# Patient Record
Sex: MALE | Race: WHITE | NOT HISPANIC OR LATINO | Employment: OTHER | URBAN - METROPOLITAN AREA
[De-identification: names, ages, dates, MRNs, and addresses within clinical notes are randomized per-mention and may not be internally consistent; named-entity substitution may affect disease eponyms.]

---

## 2017-04-05 ENCOUNTER — ALLSCRIPTS OFFICE VISIT (OUTPATIENT)
Dept: OTHER | Facility: OTHER | Age: 62
End: 2017-04-05

## 2017-04-05 DIAGNOSIS — R41.3 OTHER AMNESIA: ICD-10-CM

## 2017-04-18 ENCOUNTER — HOSPITAL ENCOUNTER (OUTPATIENT)
Dept: MRI IMAGING | Facility: HOSPITAL | Age: 62
Discharge: HOME | End: 2017-04-18
Payer: COMMERCIAL

## 2017-04-18 ENCOUNTER — TRANSCRIBE ORDERS (OUTPATIENT)
Dept: ADMINISTRATIVE | Facility: HOSPITAL | Age: 62
End: 2017-04-18

## 2017-04-18 DIAGNOSIS — R41.3 OTHER AMNESIA: ICD-10-CM

## 2017-04-26 ENCOUNTER — TRANSCRIBE ORDERS (OUTPATIENT)
Dept: ADMINISTRATIVE | Facility: HOSPITAL | Age: 62
End: 2017-04-26

## 2017-04-26 DIAGNOSIS — R41.3 MEMORY LOSS: Primary | ICD-10-CM

## 2017-05-09 ENCOUNTER — HOSPITAL ENCOUNTER (OUTPATIENT)
Dept: MRI IMAGING | Facility: HOSPITAL | Age: 62
Discharge: HOME | End: 2017-05-09
Payer: COMMERCIAL

## 2017-05-09 DIAGNOSIS — R41.3 MEMORY LOSS: ICD-10-CM

## 2017-05-09 PROCEDURE — 70551 MRI BRAIN STEM W/O DYE: CPT

## 2017-05-10 ENCOUNTER — LAB CONVERSION - ENCOUNTER (OUTPATIENT)
Dept: OTHER | Facility: OTHER | Age: 62
End: 2017-05-10

## 2017-05-10 LAB
ERYTHROCYTE SEDIMENTATION RATE (HISTORICAL): 2 MM/H
METHYLMALONIC ACID (HISTORICAL): 94 NMOL/L (ref 87–318)
T4 FREE SERPL-MCNC: 1 NG/DL (ref 0.8–1.8)
TSH SERPL DL<=0.05 MIU/L-ACNC: 2.01 MIU/L (ref 0.4–4.5)
VIT B12 SERPL-MCNC: 809 PG/ML (ref 200–1100)

## 2017-05-15 ENCOUNTER — ALLSCRIPTS OFFICE VISIT (OUTPATIENT)
Dept: OTHER | Facility: OTHER | Age: 62
End: 2017-05-15

## 2017-06-21 ENCOUNTER — ALLSCRIPTS OFFICE VISIT (OUTPATIENT)
Dept: OTHER | Facility: OTHER | Age: 62
End: 2017-06-21

## 2017-08-01 DIAGNOSIS — E78.5 HYPERLIPIDEMIA: ICD-10-CM

## 2017-08-01 DIAGNOSIS — R53.83 OTHER FATIGUE: ICD-10-CM

## 2017-09-08 LAB
A/G RATIO (HISTORICAL): 1.9 (CALC) (ref 1–2.5)
ALBUMIN SERPL BCP-MCNC: 4.1 G/DL (ref 3.6–5.1)
ALP SERPL-CCNC: 78 U/L (ref 40–115)
ALT SERPL W P-5'-P-CCNC: 21 U/L (ref 9–46)
AST SERPL W P-5'-P-CCNC: 25 U/L (ref 10–35)
BILIRUB SERPL-MCNC: 0.5 MG/DL (ref 0.2–1.2)
BUN SERPL-MCNC: 16 MG/DL (ref 7–25)
BUN/CREA RATIO (HISTORICAL): NORMAL (CALC) (ref 6–22)
CALCIUM SERPL-MCNC: 9.1 MG/DL (ref 8.6–10.3)
CHLORIDE SERPL-SCNC: 104 MMOL/L (ref 98–110)
CHOLEST SERPL-MCNC: 212 MG/DL
CO2 SERPL-SCNC: 29 MMOL/L (ref 20–31)
CREAT SERPL-MCNC: 0.97 MG/DL (ref 0.7–1.25)
EGFR AFRICAN AMERICAN (HISTORICAL): 95 ML/MIN/1.73M2
EGFR-AMERICAN CALC (HISTORICAL): 82 ML/MIN/1.73M2
GAMMA GLOBULIN (HISTORICAL): 2.2 G/DL (CALC) (ref 1.9–3.7)
GLUCOSE (HISTORICAL): 93 MG/DL (ref 65–99)
HDLC SERPL-MCNC: 67 MG/DL
LDL CHOLESTEROL DIRECT (HISTORICAL): 131 MG/DL
POTASSIUM SERPL-SCNC: 4.6 MMOL/L (ref 3.5–5.3)
SODIUM SERPL-SCNC: 140 MMOL/L (ref 135–146)
TOTAL PROTEIN (HISTORICAL): 6.3 G/DL (ref 6.1–8.1)
TRIGL SERPL-MCNC: 61 MG/DL

## 2017-09-12 ENCOUNTER — ALLSCRIPTS OFFICE VISIT (OUTPATIENT)
Dept: OTHER | Facility: OTHER | Age: 62
End: 2017-09-12

## 2017-09-12 DIAGNOSIS — R06.00 DYSPNEA: ICD-10-CM

## 2017-09-21 ENCOUNTER — HOSPITAL ENCOUNTER (OUTPATIENT)
Dept: NON INVASIVE DIAGNOSTICS | Facility: CLINIC | Age: 62
Discharge: HOME | End: 2017-09-21
Payer: COMMERCIAL

## 2017-09-21 DIAGNOSIS — R06.00 DYSPNEA: ICD-10-CM

## 2017-09-21 LAB
CHEST PAIN STATEMENT: NORMAL
MAX DIASTOLIC BP: 84 MMHG
MAX HEART RATE: 162 BPM
MAX PREDICTED HEART RATE: 156 BPM
MAX. SYSTOLIC BP: 148 MMHG
PROTOCOL NAME: NORMAL
REASON FOR TERMINATION: NORMAL
TARGET HR FORMULA: NORMAL
TEST INDICATION: NORMAL
TIME IN EXERCISE PHASE: 780 S

## 2017-09-21 PROCEDURE — 93017 CV STRESS TEST TRACING ONLY: CPT

## 2017-10-26 NOTE — PROGRESS NOTES
Assessment  1  Depression with anxiety (300 4) (F41 8)  2  Fatigue (780 79) (R53 83)  3  Hyperlipidemia (272 4) (E78 5)  4  Memory loss (780 93) (R41 3)  5  Sleep apnea (780 57) (G47 30)     #1 health maintenance-given influenza vaccine today  2- episode of feeling anxious for 30 minutes-no obvious etiology  Exam stable  EKG stable  Because of this and his risk factors for CAD I did set him up for screening stress test  #3 concern about memory  Improved on cutting back alcohol  MRI of the brain normal  All labs including thyroid function, B 12, CBC, SMA methylmalonic acid level normal  Treating his sleep apnea cases is contributing  Questionable component of pseudodementia with depression  #4 anxiety depression-on an SSRI and appears to be responding to some degree-Lexapro current dose was started in May 2017  #5 sleep apnea-mild-improved with less alcohol  Again encouraged him to use his continuous positive airway pressure nightly because of his comorbidities  #6 hyperlipidemia-previous he stopped her statin because of questional leg weakness  Now back on pravastatin but I increased it to daily    All other problems as per note of April 2017    MEDICAL REGIMEN: Baby aspirin daily, increasing pravastatin 20 mg daily, Xanax 0 25 mg twice a day when necessary, Lexapro 10 mg daily, continuous positive airway pressure    Appointment in several months or prior chemistry profile, cholesterol profile  Await results of stress test    Addendum-stress test done in September 2017 normal  ZARA CHART AT NEXT VISIT STRESS TEST DONE IN [facility] 2017        Plan  Influenza vaccine given  Stress test ordered  Increase pravastatin 20 mg daily  Wear continuous positive airway pressure nightly  Call office if noting any chest pain or pressure with activity  Call office if noting any weakness of one arm or leg compared to the other  Call office if noting any numbness of one arm or leg compared to the other    Call office if noting any blurred or double vision     History of Present Illness  HPI: Reviewed several issues  He said he had an issue where he was working outside  For about 30 minutes he felt anxious and sweaty  He did not of associated palpitations or headache  He said when his family saw him he was stable  He said he is a history of anxiety but this was more profound than before  Denied of any definite chest pain or pressure  EKG done today shows a normal tracing specifically sinus rhythm, normal axis, normal intervals, normal tracing  is a history of hyperlipidemia  He remains in a statin  He understands the difference between statin so she myalgias and arthralgias from degenerative arthritis  done recently show normal chemistry profile with a creatinine of 0 97, cholesterol 212, triglycerides 61, HDL 67,   He has not been using his continuous positive airway pressure  He understands importance of this  He has concerns about memory loss and we reviewed literature showing that treating patients with sleep apnea helps improve his memory function  That is not decreases the incidence of dementia  patient denies any systemic symptoms  Specifically there has been no evidence of fever, night sweats, significant weight loss or significant decrease in appetite  is a history of anxiety  As noted he remains in his SSRI  This has not been a major issue  He denies significant anxiety or depressive symptoms other than the above-described episode  is now retired? HE SAYS HE FEELS MUCH LESS ANXIOUS SINCE THIS HAS OCCURRED?        Review of Systems  Complete-Male:   Constitutional: feeling tired, but-- no fever,-- not feeling poorly-- and-- no chills  Eyes: No complaints of eye pain, no red eyes, no discharge from eyes, no itchy eyes  ENT: no earache,-- no nosebleeds,-- no sore throat,-- no hearing loss,-- no nasal discharge-- and-- no hoarseness     Cardiovascular: No complaints of slow heart rate, no fast heart rate, no chest pain, no palpitations, no leg claudication, no lower extremity  Respiratory: cough, but-- no shortness of breath,-- no orthopnea,-- no wheezing,-- no shortness of breath during exertion-- and-- no PND  Gastrointestinal: No complaints of abdominal pain, no constipation, no nausea or vomiting, no diarrhea or bloody stools  Genitourinary: No complaints of dysuria, no incontinence, no hesitancy, no nocturia, no genital lesion, no testicular pain  Musculoskeletal: limb pain, but-- no arthralgias,-- no joint swelling,-- no myalgias-- and-- no limb swelling  Integumentary: No complaints of skin rash or skin lesions, no itching, no skin wound, no dry skin  Neurological: No compliants of headache, no confusion, no convulsions, no numbness or tingling, no dizziness or fainting, no limb weakness, no difficulty walking  Psychiatric: anxiety-- and-- Questional memory issues, but-- not suicidal,-- no personality change,-- no sleep disturbances-- and-- no depression  Endocrine: No complaints of proptosis, no hot flashes, no muscle weakness, no erectile dysfunction, no deepening of the voice, no feelings of weakness  Hematologic/Lymphatic: No complaints of swollen glands, no swollen glands in the neck, does not bleed easily, no easy bruising  Active Problems  1  Allergic Reaction (995 3)  2  Ankle sprain (845 00) (S93 409A)  3  Anxiety (300 00) (F41 9)  4  Backache (724 5) (M54 9)  5  Benign prostatic hypertrophy (600 00) (N40 0)  6  Cough (786 2) (R05)  7  Depression with anxiety (300 4) (F41 8)  8  Dizziness (780 4) (R42)  9  Dyspnea (786 09) (R06 00)  10  Elevated ALT measurement (790 4) (R74 0)  11  Elevated AST (SGOT) (790 4) (R74 0)  12  Encounter for prostate cancer screening (V76 44) (Z12 5)  13  Fatigue (780 79) (R53 83)  14  Hyperlipidemia (272 4) (E78 5)  15  Loss of hearing (389 9) (H91 90)  16  Malignant melanoma of skin (172 9) (C43 9)  17  Memory loss (780 93) (R41 3)  18   Pain in ankle joint (786 30) (M25 579)  19  Pain of upper extremity, unspecified laterality (729 5) (M79 603)  20  Sleep apnea (780 57) (G47 30)  21  Symptoms involving urinary system (788 99) (R39 9)    Past Medical History  1  History of Acute otitis media, unspecified laterality  2  History of Acute upper respiratory infection (465 9) (J06 9)  3  History of BPH with obstruction/lower urinary tract symptoms (600 01,599 69) (N40 1,N13 8)  4  History of Cough (786 2) (R05)  5  History of acute bronchitis (V12 69) (Z87 09)  6  History of acute sinusitis (V12 69) (Z87 09)  7  History of fever (V13 89) (Z87 898)  8  History of pneumonia (V12 61) (Z87 01)  9  History of sinusitis (V12 69) (Z87 09)  10  History of viral infection (V12 09) (Z86 19)  11  History of Laceration (879 8)  12  History of Long term use of drug (V58 69) (Z79 899)  13  History of Need for prophylactic vaccination and inoculation against influenza (V04 81) (Z23)  14  History of Tachycardia (785 0) (R00 0)  15  History of Thrombophlebitis of superficial veins of upper extremities (451 82) (I80 8)  16  History of Uncomplicated alcohol abuse (305 00) (F10 10)  17  History of Weakness of both arms (729 89) (R29 898)  18  History of Weakness of both legs (729 89) (R29 898)  Active Problems And Past Medical History Reviewed: The active problems and past medical history were reviewed and updated today  Surgical History  1  History of Complete Colonoscopy  2  History of Hernia Repair  Surgical History Reviewed: The surgical history was reviewed and updated today  Family History  Mother   1  Family history of Arthritis (V17 7)  2  Family history of Hyperlipidemia  Family History   3  Family history of Arthritis (V17 7)  4  Family history of Hyperlipidemia    Social History   · Being A Social Drinker   · Former smoker (P42 67) (X87 577)   · Marital History - Currently   Social History Reviewed: The social history was reviewed and updated today   The social history was reviewed and is unchanged  Current Meds  1  Aspirin 81 MG TABS; TAKE 1 TABLET DAILY; Therapy: (Recorded:04Oct2016) to Recorded  2  [employer] Daily Multiple Oral Tablet; Therapy: (Recorded:04Mar2014) to Recorded  3  Escitalopram Oxalate 10 MG Oral Tablet; TAKE 1/2 TABLET BY MOUTH DAILY FOR 1 WEEK, THEN   INCREASE TO 1 TAB DAILY; Therapy: 52FHP0444 to (Evaluate:21Jun2018)  Requested for: 04PBY5573; Last Rx:57Cpm3675   Ordered  4  Flax Seed Oil CAPS; Therapy: (Recorded:04Mar2014) to Recorded  5  Pravastatin Sodium 20 MG Oral Tablet; TAKE 1 TABLET AT BEDTIME; Therapy: 93FRG4513 to [phone])  Requested for: 50JQV9934; Last Rx:77Azs2642   Ordered  Medication List Reviewed: The medication list was reviewed and updated today  Allergies  1  Tamiflu CAPS  2  Fish Oil CAPS    Vitals  Vital Signs    Recorded: 93Fhj4769 04:09PM Recorded: 12Sep2017 03:34PM   Heart Rate 68    Respiration 14    Systolic 680, RUE, Sitting    Diastolic 70, RUE, Sitting    Height  5 ft 8 in   Weight  160 lb    BMI Calculated  24 33   BSA Calculated  1 86     Physical Exam    Constitutional   General appearance: No acute distress, well appearing and well nourished  Head and Face   Head and face: Normal     Palpation of the face and sinuses: No sinus tenderness  Eyes   Conjunctiva and lids: No erythema, swelling or discharge  Pupils and irises: Equal, round, reactive to light  Ears, Nose, Mouth, and Throat   External inspection of ears and nose: Normal     Otoscopic examination: Tympanic membranes translucent with normal light reflex  Canals patent without erythema  Hearing: Normal     Nasal mucosa, septum, and turbinates: Normal without edema or erythema  Lips, teeth, and gums: Normal, good dentition  Oropharynx: Normal with no erythema, edema, exudate or lesions  Neck   Neck: Supple, symmetric, trachea midline, no masses  Thyroid: Normal, no thyromegaly      Pulmonary   Respiratory effort: No increased work of breathing or signs of respiratory distress  Percussion of chest: Normal     Palpation of chest: Normal     Auscultation of lungs: Clear to auscultation  Cardiovascular   Palpation of heart: Normal PMI, no thrills  Auscultation of heart: Normal rate and rhythm, normal S1 and S2, no murmurs  Carotid pulses: 2+ bilaterally  Abdominal aorta: Normal     Femoral pulses: 2+ bilaterally  Pedal pulses: 2+ bilaterally  Peripheral vascular exam: Normal     Examination of extremities for edema and/or varicosities: Normal     Chest   Breasts: Normal, no dimpling or skin changes appreciated  Palpation of breasts and axillae: Normal, no masses palpated  Chest: Normal     Abdomen   Abdomen: Non-tender, no masses  Liver and spleen: No hepatomegaly or splenomegaly  Examination for hernias: No hernias appreciated  Anus, perineum, and rectum: Normal sphincter tone, no masses, no prolapse  Genitourinary   Scrotal contents: Normal testes, no masses  Penis: Normal, no lesions  Lymphatic   Palpation of lymph nodes in neck: No lymphadenopathy  Palpation of lymph nodes in axillae: No lymphadenopathy  Palpation of lymph nodes in groin: No lymphadenopathy  Palpation of lymph nodes in other areas: No lymphadenopathy  Musculoskeletal   Gait and station: Normal     Inspection/palpation of digits and nails: Normal without clubbing or cyanosis  Inspection/palpation of joints, bones, and muscles: Normal     Range of motion: Normal     Stability: Normal     Muscle strength/tone: Normal     Skin   Skin and subcutaneous tissue: Normal without rashes or lesions  Palpation of skin and subcutaneous tissue: Normal turgor  Neurologic   Cranial nerves: Cranial nerves 2-12 intact  Reflexes: 2+ and symmetric  Sensation: No sensory loss  Psychiatric   Judgment and insight: Normal     Orientation to person, place and time: Normal     Recent and remote memory: Intact  Mood and affect: Normal        Signatures   Electronically signed by : OCTAVIA Johnson ; Sep 12 2017  4:15PM EST                       (Author)    Electronically signed by : OCTAVIA Johnson ; Sep 23 2017  9:30AM EST                       (Author)

## 2017-11-09 ENCOUNTER — APPOINTMENT (OUTPATIENT)
Dept: PHYSICAL THERAPY | Age: 62
End: 2017-11-09
Payer: COMMERCIAL

## 2017-11-09 PROCEDURE — G8990 OTHER PT/OT CURRENT STATUS: HCPCS

## 2017-11-09 PROCEDURE — G8991 OTHER PT/OT GOAL STATUS: HCPCS

## 2017-11-09 PROCEDURE — 97162 PT EVAL MOD COMPLEX 30 MIN: CPT

## 2017-11-14 ENCOUNTER — APPOINTMENT (OUTPATIENT)
Dept: PHYSICAL THERAPY | Age: 62
End: 2017-11-14
Payer: COMMERCIAL

## 2017-11-14 PROCEDURE — 97110 THERAPEUTIC EXERCISES: CPT

## 2017-11-14 PROCEDURE — 97140 MANUAL THERAPY 1/> REGIONS: CPT

## 2017-11-16 ENCOUNTER — APPOINTMENT (OUTPATIENT)
Dept: PHYSICAL THERAPY | Age: 62
End: 2017-11-16
Payer: COMMERCIAL

## 2017-11-16 PROCEDURE — 97140 MANUAL THERAPY 1/> REGIONS: CPT

## 2017-11-16 PROCEDURE — 97112 NEUROMUSCULAR REEDUCATION: CPT

## 2017-11-20 ENCOUNTER — APPOINTMENT (OUTPATIENT)
Dept: PHYSICAL THERAPY | Age: 62
End: 2017-11-20
Payer: COMMERCIAL

## 2017-11-20 PROCEDURE — 97112 NEUROMUSCULAR REEDUCATION: CPT

## 2017-11-20 PROCEDURE — 97140 MANUAL THERAPY 1/> REGIONS: CPT

## 2017-11-21 ENCOUNTER — ALLSCRIPTS OFFICE VISIT (OUTPATIENT)
Dept: OTHER | Facility: OTHER | Age: 62
End: 2017-11-21

## 2017-11-22 ENCOUNTER — APPOINTMENT (OUTPATIENT)
Dept: PHYSICAL THERAPY | Age: 62
End: 2017-11-22
Payer: COMMERCIAL

## 2017-11-22 PROCEDURE — 97140 MANUAL THERAPY 1/> REGIONS: CPT

## 2017-11-22 PROCEDURE — 97112 NEUROMUSCULAR REEDUCATION: CPT

## 2017-11-22 NOTE — PROGRESS NOTES
Assessment  Assessed    1  Hyperlipidemia (272 4) (E78 5)    Plan  Hyperlipidemia    · Pravastatin Sodium 20 MG Oral Tablet; TAKE 1 TABLET AT BEDTIME   Rx By: Star Fillers; Dispense: 90 Days ; #:90 Tablet; Refill: 3;Hyperlipidemia; [place] = N; Verified Transmission to [id number] [place] [address]  S [address] ; Last Updated By: System, trend.ly; 11/21/2017 2:43:11 PM   · Follow-up visit in 6 months Evaluation and Treatment  Follow-up  Status: Hold For -Scheduling  Requested for: 63RDX0856   Ordered; Hyperlipidemia; Ordered By: Star Fillers Performed:  Due: 30ITX7930    Discussion/Summary  Cardiology Discussion Summary Free Text Note Form [facility]:   57year old man with history of dyslipidemia who presents for follow up  Major complaint is tiredness  No change with stopping pravastatin  No chest pain, shortness of breath, or palpitations  Has sleep apnea - is trying to use mask  Somnolence - likely due to Sleep Apnea  Is trying CPAP  Dyslipidemia - on Pravastatin  Borderline control  Continue current medications  Follow up in 6 months  Chief Complaint  Chief Complaint Free Text Note Form: Patient is here today for 6 mo f/u  Patient c/o occ lightheadedness  Chief Complaint Chronic Condition [facility]: Patient is here today for follow up of chronic conditions described in HPI  History of Present Illness  Cardiology HPI Free Text Note Form [facility]: Mr Rachna Geiger is a 57year old man with history of dyslipidemia who presents for follow up  Major complaint is tiredness  No change with stopping pravastatin  No chest pain, shortness of breath, or palpitations  Has sleep apnea - is trying to use mask  Review of Systems  Cardiology Male ROS:    Cardiac: No complaints of chest pain, no palpitations, no fainiting  Skin: No complaints of nonhealing sores or skin rash    Genitourinary: No complaints of recurrent urinary tract infections, frequent urination at night, difficult urination, blood in urine, kidney stones, loss of bladder control, no kidney or prostate problems, no erectile dysfunction  Psychological: No complaints of feeling depressed, anxiety, panic attacks, or difficulty concentrating  General: lack of energy/fatigue--   Weakness  Respiratory: No complaints of shortness of breath, cough with sputum, or wheezing  HEENT: No complaints of serious problems, hearing problems, nose problems, throat problems, or snoring  Gastrointestinal: No complaints of liver problems, nausea, vomiting, heartburn, constipation, bloody stools, diarrhea, problems swallowing, adbominal pain, or rectal bleeding  Hematologic: No complaints of bleeding disorders, anemia, blood clots, or excessive brusing  Neurological: No complaints of numbness, tingling, dizziness, weakness, seizures, headaches, syncope or fainting, AM fatigue, daytime sleepiness, no witnessed apnea episodes  Musculoskeletal: No complaints of arthritis, back pain, or painfull swelling  Active Problems  Problems    1  Allergic Reaction (995 3)   2  Ankle sprain (845 00) (S93 409A)   3  Anxiety (300 00) (F41 9)   4  Backache (724 5) (M54 9)   5  Benign prostatic hypertrophy (600 00) (N40 0)   6  Cough (786 2) (R05)   7  Depression with anxiety (300 4) (F41 8)   8  Dizziness (780 4) (R42)   9  Dyspnea (786 09) (R06 00)   10  Elevated ALT measurement (790 4) (R74 0)   11  Elevated AST (SGOT) (790 4) (R74 0)   12  Encounter for prostate cancer screening (V76 44) (Z12 5)   13  Fatigue (780 79) (R53 83)   14  Hyperlipidemia (272 4) (E78 5)   15  Loss of hearing (389 9) (H91 90)   16  Malignant melanoma of skin (172 9) (C43 9)   17  Memory loss (780 93) (R41 3)   18  Need for influenza vaccination (V04 81) (Z23)   19  Pain in ankle joint (719 47) (M25 579)   20  Pain of upper extremity, unspecified laterality (729 5) (M79 603)   21  Palpitation (785 1) (R00 2)   22  Sleep apnea (780 57) (G47 30)   23  Symptoms involving urinary system (788 99) (R39 9)   24   Vitamin D deficiency (268 9) (E55 9)    Past Medical History  Problems    1  History of Acute otitis media, unspecified laterality   2  History of Acute upper respiratory infection (465 9) (J06 9)   3  History of BPH with obstruction/lower urinary tract symptoms (600 01,599 69) (N40 1,N13 8)   4  History of Cough (786 2) (R05)   5  History of acute bronchitis (V12 69) (Z87 09)   6  History of acute sinusitis (V12 69) (Z87 09)   7  History of fever (V13 89) (Z87 898)   8  History of pneumonia (V12 61) (Z87 01)   9  History of sinusitis (V12 69) (Z87 09)   10  History of viral infection (V12 09) (Z86 19)   11  History of Laceration (879 8)   12  History of Long term use of drug (V58 69) (Z79 899)   13  History of Need for prophylactic vaccination and inoculation against influenza (V04 81)  (Z23)   14  History of Tachycardia (785 0) (R00 0)   15  History of Thrombophlebitis of superficial veins of upper extremities (451 82) (I80 8)   16  History of Uncomplicated alcohol abuse (305 00) (F10 10)   17  History of Weakness of both arms (729 89) (R29 898)   18  History of Weakness of both legs (729 89) (R29 898)  Active Problems And Past Medical History Reviewed: The active problems and past medical history were reviewed and updated today  Surgical History  Problems    1  History of Complete Colonoscopy   2  History of Hernia Repair  Surgical History Reviewed: The surgical history was reviewed and updated today  Family History  Mother    1  Family history of Arthritis (V17 7)   2  Family history of Hyperlipidemia  Family History    3  Family history of Arthritis (V17 7)   4  Family history of Hyperlipidemia  Family History Reviewed: The family history was reviewed and updated today  Social History  Problems    · Being A Social Drinker   · Former smoker (K54 30) (W00 362)   · Marital History - Currently   Social History Reviewed: The social history was reviewed and updated today   The social history was reviewed and is unchanged  Current Meds   1  Aspirin 81 MG TABS; TAKE 1 TABLET DAILY; Therapy: (Recorded:04Oct2016) to Recorded   2  [employer] Daily Multiple Oral Tablet; Therapy: (Recorded:04Mar2014) to Recorded   3  Escitalopram Oxalate 10 MG Oral Tablet; TAKE 1/2 TABLET BY MOUTH DAILY FOR 1 WEEK, THEN INCREASE TO 1 TAB DAILY; Therapy: 35JWO4102 to (Evaluate:21Jun2018)  Requested for: 06NDU0679; Last Rx:34Ayf1531 Ordered   4  Flax Seed Oil CAPS; Therapy: (Recorded:04Mar2014) to Recorded   5  Fluticasone Propionate 50 MCG/ACT Nasal Suspension; use 2 sprays in each nostril once daily; Therapy: 57UDL5101 to (Evaluate:53Rtp1375)  Requested for: 09NNS2079; Last Rx:53Guy4267 Ordered   6  Pravastatin Sodium 20 MG Oral Tablet; TAKE 1 TABLET AT BEDTIME; Therapy: 66YYI6399 to 21 )  Requested for: 06KBO0766; Last Rx:19Oct2016 Ordered  Medication List Reviewed: The medication list was reviewed and updated today  Allergies  Medication    1  Tamiflu CAPS   2  Fish Oil CAPS    Vitals  Vital Signs    Recorded: 21Nov2017 02:40PM   Heart Rate 60   Systolic 98, LUE, Sitting   Diastolic 60, LUE, Sitting   Height 5 ft 8 in   Weight 161 lb 6 oz   BMI Calculated 24 54   BSA Calculated 1 87   O2 Saturation 97       Physical Exam   Constitutional  General appearance: No acute distress, well appearing and well nourished  Eyes  Conjunctiva and Sclera examination: Conjunctiva pink, sclera anicteric  Ears, Nose, Mouth, and Throat - External inspection of ears and nose: Normal without deformities or discharge  Neck  Neck and thyroid: Normal, supple, trachea midline, no thyromegaly  Pulmonary  Respiratory effort: No increased work of breathing or signs of respiratory distress  Cardiovascular  Auscultation of heart: Normal rate and rhythm, normal S1 and S2, no murmurs  Carotid pulses: Normal, 2+ bilaterally     Examination of extremities for edema and/or varicosities: Normal    Chest - Chest: Normal   Abdomen  Abdomen: Non-tender and no distention  Musculoskeletal Gait and station: Normal gait  Skin - Skin and subcutaneous tissue: Normal without rashes or lesions  Skin is warm and well perfused, normal turgor  Neurologic - Speech: Normal    Psychiatric - Orientation to person, place, and time: Normal -- Mood and affect: Normal       Future Appointments    Date/Time Provider Specialty Site   02/13/2018 02:30 PM OCTAVIA Donnelly   Internal Medicine Amairani Flores MD       Signatures   Electronically signed by : OCTAVIA Sanabria ; Nov 21 2017  2:48PM EST                       (Author)

## 2017-11-30 ENCOUNTER — APPOINTMENT (OUTPATIENT)
Dept: PHYSICAL THERAPY | Age: 62
End: 2017-11-30
Payer: COMMERCIAL

## 2017-11-30 PROCEDURE — G8990 OTHER PT/OT CURRENT STATUS: HCPCS

## 2017-11-30 PROCEDURE — G8991 OTHER PT/OT GOAL STATUS: HCPCS

## 2017-11-30 PROCEDURE — 97110 THERAPEUTIC EXERCISES: CPT

## 2017-11-30 PROCEDURE — 97112 NEUROMUSCULAR REEDUCATION: CPT

## 2018-01-13 VITALS
SYSTOLIC BLOOD PRESSURE: 120 MMHG | DIASTOLIC BLOOD PRESSURE: 60 MMHG | BODY MASS INDEX: 24.55 KG/M2 | HEART RATE: 62 BPM | WEIGHT: 162 LBS | HEIGHT: 68 IN

## 2018-01-13 VITALS — SYSTOLIC BLOOD PRESSURE: 122 MMHG | HEART RATE: 68 BPM | DIASTOLIC BLOOD PRESSURE: 70 MMHG | RESPIRATION RATE: 14 BRPM

## 2018-01-14 VITALS
HEIGHT: 68 IN | WEIGHT: 166.5 LBS | HEART RATE: 68 BPM | SYSTOLIC BLOOD PRESSURE: 108 MMHG | BODY MASS INDEX: 25.23 KG/M2 | DIASTOLIC BLOOD PRESSURE: 72 MMHG | RESPIRATION RATE: 14 BRPM

## 2018-01-14 VITALS
HEART RATE: 68 BPM | SYSTOLIC BLOOD PRESSURE: 128 MMHG | RESPIRATION RATE: 14 BRPM | DIASTOLIC BLOOD PRESSURE: 70 MMHG | WEIGHT: 160 LBS | HEIGHT: 68 IN | BODY MASS INDEX: 24.25 KG/M2

## 2018-01-14 VITALS
DIASTOLIC BLOOD PRESSURE: 60 MMHG | HEIGHT: 68 IN | SYSTOLIC BLOOD PRESSURE: 98 MMHG | HEART RATE: 60 BPM | BODY MASS INDEX: 24.46 KG/M2 | OXYGEN SATURATION: 97 % | WEIGHT: 161.38 LBS

## 2018-01-16 NOTE — CONSULTS
I had the pleasure of evaluating your patient, Kristine Rivas  My full evaluation follows:      Chief Complaint  Patient is here today for 6 mo f/u  Patient c/o occ lightheadedness  Patient is here today for follow up of chronic conditions described in HPI  History of Present Illness  Mr Elba Hooker is a 57year old man with history of dyslipidemia who presents for follow up  Major complaint is tiredness  No change with stopping pravastatin  No chest pain, shortness of breath, or palpitations  Has sleep apnea - is trying to use mask  Review of Systems      Cardiac: No complaints of chest pain, no palpitations, no fainiting  Skin: No complaints of nonhealing sores or skin rash  Genitourinary: No complaints of recurrent urinary tract infections, frequent urination at night, difficult urination, blood in urine, kidney stones, loss of bladder control, no kidney or prostate problems, no erectile dysfunction  Psychological: No complaints of feeling depressed, anxiety, panic attacks, or difficulty concentrating  General: lack of energy/fatigue   Weakness  Respiratory: No complaints of shortness of breath, cough with sputum, or wheezing  HEENT: No complaints of serious problems, hearing problems, nose problems, throat problems, or snoring  Gastrointestinal: No complaints of liver problems, nausea, vomiting, heartburn, constipation, bloody stools, diarrhea, problems swallowing, adbominal pain, or rectal bleeding  Hematologic: No complaints of bleeding disorders, anemia, blood clots, or excessive brusing  Neurological: No complaints of numbness, tingling, dizziness, weakness, seizures, headaches, syncope or fainting, AM fatigue, daytime sleepiness, no witnessed apnea episodes  Musculoskeletal: No complaints of arthritis, back pain, or painfull swelling  Active Problems    1  Allergic Reaction (995 3)   2  Ankle sprain (845 00) (S93 409A)   3  Anxiety (300 00) (F41 9)   4   Backache (724 5) (M54 9)   5  Benign prostatic hypertrophy (600 00) (N40 0)   6  Cough (786 2) (R05)   7  Depression with anxiety (300 4) (F41 8)   8  Dizziness (780 4) (R42)   9  Dyspnea (786 09) (R06 00)   10  Elevated ALT measurement (790 4) (R74 0)   11  Elevated AST (SGOT) (790 4) (R74 0)   12  Encounter for prostate cancer screening (V76 44) (Z12 5)   13  Fatigue (780 79) (R53 83)   14  Hyperlipidemia (272 4) (E78 5)   15  Loss of hearing (389 9) (H91 90)   16  Malignant melanoma of skin (172 9) (C43 9)   17  Memory loss (780 93) (R41 3)   18  Need for influenza vaccination (V04 81) (Z23)   19  Pain in ankle joint (719 47) (M25 579)   20  Pain of upper extremity, unspecified laterality (729 5) (M79 603)   21  Palpitation (785 1) (R00 2)   22  Sleep apnea (780 57) (G47 30)   23  Symptoms involving urinary system (788 99) (R39 9)   24   Vitamin D deficiency (268 9) (E55 9)    Past Medical History    · History of Acute otitis media, unspecified laterality   · History of Acute upper respiratory infection (465 9) (J06 9)   · History of BPH with obstruction/lower urinary tract symptoms (600 01,599 69)  (N40 1,N13 8)   · History of Cough (786 2) (R05)   · History of acute bronchitis (V12 69) (Z87 09)   · History of acute sinusitis (V12 69) (Z87 09)   · History of fever (V13 89) (Z87 898)   · History of pneumonia (V12 61) (Z87 01)   · History of sinusitis (V12 69) (Z87 09)   · History of viral infection (V12 09) (Z86 19)   · History of Laceration (879 8)   · History of Long term use of drug (V58 69) (Z79 899)   · History of Need for prophylactic vaccination and inoculation against influenza (V04 81)  (Z23)   · History of Tachycardia (785 0) (R00 0)   · History of Thrombophlebitis of superficial veins of upper extremities (451 82) (I80 8)   · History of Uncomplicated alcohol abuse (305 00) (F10 10)   · History of Weakness of both arms (729 89) (R29 898)   · History of Weakness of both legs (729 89) (R29 898)    The active problems and past medical history were reviewed and updated today  Surgical History    · History of Complete Colonoscopy   · History of Hernia Repair    The surgical history was reviewed and updated today  Family History    · Family history of Arthritis (V17 7)   · Family history of Hyperlipidemia    · Family history of Arthritis (V17 7)   · Family history of Hyperlipidemia    The family history was reviewed and updated today  Social History    · Being A Social Drinker   · Former smoker (R06 64) (A64 021)   · Marital History - Currently   The social history was reviewed and updated today  The social history was reviewed and is unchanged  Current Meds   1  Aspirin 81 MG TABS; TAKE 1 TABLET DAILY; Therapy: (Recorded:04Oct2016) to Recorded   2  [employer] Daily Multiple Oral Tablet; Therapy: (Recorded:04Mar2014) to Recorded   3  Escitalopram Oxalate 10 MG Oral Tablet; TAKE 1/2 TABLET BY MOUTH DAILY FOR 1   WEEK, THEN INCREASE TO 1 TAB DAILY; Therapy: 39ROF1362 to (Evaluate:21Jun2018)  Requested for: 01VEC3271; Last   Rx:[id number] Ordered   4  Flax Seed Oil CAPS; Therapy: (Recorded:04Mar2014) to Recorded   5  Fluticasone Propionate 50 MCG/ACT Nasal Suspension; use 2 sprays in each nostril   once daily; Therapy: 98QFF3495 to (Evaluate:15Nhl2072)  Requested for: 03DQH5755; Last   Rx:13Oct2017 Ordered   6  Pravastatin Sodium 20 MG Oral Tablet; TAKE 1 TABLET AT BEDTIME; Therapy: 55PVB7287 to ([phone] [phone])  Requested for: 26Oct2016; Last   Rx:[id number] Ordered    The medication list was reviewed and updated today  Allergies    1  Tamiflu CAPS   2   Fish Oil CAPS    Vitals   Recorded: [id number] 02:40PM   Heart Rate 60   Systolic 98, LUE, Sitting   Diastolic 60, LUE, Sitting   Height 5 ft 8 in   Weight 161 lb 6 oz   BMI Calculated 24 54   BSA Calculated 1 87   O2 Saturation 97     Physical Exam    Constitutional   General appearance: No acute distress, well appearing and well nourished  Eyes   Conjunctiva and Sclera examination: Conjunctiva pink, sclera anicteric  Ears, Nose, Mouth, and Throat - External inspection of ears and nose: Normal without deformities or discharge  Neck   Neck and thyroid: Normal, supple, trachea midline, no thyromegaly  Pulmonary   Respiratory effort: No increased work of breathing or signs of respiratory distress  Cardiovascular   Auscultation of heart: Normal rate and rhythm, normal S1 and S2, no murmurs  Carotid pulses: Normal, 2+ bilaterally  Examination of extremities for edema and/or varicosities: Normal     Chest - Chest: Normal    Abdomen   Abdomen: Non-tender and no distention  Musculoskeletal Gait and station: Normal gait  Skin - Skin and subcutaneous tissue: Normal without rashes or lesions  Skin is warm and well perfused, normal turgor  Neurologic - Speech: Normal     Psychiatric - Orientation to person, place, and time: Normal  Mood and affect: Normal       Assessment    1  Hyperlipidemia (272 4) (E78 5)    Plan  Hyperlipidemia    · Pravastatin Sodium 20 MG Oral Tablet; TAKE 1 TABLET AT BEDTIME   Rx By: Jerod Cevallos; Dispense: 90 Days ; #:90 Tablet; Refill: 3; For: Hyperlipidemia; [place] = N; Verified Transmission to [id number] [facility]  [address] ; Last Updated By: [employer]; 11/21/2017 2:43:11 PM   · Follow-up visit in 6 months Evaluation and Treatment  Follow-up  Status: Hold For -  Scheduling  Requested for: 21Nov2017   Ordered; For: Hyperlipidemia; Ordered By: Jerod Cevallos Performed:  Due: 84QTF1001    Discussion/Summary    57year old man with history of dyslipidemia who presents for follow up  Major complaint is tiredness  No change with stopping pravastatin  No chest pain, shortness of breath, or palpitations  Has sleep apnea - is trying to use mask  Impression:  1  Somnolence - likely due to Sleep Apnea  Is trying CPAP  2  Dyslipidemia - on Pravastatin  Borderline control  Recommendations:  1   Continue current medications  2  Follow up in 6 months  Thank you very much for allowing me to participate in the care of this patient  If you have any questions, please do not hesitate to contact me        Future Appointments    Signatures   Electronically signed by : OCTAVIA Medina ; Nov 21 2017  2:48PM EST                       (Author)

## 2018-02-10 ENCOUNTER — TRANSCRIBE ORDERS (OUTPATIENT)
Dept: ADMINISTRATIVE | Facility: HOSPITAL | Age: 63
End: 2018-02-10

## 2018-02-10 ENCOUNTER — APPOINTMENT (OUTPATIENT)
Dept: LAB | Facility: MEDICAL CENTER | Age: 63
End: 2018-02-10
Payer: MEDICARE

## 2018-02-10 DIAGNOSIS — R53.83 OTHER FATIGUE: ICD-10-CM

## 2018-02-10 DIAGNOSIS — E55.9 AVITAMINOSIS D: ICD-10-CM

## 2018-02-10 DIAGNOSIS — E78.5 HYPERLIPIDEMIA: ICD-10-CM

## 2018-02-10 DIAGNOSIS — E55.9 AVITAMINOSIS D: Primary | ICD-10-CM

## 2018-02-10 DIAGNOSIS — R41.3 OTHER AMNESIA: ICD-10-CM

## 2018-02-10 LAB
25(OH)D3 SERPL-MCNC: 35 NG/ML (ref 30–100)
ALBUMIN SERPL BCP-MCNC: 4.2 G/DL (ref 3.5–5)
ALP SERPL-CCNC: 85 U/L (ref 46–116)
ALT SERPL W P-5'-P-CCNC: 33 U/L (ref 12–78)
ANION GAP SERPL CALCULATED.3IONS-SCNC: 6 MMOL/L (ref 4–13)
AST SERPL W P-5'-P-CCNC: 24 U/L (ref 5–45)
BILIRUB SERPL-MCNC: 0.41 MG/DL (ref 0.2–1)
BUN SERPL-MCNC: 23 MG/DL (ref 5–25)
CALCIUM SERPL-MCNC: 9 MG/DL (ref 8.3–10.1)
CHLORIDE SERPL-SCNC: 105 MMOL/L (ref 100–108)
CHOLEST SERPL-MCNC: 231 MG/DL (ref 50–200)
CO2 SERPL-SCNC: 30 MMOL/L (ref 21–32)
CREAT SERPL-MCNC: 0.93 MG/DL (ref 0.6–1.3)
ERYTHROCYTE [SEDIMENTATION RATE] IN BLOOD: 3 MM/HOUR (ref 0–10)
GFR SERPL CREATININE-BSD FRML MDRD: 86 ML/MIN/1.73SQ M
GLUCOSE P FAST SERPL-MCNC: 88 MG/DL (ref 65–99)
HDLC SERPL-MCNC: 72 MG/DL (ref 40–60)
LDLC SERPL DIRECT ASSAY-MCNC: 150 MG/DL (ref 0–100)
POTASSIUM SERPL-SCNC: 4.5 MMOL/L (ref 3.5–5.3)
PROT SERPL-MCNC: 7.5 G/DL (ref 6.4–8.2)
SODIUM SERPL-SCNC: 141 MMOL/L (ref 136–145)
T4 FREE SERPL-MCNC: 0.76 NG/DL (ref 0.76–1.46)
TRIGL SERPL-MCNC: 75 MG/DL
TSH SERPL DL<=0.05 MIU/L-ACNC: 2.33 UIU/ML (ref 0.36–3.74)
VIT B12 SERPL-MCNC: 889 PG/ML (ref 100–900)

## 2018-02-10 PROCEDURE — 83721 ASSAY OF BLOOD LIPOPROTEIN: CPT

## 2018-02-10 PROCEDURE — 36415 COLL VENOUS BLD VENIPUNCTURE: CPT

## 2018-02-10 PROCEDURE — 82306 VITAMIN D 25 HYDROXY: CPT

## 2018-02-10 PROCEDURE — 83918 ORGANIC ACIDS TOTAL QUANT: CPT

## 2018-02-10 PROCEDURE — 80061 LIPID PANEL: CPT

## 2018-02-10 PROCEDURE — 82607 VITAMIN B-12: CPT

## 2018-02-10 PROCEDURE — 85652 RBC SED RATE AUTOMATED: CPT

## 2018-02-10 PROCEDURE — 80053 COMPREHEN METABOLIC PANEL: CPT

## 2018-02-10 PROCEDURE — 84443 ASSAY THYROID STIM HORMONE: CPT

## 2018-02-10 PROCEDURE — 84439 ASSAY OF FREE THYROXINE: CPT

## 2018-02-11 PROBLEM — R00.2 PALPITATION: Status: ACTIVE | Noted: 2017-09-12

## 2018-02-11 PROBLEM — R00.2 PALPITATION: Chronic | Status: ACTIVE | Noted: 2017-09-12

## 2018-02-11 PROBLEM — E55.9 VITAMIN D DEFICIENCY: Status: ACTIVE | Noted: 2017-09-12

## 2018-02-11 PROBLEM — E55.9 VITAMIN D DEFICIENCY: Chronic | Status: ACTIVE | Noted: 2017-09-12

## 2018-02-11 PROBLEM — R41.3 MEMORY LOSS: Status: ACTIVE | Noted: 2017-04-05

## 2018-02-13 ENCOUNTER — OFFICE VISIT (OUTPATIENT)
Dept: INTERNAL MEDICINE CLINIC | Facility: CLINIC | Age: 63
End: 2018-02-13
Payer: MEDICARE

## 2018-02-13 VITALS
HEIGHT: 68 IN | SYSTOLIC BLOOD PRESSURE: 130 MMHG | HEART RATE: 72 BPM | WEIGHT: 169 LBS | DIASTOLIC BLOOD PRESSURE: 80 MMHG | BODY MASS INDEX: 25.61 KG/M2 | RESPIRATION RATE: 14 BRPM

## 2018-02-13 DIAGNOSIS — E78.5 HYPERLIPIDEMIA, UNSPECIFIED HYPERLIPIDEMIA TYPE: Primary | ICD-10-CM

## 2018-02-13 DIAGNOSIS — E55.9 VITAMIN D DEFICIENCY: Chronic | ICD-10-CM

## 2018-02-13 DIAGNOSIS — F41.8 DEPRESSION WITH ANXIETY: Chronic | ICD-10-CM

## 2018-02-13 DIAGNOSIS — G47.30 SLEEP APNEA, UNSPECIFIED TYPE: ICD-10-CM

## 2018-02-13 DIAGNOSIS — E78.01 FAMILIAL HYPERCHOLESTEROLEMIA: Chronic | ICD-10-CM

## 2018-02-13 PROCEDURE — 99214 OFFICE O/P EST MOD 30 MIN: CPT | Performed by: INTERNAL MEDICINE

## 2018-02-13 RX ORDER — PRAVASTATIN SODIUM 20 MG
20 TABLET ORAL DAILY
COMMUNITY
Start: 2016-10-04 | End: 2018-12-10

## 2018-02-13 RX ORDER — ESCITALOPRAM OXALATE 20 MG/1
20 TABLET ORAL
COMMUNITY
Start: 2017-04-26 | End: 2018-10-16 | Stop reason: SDUPTHER

## 2018-02-13 NOTE — PROGRESS NOTES
Assessment/Plan:  1  Episodes of being anxious for 30 minutes  No obvious etiology  Exam stable  Stress test negative  All labs stable  2  Concerned about memory-improved with cutting back on alcohol  MRI of the brain normal   All labs including thyroid function B12 CBC SMA normal   Treating his sleep apnea in case it is contributing  Questionable component of soda dementia with depression  3  Anxiety depression-improved to some degree-continue current dose of generic Lexapro which was started in May 2017  4  Sleep apnea-mild-improved with less alcohol  Again encouraged him to use CPAP nightly because of his comorbidities  5   Hyperlipidemia-previously he stop the statin because of leg weakness  He is now back on pravastatin and I again recommended 20 milligrams daily  We may need to add Zetia and he is aware this  6  Anxiety-infrequent use of Xanax  As noted also now on his SSRI  7  Lower urinary tract symptoms-mild-watching carefully    All other problems as per note of April 2017      MEDICAL REGIMEN:      Baby aspirin daily  Pravastatin 20 milligrams daily-she will use up 10 milligram then switch to 20 milligram dosing, rare use of Xanax 0 25 milligrams b i d  p r n , generic Lexapro 10 milligrams daily, continuous positive airway pressure    Appointment in several months with prior chemistry profile cholesterol profile  No problem-specific Assessment & Plan notes found for this encounter  Diagnoses and all orders for this visit:    Hyperlipidemia, unspecified hyperlipidemia type  -     aspirin 81 MG tablet; Take 1 tablet by mouth daily  -     escitalopram (LEXAPRO) 10 mg tablet; Take by mouth  -     pravastatin (PRAVACHOL) 20 mg tablet; Take 20 mg by mouth daily    -     CBC and differential; Future  -     Comprehensive metabolic panel; Future  -     Cholesterol, total; Future  -     Triglycerides; Future  -     HDL cholesterol; Future  -     LDL cholesterol, direct;  Future    Sleep apnea, unspecified type  -     aspirin 81 MG tablet; Take 1 tablet by mouth daily  -     escitalopram (LEXAPRO) 10 mg tablet; Take by mouth  -     pravastatin (PRAVACHOL) 20 mg tablet; Take 20 mg by mouth daily    -     CBC and differential; Future  -     Comprehensive metabolic panel; Future  -     Cholesterol, total; Future  -     Triglycerides; Future  -     HDL cholesterol; Future  -     LDL cholesterol, direct; Future    Depression with anxiety    Familial hypercholesterolemia    Vitamin D deficiency          Subjective:      Patient ID: Ramon Bendeict is a 57 y o  male  He is now retired since July  He has been very sedentary -a he spent a large amount of time on the [employer] watching TV with the dogs  We talked about the importance of exercise  He has concerns about cognitive status and I explained how daily exercise can improve memory  He understands importance of this  He is getting adequate sleep    He still significant hyperlipidemia  He is uses with statins  Labs done prior to this visit show B12 level normal TSH normal sed rate normal vitamin-D level normal at 35, LDL of 150 HDL of 72 triglycerides 65 cholesterol 231 chemistry profile normal with a BUN of 23 and creatinine of 0 93  Was supposed to be on pravastatin 20 milligrams daily but is only taking 10 milligrams  He will increase to 20 milligrams-I total if unimproved by next visit we may need to add Zetia  He understands this whole concept  He has not had any the previously described episodes Re felt funny for seconds  He denies weakness of 1 arm and leg compared to the other  He denies numbness of 1 arm and leg compared to the other  He denies blurred or double vision or difficulty with speech  Stress test done in September was normal     He has known sleep apnea  It is mild but we want him to uses device  He has not been doing that any understands the importance of doing this  He has anxiety depression    His wife feels he needs to be a more social   He apparently he has become progressively less social since he retired  A we talked about this in detail  He understands the importance of this  This patient denies any systemic symptoms  Specifically there has been no evidence of fever, night sweats, significant weight loss or significant decrease in appetite  The    This was a 30 minutes visit with more than 50 percent of the time spent counseling the patient formulating a treatment plan  Multiple questions answered        The following portions of the patient's history were reviewed and updated as appropriate: current medications, past family history, past medical history, past social history, past surgical history and problem list     Review of Systems   Constitutional: Positive for fatigue  Respiratory: Negative  Cardiovascular: Negative  Gastrointestinal: Negative  Endocrine: Negative  Genitourinary: Negative  Nocturia x1   Musculoskeletal: Negative  Neurological: Negative  Hematological: Negative  Psychiatric/Behavioral: Negative  Objective:    Vitals:    02/13/18 1429   BP: 130/80   Pulse: 72   Resp: 14        Physical Exam   Constitutional: He is oriented to person, place, and time  He appears well-developed and well-nourished  No distress  HENT:   Head: Normocephalic and atraumatic  Right Ear: External ear normal    Left Ear: External ear normal    Nose: Nose normal    Mouth/Throat: Oropharynx is clear and moist  No oropharyngeal exudate  Eyes: Conjunctivae and EOM are normal  Pupils are equal, round, and reactive to light  Right eye exhibits no discharge  Left eye exhibits no discharge  No scleral icterus  Neck: No JVD present  No tracheal deviation present  No thyromegaly present  Cardiovascular: Normal rate, regular rhythm, normal heart sounds and intact distal pulses  Exam reveals no gallop and no friction rub  No murmur heard    Pulmonary/Chest: Effort normal and breath sounds normal  No stridor  No respiratory distress  He has no wheezes  He has no rales  He exhibits no tenderness  Abdominal: Bowel sounds are normal  He exhibits no distension and no mass  There is no tenderness  There is no rebound and no guarding  Musculoskeletal: Normal range of motion  He exhibits no edema, tenderness or deformity  Lymphadenopathy:     He has no cervical adenopathy  Neurological: He is alert and oriented to person, place, and time  He has normal reflexes  No cranial nerve deficit  He exhibits normal muscle tone  Coordination normal    Skin: Skin is warm and dry  No rash noted  He is not diaphoretic  No erythema  No pallor  Benign keratoses   Psychiatric: He has a normal mood and affect  His behavior is normal  Judgment and thought content normal    Vitals reviewed

## 2018-02-15 LAB — METHYLMALONATE SERPL-SCNC: 116 NMOL/L (ref 0–378)

## 2018-03-16 ENCOUNTER — TELEPHONE (OUTPATIENT)
Dept: INTERNAL MEDICINE CLINIC | Facility: CLINIC | Age: 63
End: 2018-03-16

## 2018-03-16 DIAGNOSIS — F03.90 MILD DEMENTIA (HCC): Primary | ICD-10-CM

## 2018-03-16 NOTE — TELEPHONE ENCOUNTER
Patient wife showed me message from her daughter stating she thinks he needs PT & OT for memory loss & cognitive issues  Antwan Sheehan can be called to arrange     [phone]

## 2018-03-22 ENCOUNTER — TELEPHONE (OUTPATIENT)
Dept: INTERNAL MEDICINE CLINIC | Facility: CLINIC | Age: 63
End: 2018-03-22

## 2018-03-22 ENCOUNTER — APPOINTMENT (OUTPATIENT)
Dept: PHYSICAL THERAPY | Facility: CLINIC | Age: 63
End: 2018-03-22
Payer: MEDICARE

## 2018-03-22 ENCOUNTER — EVALUATION (OUTPATIENT)
Dept: OCCUPATIONAL THERAPY | Facility: CLINIC | Age: 63
End: 2018-03-22
Payer: MEDICARE

## 2018-03-22 DIAGNOSIS — F03.90 MILD DEMENTIA (HCC): Primary | ICD-10-CM

## 2018-03-22 PROCEDURE — G8991 OTHER PT/OT GOAL STATUS: HCPCS | Performed by: OCCUPATIONAL THERAPIST

## 2018-03-22 PROCEDURE — G8990 OTHER PT/OT CURRENT STATUS: HCPCS | Performed by: OCCUPATIONAL THERAPIST

## 2018-03-22 PROCEDURE — 97166 OT EVAL MOD COMPLEX 45 MIN: CPT | Performed by: OCCUPATIONAL THERAPIST

## 2018-03-22 NOTE — PROGRESS NOTES
OCCUPATIONAL THERAPY CONCUSSION INITIAL EVALUATION    2018  Mark Ariza  1955  [id number]  Aden Arrington MD  1  Mild dementia            Subjective Evaluation    Quality of life: fair          HISTORY OF PRESENT ILLNESS:     Pt is a 70 y o  male who was referred to Occupational Therapy s/p  Mild dementia [F03 90]  Pt reports noticing decline in memory and attention within this past year  Pt reports "getting sidetracked often" and forgets the tasks that he wants to complete within a day to day basis  MRI of the brain normal   All labs including thyroid function B12 CBC SMA normal   Pt with PMH of hyperlipidemia, Depression, and Anxiety  Pt reports decline in memory and attention affecting engagement in life roles  Pt with significant depression with occasional suicidal ideations  Pt not currently seeing psychologist or psychiatrist-- Cyndi Day will be recommending at this time and will provide Pt with resources to further address depression  Pt lives in a two story home with wife, oldest daughter, and two dogs  Pt is a retired [employer] employee  Pt with decreased engagement in salient tasks and spend frequent amount of time in the house with the dogs  PMH: No past medical history on file  Past Surgical Hx: No past surgical history on file  Pain Levels:      Restin    With Activity:  0      Objective     Functional Assessment     Comments  See impairment section for further details  IMPAIRMENTS SECTION:  1  Concussion Checklist:   MEMORY:  -learning new things  ATTENTION:  -keeping attention during conversation, sustained attention  EXECUTIVE FUNCTIONING:  -setting goals  COMMUNICATION:  -word finding  EMOTIONAL:  -personality changes, increased anxiety, frustration tolerance, sleep changes    2  Contextual Memory Test:    Immediate recall: , falling into the range  Delayed recall: 20, falling into the range  Recognition:        Further assess next session 2* time constraints    3  Leon Cognitive Assessment Version 7 1  Visuospatial/executive functionin/5  Naming: 3/3  Memory: 1st trial: , 2nd trial:   Attention/concentration:   List of letters:   Serial Seven Subtraction: 2/3 w/ 2 errors  Language/sentence repetition:   Language Fluency: 0/1  Abstract/Correlational Thinkin/2   Delayed Recall: 05  Orientation: 36  MoCA V1 7 1 Raw Score: 17/30,  indicative of neurocognitive impairments  4  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    5  Decreased sustained/divided attention    6  Poor memory retention/recall affecting engagement in     7  Delayed processing, poor auditory processing, decreased problem-solving, word finding-- will be further address for Speech therapy    Assessment    Assessment details: See Skilled analysis for further details  SKILLED ANALYSIS:  Pt is a 70 y o  male referred to Occupational Therapy s/p Mild dementia [F03 90]  Pt presents with poor memory retention/recall, decreased direction-following abilities with frequent cues required, delayed processing, decreased sustained/divided attention, depression/anxiety/suicidal ideations, and decreased temporal awareness affecting engagement in life roles and meaningful occupations  OTR recommending Pt for a Speech Eval to further address word finding, decreased problem-solve abilities, and poor auditory processing  OTR also recommending Pt to attend psychologist 2* significant depression and occasional suicidal ideations-- OTR to provide Pt with resources  Pt will benefit from Occupational Therapy 2x/week for 8 weeks with focus on education for internal/external memory aides and memory notebook, cog retraining, and self-care management to improve on the above deficits and enhance QOL          GOALS:    Short Term:    - Pt will increase attention to 2+ tasks for improved functional performance and engagement in salient tasks/life roles 4 weeks    - Pt will increase temporal awareness for keeping to schedule within 5 min increments, recall appointments, functional addition of time with 80% accuracy 4 weeks    - Pt will increase verbal and written direction following with processing time of <2 min and 80% accuracy for improved functional performance with life roles and meaningful occupations 4 weeks    - Pt will demo good carryover of internal and external memory aides for improved recall of daily events, orientation, improved executive functioning with 80% accuracy in 4 weeks        -    Pt will demo with G carryover of memory notebook to improve memory retention/recall and temporal awareness 4 weeks          Long  Term:   - Pt will increase attention to 3+ tasks for improved divided attention with life roles and meaningful occupations  - Pt will increase verbal and written direction following with processing time of <1 min and 85% accuracy for improved functional performance with life roles and meaningful occupations    PLANNED THERAPY INTERVENTIONS:  - memory notebook  - internal/external memory aides  - organize the hour-temporal awareness  - sustained/divided attention  - work stations   - direction-following of 1-2 step directions and multi-step directions    INTERVENTION COMMENTS:  Diagnosis: Mild dementia [F03 90]  Precautions: Hyperlipidemia, Depression, Anxiety  FOTO: 93, with 43% limitation in Memory  Insurance: Payor: MEDICARE / Plan: MEDICARE A AND B / Product Type: Medicare A & B Fee for Service /   1 of 10 visits, PN due 04/22/2018

## 2018-03-23 ENCOUNTER — OFFICE VISIT (OUTPATIENT)
Dept: INTERNAL MEDICINE CLINIC | Facility: CLINIC | Age: 63
End: 2018-03-23
Payer: COMMERCIAL

## 2018-03-23 VITALS
BODY MASS INDEX: 25.67 KG/M2 | HEART RATE: 68 BPM | WEIGHT: 168.8 LBS | RESPIRATION RATE: 14 BRPM | SYSTOLIC BLOOD PRESSURE: 130 MMHG | DIASTOLIC BLOOD PRESSURE: 80 MMHG

## 2018-03-23 DIAGNOSIS — R41.3 MEMORY LOSS: ICD-10-CM

## 2018-03-23 DIAGNOSIS — F41.9 ANXIETY: Primary | Chronic | ICD-10-CM

## 2018-03-23 DIAGNOSIS — F41.8 DEPRESSION WITH ANXIETY: Chronic | ICD-10-CM

## 2018-03-23 DIAGNOSIS — G47.30 SLEEP APNEA, UNSPECIFIED TYPE: Chronic | ICD-10-CM

## 2018-03-23 PROCEDURE — 99214 OFFICE O/P EST MOD 30 MIN: CPT | Performed by: INTERNAL MEDICINE

## 2018-03-23 NOTE — PROGRESS NOTES
Assessment/Plan:  1  Anxiety depression-becoming more of an issue  Generic Lexapro had been started in May 2017 and previously  To be improving but now deteriorating  His dose of Lexapro was increased from 10 up to 20 milligrams  We added clonazepam as 1 milligram 1/2 to 1 p o  b i d  p r n  for anxiety  I encouraged daily walking of 30 minutes  I encouraged him to be more active  He will be re-evaluated over the next 3-4 weeks  He may require addition of other meds and/or formal psychiatric referral   2   Concerned about memory-had improved with cutting back on alcohol  MRI of the brain normal   All labs including thyroid function B12 CBC SMA normal   Treating his sleep apnea in case it is contributing  Difficult to tell how much is psychiatric at this point  Will continue to monitor closely  3  Sleep apnea-mild-improved with less alcohol  Again encouraged him to use CPAP because of his comorbidities  4  Hyperlipidemia-previously  The statin because of leg weakness  He is now back on pravastatin 20 milligrams daily  He may need to add Zetia and is aware this  5  Lower urinary tract symptoms-mild-watching carefully  6  Prior concerned about kitchen 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-CT scan of the chest benign and serologies negative  7  Previously noted pneumonia superimposed on influenza-eventually all resolved of though he had a more prolonged clinical course      MEDICAL REGIMEN:      Generic Lexapro 20 milligrams daily using 10 milligram dosing, baby aspirin daily, pravastatin 20 milligrams daily, clonazepam 1 milligram-1/2 to 1 p o  b i d  p r n , CPAP    Calling in 2 weeks  Appointment over the next 4-5 weeks    Addendum-family called on April 23rd with worsening symptoms and requested a psychiatric referral-referred to [facility] psychiatry  No problem-specific Assessment & Plan notes found for this encounter         Diagnoses and all orders for this visit:    Anxiety    Depression with anxiety    Memory loss    Sleep apnea, unspecified type          Subjective:      Patient ID: Ramon Benedict is a 70 y o  male  This patient is seen today as an emergency appointment  His wife had called earlier that he had an episode this morning  She stated that the patient had an extreme anxiety attack-melt down  He says that he was very upset about an issue with some damage to their garage  He said it was hard for him to function for about 30-45 minutes  He says he is becoming increasingly more anxious  He also has noted increasing depressive symptoms  He said on a scale of 1-10 he rates himself as a 5  He is currently on Lexapro 10 milligrams daily  His wife feels much of this is due to the fact that he stays home does not get out all day  She said he just sits there and watches TV with the dogs on   There was an issue previously with the alcohol use but he has cut back significantly on his alcohol use  There was also some concerns about potential low-grade memory issues although this had improved with cutting back on his alcohol  He does not have any other substance use  This patient denies any systemic symptoms  Specifically there has been no evidence of fever, night sweats, significant weight loss or significant decrease in appetite  He says his appetite has improved and he is gaining weight  He says he anxiety depressive symptoms are all relatively recent were not an issue at earlier times in his life  He is not exercising  Apparently he is very sedentary  At prior visit family had requested physical therapy and occupational therapy regarding his memory loss-cognitive issues  Difficult to determine at this point how much is psychiatric in are gin but I feeling needs aggressive treatment of that  We had a long discussion today regarding the above  I will increase his dose of Lexapro  I added clonazepam to use an adding is basis  He may need a 2nd agent    He may require formal psychiatric referral         The following portions of the patient's history were reviewed and updated as appropriate: current medications, past family history, past medical history, past social history, past surgical history and problem list     Review of Systems   Constitutional: Positive for fatigue  Respiratory: Negative  Cardiovascular: Negative  Gastrointestinal: Negative  Endocrine: Negative  Genitourinary: Negative  Nocturia x1   Musculoskeletal: Negative  Neurological: Negative  Hematological: Negative  Psychiatric/Behavioral: Negative  Objective:      /80   Pulse 68   Resp 14   Wt 76 6 kg (168 lb 12 8 oz)   BMI 25 67 kg/m²          Physical Exam   Constitutional: He is oriented to person, place, and time  He appears well-developed and well-nourished  No distress  HENT:   Head: Normocephalic and atraumatic  Right Ear: External ear normal    Left Ear: External ear normal    Nose: Nose normal    Mouth/Throat: Oropharynx is clear and moist  No oropharyngeal exudate  Eyes: Conjunctivae and EOM are normal  Pupils are equal, round, and reactive to light  Right eye exhibits no discharge  Left eye exhibits no discharge  No scleral icterus  Neck: No JVD present  No tracheal deviation present  No thyromegaly present  Cardiovascular: Normal rate, regular rhythm, normal heart sounds and intact distal pulses  Exam reveals no gallop and no friction rub  No murmur heard  Pulmonary/Chest: Effort normal and breath sounds normal  No stridor  No respiratory distress  He has no wheezes  He has no rales  He exhibits no tenderness  Abdominal: Bowel sounds are normal  He exhibits no distension and no mass  There is no tenderness  There is no rebound and no guarding  Musculoskeletal: Normal range of motion  He exhibits no edema, tenderness or deformity  Lymphadenopathy:     He has no cervical adenopathy     Neurological: He is alert and oriented to person, place, and time  He has normal reflexes  No cranial nerve deficit  He exhibits normal muscle tone  Coordination normal    Skin: Skin is warm and dry  No rash noted  He is not diaphoretic  No erythema  No pallor  Benign keratoses   Psychiatric: He has a normal mood and affect  His behavior is normal  Judgment and thought content normal    Vitals reviewed

## 2018-03-25 NOTE — PROGRESS NOTES
Speech-Language Pathology Initial Evaluation    Today's date: 3/27/2018  Patients name: Galilea Welsh  : 1955  MRN: [id number]  Safety measures: Mild dementia, depression, anxiety  Referring provider: Obed Miller MD    Medical history significant for:   Past Medical History:   Diagnosis Date    Anxiety     Depression     Hyperlipidemia     Memory loss     Sleep apnea        Medication list:   Current Outpatient Prescriptions   Medication Sig Dispense Refill    aspirin 81 MG tablet Take 1 tablet by mouth daily      clonazePAM (KlonoPIN) 1 mg tablet Take 0 5 mg by mouth 2 (two) times a day as needed for seizures      escitalopram (LEXAPRO) 20 mg tablet Take 20 mg by mouth        pravastatin (PRAVACHOL) 20 mg tablet Take 20 mg by mouth daily         No current facility-administered medications for this visit  Allergies: Allergies   Allergen Reactions    Fish Oil + D3  [Fish Oil-Cholecalciferol]     Oseltamivir Rash       Subjective comments: "I'm good "    Reason for referral: Change in cognitive status  Prior functional status: Communication effective and appropriate in all situations  Clinically complex situations: Mental/behavioral diagnosis affecting rate of recovery    History: Patient is a 70 y o  male who was referred to outpatient skilled Speech Therapy services following his Occupational Therapy evaluation  Per review of record, patient noted a decline with his memory and attention within the past year  Patient reported "getting sidetracked often" and forgetting the tasks he wishes to complete daily  MRI of brain was WNL and all labs, including thyroid function, B12, CBC, and SMA, were unremarkable  Patient with a significant PMH of hyperlipidemia, depression, and anxiety       Hearing: Reduced (bilateral hearing aids)  Vision: WFL with glasses    Primary language: [place]   Preferred language: English     Home environment/lifestyle: Lives with wife, oldest daughter, and two dogs  Highest level of education: High school  Vocational status: Retired ([employer] )  Current/prior services being received: Occupational Therapy     Mental status: Alert  Behavior status: Cooperative  Communication modalities: Verbal  Recent speech/language therapy: None  Rehabilitation prognosis: Good rehab potential to reach the established goals    Assessments    The [facility] Financial Assessment-Geriatric: Second Edition (RIPA-) is a comprehensive, norm-referenced assessment battery designed to identify, describe, and quantify cognitive-linguistic deficits in the geriatric population (individuals 54 years and older)   The subtests of the RIPA  include: (1) Immediate Memory--repeat numbers, words, and sentences of increasing length and complexity; (2) Temporal Orientation--answer questions related to the concept of time (elicitation of accurate responses requires recall from recent and remote memory/temporal concepts require functional organizational skills); (3) Spatial Orientation--answer questions related to the concept of locations or places (elicitation of accurate responses requires recall from both recent and remote memory/spatial concepts require organizational skills, including categorization and sequencing); (4) General Information--recall general information encoded in remote memory (most stimuli represented in this subtest includes information learned by the sixth grade); (5) Situational Knowledge--respond to stimuli requiring problem solving and reasoning strategies (ability to generate response options is based on relevant information/once a solution is deduced, it must be checked against knowledge of reality and against reality itself); (6) Categorical Vocabulary--list items in a category as well as name categories names of items (elicitation of accurate responses require a semantic organization base, word finding abilities, and general organization skills; and (7) Listening Comprehension--listen to a short narrative paragraph and answer specific questions about the content (paragraphs included are of increasing length and complexity/elicitation of accurate response require skills of immediate memory, auditory sequential memory, receptive vocabulary, and processing speed)  The following results were obtained during the administration of the assessment:     Subtest: Raw score: Percentile:  Scaled Score: Degree of Severity:   1  Immediate Memory 23/39 12%tile 7 Mild   2  Temporal Orientation 28/39 5%tile 5 Moderate   3  Spatial Orientation 37/45 9%tile 7 Mild   4  General Information 41/54 16%tile 8 Mild   5  Situational Knowledge 32/45 3%tile 4 Moderate   6  Categorical Vocabulary 28/45 4%tile 4 Moderate   7  Listening Comprehension 42/72 18%tile 7 Mild     Information Processing Index:     -Sum of Scaled Scores: 42   -Composite Index: 15   -Percentile Rank: 1%tile   -Degree of Severity:  Severe       Goals  Short-term goals:  1  Patient will demonstrate functional problem solving skills and provide appropriate solutions to problems with 80% accuracy to facilitate increased safety and awareness in functional living environment (to be achieved in 4-6 weeks)  2  Patient will answer [facility]- questions regarding story read aloud with 80% accuracy to facilitate improved auditory comprehension and recall (to be achieved in 4-6 weeks)  3  Patient will be educated on word finding strategies (i e , circumlocution) for improved generative naming and verbal expression skills (to be achieved in 4-6 weeks)  4  Patient will complete concrete and abstract categorization tasks to 80% accuracy to facilitate improved generative naming skills and working memory (to be achieved in 4-6 weeks)       5  Patient will participate in continued therapeutic trials in 90% of opportunities for continued assessment and recommendations for carryover and functional tasks at home (to be achieved in 4-6 weeks)  Long-term goals:  1  Patient will improve functional cognitive-linguistic skills with the implementation of cues and external aids (to be achieved by discharge)  2  Patient will demonstrate adequate memory/reasoning/judgment to perform desired activities in a supervised environment (to be achieved by discharge)  Functional Limitations Reporting (G-codes):   Flowsheet Rows    Flowsheet Row Most Recent Value   SLP G-Codes   FOTO information reviewed  N/A   Assessment Type  Evaluation   Functional Limitations  Other Speech Language Pathology [Problem Solving]   Other Speech-Language Pathology Functional Limitation Current Status ()  CK   Other Speech-Language Pathology Functional Limitation Goal Status ()  CJ          Impressions/Recommendations    Impressions: Patient presents with moderate cognitive-linguistic deficits c/b reduced immediate & short-term memory, direction following, processing speed, sustained & divided attention, orientation, thought organization, problem solving, and verbal expression skills  Speech Therapy to address auditory processing, word finding, and problem solving  Occupational Therapy to address memory, attention, temporal awareness, and direction following  Patient demonstrated a positive score on depression screen, but denied help today  OTR presented patient with resources secondary to depression and suicidal ideation  Patient reported that Dr Brayan Espinal prescribed medication for depression/anxiety ("I think it's getting better [with the medication]  ") Patient was educated to contact his PCP if symptoms persist or worsen  Clinician will continue to monitor      Recommendations:  -Patient would benefit from outpatient skilled Speech Therapy services : Cognitive-Linguistic therapy    -Frequency: 1-2x weekly  -Duration: 4-6 weeks    -Intervention certification from: 0/32/6787  -Intervention certification to: 61/30/1204    -Intervention comments:   Initial evaluation/administration of standardized test with patient (10:00am-11:15am)  Scoring and interpretation of standardized test for the development of POC (11:15am-12:00pm)    Visit Tracking:  -Referring provider: Epic  -Billing guidelines: CMS  -Visit #1/10   -Medicare  [employer]   -RE due 04/27/2018

## 2018-03-27 ENCOUNTER — OFFICE VISIT (OUTPATIENT)
Dept: OCCUPATIONAL THERAPY | Facility: CLINIC | Age: 63
End: 2018-03-27
Payer: MEDICARE

## 2018-03-27 ENCOUNTER — EVALUATION (OUTPATIENT)
Dept: SPEECH THERAPY | Facility: CLINIC | Age: 63
End: 2018-03-27
Payer: MEDICARE

## 2018-03-27 DIAGNOSIS — F03.90 MILD DEMENTIA (HCC): ICD-10-CM

## 2018-03-27 DIAGNOSIS — R48.8 OTHER SYMBOLIC DYSFUNCTIONS (CODE): Primary | ICD-10-CM

## 2018-03-27 DIAGNOSIS — R41.3 MEMORY LOSS: ICD-10-CM

## 2018-03-27 DIAGNOSIS — F03.90 MILD DEMENTIA (HCC): Primary | ICD-10-CM

## 2018-03-27 PROCEDURE — 97535 SELF CARE MNGMENT TRAINING: CPT | Performed by: OCCUPATIONAL THERAPIST

## 2018-03-27 PROCEDURE — G9174 SPEECH LANG CURRENT STATUS: HCPCS | Performed by: SPEECH-LANGUAGE PATHOLOGIST

## 2018-03-27 PROCEDURE — G9175 SPEECH LANG GOAL STATUS: HCPCS | Performed by: SPEECH-LANGUAGE PATHOLOGIST

## 2018-03-27 PROCEDURE — 96125 COGNITIVE TEST BY HC PRO: CPT | Performed by: SPEECH-LANGUAGE PATHOLOGIST

## 2018-03-27 RX ORDER — CLONAZEPAM 1 MG/1
0.5 TABLET ORAL 2 TIMES DAILY PRN
COMMUNITY
End: 2019-02-19

## 2018-03-27 NOTE — PROGRESS NOTES
Daily Note     Today's date: 3/27/2018  Patient name: Chema Hutchins  : 1955  MRN: [id number]  Referring provider: Sapphire Bethea MD  Dx: No diagnosis found  Subjective: "It is really scary  It is scary not knowing what is going on up in my brain"  Objective: See treatment description below      Assessment: Tolerated treatment fair  Patient would benefit from continued OT     Pt completed Neuro QOL forms for both anxiety and depression as OTR is recommending Pt for neuro psych services to further address negative thinking  See below for results:    Pt engaged in the Neuro QOL Anxiety Short Form and Neuro QOL Depression Short Form in order to screen for anxiety and depressive s/s  Pt reported the following:    Anxiety- Short Form:   --used to measure anxious s/s  For scoring purposes, scores of 25+ indicate the threshold for treatment per neurology/SLIM    Score:16/40  Pt  Most often chose the response sometimes when asked about feeling anxious s/s      Depression- Short Form:   --used to measure depressive s/s  For scoring purposes, scores of 25+ indicate the threshold for treatment per neurology/SLIM    Score:22/40  Pt  Most often chose the response sometimes when asked about feeling depressive s/s  Contextual Memory Test:    Pt reports forgetting things that happened the day before and important details about 1/4 of the time  Pt reports forgetting things that people have told him and things that happened a few minutes ago 1/2 of the time  Immediate recall: 3, falling into the severe deficit range  Delayed recall: 0, falling into the severe deficit range  Recognition:  with five confabulations resulting in 8      OTR began to educate Pt on the process of memory retention and the key component parts  OTR will further educate Pt on internal/external memory aides next session to improve memory retention in home and community environments    OTR will also discuss beginning memory notebook  OTR discussed neuro psych referral to both Pt and Pt's dtr  Pt's dtr to call and schedule an appt  Plan: Continue per plan of care         INTERVENTION COMMENTS:  Diagnosis: Mild dementia [F03 90]  Precautions: Hyperlipidemia, Depression, Anxiety  FOTO: 93, with 43% limitation in Memory  Insurance: Payor: Arnulfo Britt / Plan: MEDICARE A AND B / Product Type: Medicare A & B Fee for Service /   2 of 10 visits, PN due 04/22/2018

## 2018-04-05 ENCOUNTER — OFFICE VISIT (OUTPATIENT)
Dept: OCCUPATIONAL THERAPY | Facility: CLINIC | Age: 63
End: 2018-04-05
Payer: COMMERCIAL

## 2018-04-05 ENCOUNTER — OFFICE VISIT (OUTPATIENT)
Dept: SPEECH THERAPY | Facility: CLINIC | Age: 63
End: 2018-04-05
Payer: COMMERCIAL

## 2018-04-05 DIAGNOSIS — R41.3 MEMORY LOSS: ICD-10-CM

## 2018-04-05 DIAGNOSIS — F03.90 MILD DEMENTIA (HCC): ICD-10-CM

## 2018-04-05 DIAGNOSIS — R48.8 OTHER SYMBOLIC DYSFUNCTIONS (CODE): Primary | ICD-10-CM

## 2018-04-05 DIAGNOSIS — F03.90 MILD DEMENTIA (HCC): Primary | ICD-10-CM

## 2018-04-05 PROCEDURE — 92507 TX SP LANG VOICE COMM INDIV: CPT | Performed by: SPEECH-LANGUAGE PATHOLOGIST

## 2018-04-05 PROCEDURE — 97535 SELF CARE MNGMENT TRAINING: CPT | Performed by: OCCUPATIONAL THERAPIST

## 2018-04-05 NOTE — PROGRESS NOTES
Daily Note     Today's date: 2018  Patient name: Leobardo Segal  : 1955  MRN: [id number]  Referring provider: Bradley Contreras MD  Dx: No diagnosis found  Subjective: "This thing scares me"  Objective: See treatment description below      Assessment: Tolerated treatment fair  Patient would benefit from continued OT    Pt reports having to re-park car because he parked at different facility  Pt reports noticing he forgets where he places important items  Pt with poor carryover of education on internal/external memory aides educated on last session  OTR further educated Pt on internal/external memory aides to improve memory retention and carryover in home and community environments  Pt practiced utilizing internal memory aides with immediate recall of visual information-- Pt continues with poor understanding and carryover of memory strategies with significant decreased memory retention of visual information  Pt with minimal improvement with massed practice  OTR educated Pt on chunking/grouping strategies to improve memory retention of various items-- Pt with poor carryover of strategies  Pt with various bouts of confusion and forgetfulness with redirections required  Pt with self-report of becoming "sidetracked" affecting memory and sustained attention  OTR plans to begin memory notebook with Pt at this time-- trial carryover with memory notebook  Plan: Continue per plan of care          INTERVENTION COMMENTS:  Diagnosis: Mild dementia [F03 90]  Precautions: Hyperlipidemia, Depression, Anxiety  FOTO: 93, with 43% limitation in Memory  Insurance: Payor: [address] [place] [address] / Plan: MEDICARE A AND B / Product Type: Medicare A & B Fee for Service /   3 of 10 visits, PN due 2018

## 2018-04-05 NOTE — PROGRESS NOTES
Daily Speech Treatment Note    Today's date: 2018   Patients name: Vinayak Nielsen  : 1955  MRN: [id number]   Safety measures: Mild dementia, depression, anxiety  Referring provider: Britany Francois MD    Primary Diagnosis/Billing code: S22 9  Secondary Diagnosis/ Billing code: F03 90, R41 3    Visit Tracking:  -Referring provider: Epic  -Billing guidelines: CMS  -Visit #2/10    -Medicare  [employer]   -RE due 2018  Subjective/Behavioral:  -"This is embarrassing   my memory " Patient showed clinician an external memory strategies handout given to him by OT  Patient explained that he noticed a change in his memory ever since he stopped working  Clinician reassured patient that we treat all types of disorders at this clinic, including cognitive disorders, and educated him on the purpose of therapy  Objective/Assessment:    Short-term goals:  1  Patient will demonstrate functional problem solving skills and provide appropriate solutions to problems with 80% accuracy to facilitate increased safety and awareness in functional living environment (to be achieved in 4-6 weeks)  2  Patient will answer [facility]- questions regarding story read aloud with 80% accuracy to facilitate improved auditory comprehension and recall (to be achieved in 4-6 weeks)  3  Patient will be educated on word finding strategies (i e , circumlocution) for improved generative naming and verbal expression skills (to be achieved in 4-6 weeks)  4  Patient will complete concrete and abstract categorization tasks to 80% accuracy to facilitate improved generative naming skills and working memory (to be achieved in 4-6 weeks)  -[place] categorization: Patient was provided with 3 words belonging to the same category  Patient named category label in  opp (87% acc)  Patient named an additional category member in  opp (80% acc)  Patient benefited from min-mod verbal cues to achieve 100% acc      5  Patient will participate in continued therapeutic trials in 90% of opportunities for continued assessment and recommendations for carryover and functional tasks at home (to be achieved in 4-6 weeks)  Plan:  -Patient was provided with home exercises/activities to target goals in plan of care at the end of today's session   -Continue with current plan of care

## 2018-04-06 ENCOUNTER — TELEPHONE (OUTPATIENT)
Dept: INTERNAL MEDICINE CLINIC | Facility: CLINIC | Age: 63
End: 2018-04-06

## 2018-04-06 NOTE — TELEPHONE ENCOUNTER
He is taking his meds incorrectly  Generic Lexapro-(escitalopram) should continue was 2 tabs daily    However clonazepam is supposed to be a half tablet 3 times a day only if needed for extreme anxiety-not every day

## 2018-04-08 NOTE — PROGRESS NOTES
Daily Speech Treatment Note    Today's date: 2018  Patients name: Travis Pisano  : 1955  MRN: [id number]   Safety measures: Mild dementia, depression, anxiety  Referring provider: Vivienne Sommer MD    Primary Diagnosis/Billing code: O68 5  Secondary Diagnosis/ Billing code: F03 90, R41 3    Visit Tracking:  -Referring provider: Epic  -Billing guidelines: CMS  -Visit #3/10   -Medicare  [employer]   -RE due 2018  Subjective/Behavioral:  -"It took everything to keep me going today  Mely Haskins Mely Haskins I'm tired "    Objective/Assessment:  -Reviewed HEP worksheets with patient--concrete categorization & word deduction provided first letter cues completed with 100% acc  Short-term goals:  1  Patient will demonstrate functional problem solving skills and provide appropriate solutions to problems with 80% accuracy to facilitate increased safety and awareness in functional living environment (to be achieved in 4-6 weeks)  -Problem solving: Patient was presented with a problem and asked to solve with an alternative solution (e g , "You need to change a ceiling light, but you do not have a ladder  ")  Task completed in  opp (85% acc)  2  Patient will answer [facility]- questions regarding story read aloud with 80% accuracy to facilitate improved auditory comprehension and recall (to be achieved in 4-6 weeks)  3  Patient will be educated on word finding strategies (i e , circumlocution) for improved generative naming and verbal expression skills (to be achieved in 4-6 weeks)  4  Patient will complete concrete and abstract categorization tasks to 80% accuracy to facilitate improved generative naming skills and working memory (to be achieved in 4-6 weeks)  5  Patient will participate in continued therapeutic trials in 90% of opportunities for continued assessment and recommendations for carryover and functional tasks at home (to be achieved in 4-6 weeks)     -Word finding/sequencing: Patient was presented with a category label and asked to fill-in-the-blanks with letters to form 10 words belonging to the category  Task completed in 25/40 opp (63% acc) independently, increasing to 95% acc with min semantic cues  Patient became distracted during task and benefited from mod verbal cues to redirect attention to task  Plan:  -Patient was provided with home exercises/activities to target goals in plan of care at the end of today's session   -Continue with current plan of care

## 2018-04-09 ENCOUNTER — OFFICE VISIT (OUTPATIENT)
Dept: SPEECH THERAPY | Facility: CLINIC | Age: 63
End: 2018-04-09
Payer: COMMERCIAL

## 2018-04-09 ENCOUNTER — OFFICE VISIT (OUTPATIENT)
Dept: OCCUPATIONAL THERAPY | Facility: CLINIC | Age: 63
End: 2018-04-09
Payer: COMMERCIAL

## 2018-04-09 DIAGNOSIS — R48.8 OTHER SYMBOLIC DYSFUNCTIONS: Primary | ICD-10-CM

## 2018-04-09 DIAGNOSIS — F03.90 MILD DEMENTIA (HCC): ICD-10-CM

## 2018-04-09 DIAGNOSIS — R41.3 MEMORY LOSS: ICD-10-CM

## 2018-04-09 DIAGNOSIS — F03.90 MILD DEMENTIA (HCC): Primary | ICD-10-CM

## 2018-04-09 PROCEDURE — 97535 SELF CARE MNGMENT TRAINING: CPT | Performed by: OCCUPATIONAL THERAPIST

## 2018-04-09 PROCEDURE — 92507 TX SP LANG VOICE COMM INDIV: CPT | Performed by: SPEECH-LANGUAGE PATHOLOGIST

## 2018-04-09 NOTE — PROGRESS NOTES
Daily Note     Today's date: 2018  Patient name: Juanjose Francisco  : 1955  MRN: [id number]  Referring provider: Neptali Gilbert MD  Dx:   Encounter Diagnosis     ICD-10-CM    1  Mild dementia F03 90                   Subjective: "I get so confused"  Objective: See treatment description below      Assessment: Tolerated treatment well  Patient would benefit from continued OT     Multi-matrix beginning functional task with sustained attention to one card and then advancing functional task to alternating attention between two cards (visual closure and figure ground)-- Pt required frequent verbal cues to remind Pt of the appropriate sequence of the functional task  Pt required visual anchor to improve memory retention of last step completed and for improved organization  Pt with increased time required 2* delayed processing, poor sequencing, slowed visual perceptual skills with figure ground and visual closure  Pt with improvement noted with massed practice with less frequent cues required  Pt responds positively to massed practice and repetitive tasks  OTR with plan to perform 1-2 tasks per session with Pt 2* easily overwhelmed and overstimulated to functional tasks  Plan: Continue per plan of care          INTERVENTION COMMENTS:  Diagnosis: Mild dementia [F03 90]  Precautions: Hyperlipidemia, Depression, Anxiety  FOTO: 93, with 43% limitation in Memory  Insurance: Payor: Danita Bruno / Plan: MEDICARE A AND B / Product Type: Medicare A & B Fee for Service /   4 of 10 visits, PN due 2018

## 2018-04-11 NOTE — PROGRESS NOTES
Daily Speech Treatment Note    Today's date: 2018  Patients name: Namrata Tse  : 1955  MRN: [id number]   Safety measures: Mild dementia, depression, anxiety  Referring provider: Lynne Pollard MD    Primary Diagnosis/Billing code: Y01 8  Secondary Diagnosis/ Billing code: F03 90, R41 3    Visit Tracking:  -Referring provider: Epic  -Billing guidelines: CMS  -Visit #4/10   -Medicare  [employer]   -RE due 2018  Subjective/Behavioral:  -"Here's my homework "    Objective/Assessment:  -Reviewed HEP worksheets with patient--fill-in-the-blanks with letters to form 10 words belonging to the category completed 96% acc  Short-term goals:  1  Patient will demonstrate functional problem solving skills and provide appropriate solutions to problems with 80% accuracy to facilitate increased safety and awareness in functional living environment (to be achieved in 4-6 weeks)  -Problem solving: Patient was presented with a problem and asked to solve with an alternative solution (e g , "You need to carry water, but you do not have a bucket   ")  Task completed in  opp (80% acc) with mod verbal cues for redirection to task--patient was noted to be more distractible today  2  Patient will answer [facility]- questions regarding story read aloud with 80% accuracy to facilitate improved auditory comprehension and recall (to be achieved in 4-6 weeks)  3  Patient will be educated on word finding strategies (i e , circumlocution) for improved generative naming and verbal expression skills (to be achieved in 4-6 weeks)  4  Patient will complete concrete and abstract categorization tasks to 80% accuracy to facilitate improved generative naming skills and working memory (to be achieved in 4-6 weeks)       5  Patient will participate in continued therapeutic trials in 90% of opportunities for continued assessment and recommendations for carryover and functional tasks at home (to be achieved in 4-6 weeks)  -Sequencing: Patient presented with worksheet and asked to sequence a series of 4 events in chronological order of completion  Task completed in 11/12 opp  Plan:  -Patient was provided with home exercises/activities to target goals in plan of care at the end of today's session   -Continue with current plan of care

## 2018-04-12 ENCOUNTER — OFFICE VISIT (OUTPATIENT)
Dept: SPEECH THERAPY | Facility: CLINIC | Age: 63
End: 2018-04-12
Payer: COMMERCIAL

## 2018-04-12 ENCOUNTER — OFFICE VISIT (OUTPATIENT)
Dept: OCCUPATIONAL THERAPY | Facility: CLINIC | Age: 63
End: 2018-04-12
Payer: COMMERCIAL

## 2018-04-12 DIAGNOSIS — R41.3 MEMORY LOSS: ICD-10-CM

## 2018-04-12 DIAGNOSIS — F03.90 MILD DEMENTIA (HCC): Primary | ICD-10-CM

## 2018-04-12 DIAGNOSIS — F03.90 MILD DEMENTIA (HCC): ICD-10-CM

## 2018-04-12 DIAGNOSIS — R48.8 OTHER SYMBOLIC DYSFUNCTIONS: Primary | ICD-10-CM

## 2018-04-12 PROCEDURE — 97535 SELF CARE MNGMENT TRAINING: CPT | Performed by: OCCUPATIONAL THERAPIST

## 2018-04-12 PROCEDURE — 92507 TX SP LANG VOICE COMM INDIV: CPT | Performed by: SPEECH-LANGUAGE PATHOLOGIST

## 2018-04-12 NOTE — PROGRESS NOTES
Daily Note     Today's date: 2018  Patient name: Pennie Espino  : 1955  MRN: [id number]  Referring provider: Suha Pascual MD  Dx: No diagnosis found  Subjective: "I hope I remember to use this notebook"  Objective: See treatment description below      Assessment: Tolerated treatment fair  Patient exhibited good technique with therapeutic exercises and would benefit from continued OT     OTR educated Pt and began a memory notebook with Pt to improve memory retention of important information, important dates, appointments, medication, allergies, and upcoming daily events  OTR educated Pt on the importance to utilize memory notebook each day to create routine for improved carryover  Pt with Max difficulties with identifying wife and dtr's birthdays and phone numbers  OTR to further address carryover of memory notebook next session  Memory retention of short story with 6 trials to repeat same short story to improve memory retention  Pt began with identifying x 1 detail of the short story to consistently improving with each repeat ending with identifying 8 important details  Pt becoming easily distracted to environmental stimuli disrupting sustained attention  OTR to further address memory retention next session  Plan: Continue per plan of care            INTERVENTION COMMENTS:  Diagnosis: Mild dementia [F03 90]  Precautions: Hyperlipidemia, Depression, Anxiety  FOTO: 93, with 43% limitation in Memory  Insurance: Payor: Franklin Galvan / Plan: MEDICARE A AND B / Product Type: Medicare A & B Fee for Service /   4 of 10 visits, PN due 2018

## 2018-04-16 ENCOUNTER — APPOINTMENT (OUTPATIENT)
Dept: SPEECH THERAPY | Facility: CLINIC | Age: 63
End: 2018-04-16
Payer: COMMERCIAL

## 2018-04-16 ENCOUNTER — APPOINTMENT (OUTPATIENT)
Dept: OCCUPATIONAL THERAPY | Facility: CLINIC | Age: 63
End: 2018-04-16
Payer: COMMERCIAL

## 2018-04-16 NOTE — PROGRESS NOTES
Daily Speech Treatment Note    Today's date: 2018  Patients name: Timothy Bosworth  : 1955  MRN: [id number]   Safety measures: Mild dementia, depression, anxiety  Referring provider: Giuseppe Worthy MD    Primary Diagnosis/Billing code: K85 0  Secondary Diagnosis/ Billing code: F03 90, R41 3    Visit Tracking:  -Referring provider: Epic  -Billing guidelines: CMS  -Visit #4/10  ***  -Medicare  [employer]   -RE due 2018  Subjective/Behavioral:***  -"Here's my homework "    Objective/Assessment:***  -Reviewed HEP worksheets with patient--fill-in-the-blanks with letters to form 10 words belonging to the category completed 96% acc  Short-term goals:  1  Patient will demonstrate functional problem solving skills and provide appropriate solutions to problems with 80% accuracy to facilitate increased safety and awareness in functional living environment (to be achieved in 4-6 weeks)  -Problem solving: Patient was presented with a problem and asked to solve with an alternative solution (e g , "You need to carry water, but you do not have a bucket   ")  Task completed in  opp (80% acc) with mod verbal cues for redirection to task--patient was noted to be more distractible today  2  Patient will answer [facility]- questions regarding story read aloud with 80% accuracy to facilitate improved auditory comprehension and recall (to be achieved in 4-6 weeks)  3  Patient will be educated on word finding strategies (i e , circumlocution) for improved generative naming and verbal expression skills (to be achieved in 4-6 weeks)  4  Patient will complete concrete and abstract categorization tasks to 80% accuracy to facilitate improved generative naming skills and working memory (to be achieved in 4-6 weeks)       5  Patient will participate in continued therapeutic trials in 90% of opportunities for continued assessment and recommendations for carryover and functional tasks at home (to be achieved in 4-6 weeks)  -Sequencing: Patient presented with worksheet and asked to sequence a series of 4 events in chronological order of completion  Task completed in 11/12 opp  Plan:***  -Patient was provided with home exercises/activities to target goals in plan of care at the end of today's session   -Continue with current plan of care

## 2018-04-19 ENCOUNTER — OFFICE VISIT (OUTPATIENT)
Dept: SPEECH THERAPY | Facility: CLINIC | Age: 63
End: 2018-04-19
Payer: COMMERCIAL

## 2018-04-19 ENCOUNTER — OFFICE VISIT (OUTPATIENT)
Dept: OCCUPATIONAL THERAPY | Facility: CLINIC | Age: 63
End: 2018-04-19
Payer: COMMERCIAL

## 2018-04-19 DIAGNOSIS — R48.8 OTHER SYMBOLIC DYSFUNCTIONS: Primary | ICD-10-CM

## 2018-04-19 DIAGNOSIS — R41.3 MEMORY LOSS: ICD-10-CM

## 2018-04-19 DIAGNOSIS — F03.90 MILD DEMENTIA (HCC): ICD-10-CM

## 2018-04-19 DIAGNOSIS — F03.90 MILD DEMENTIA (HCC): Primary | ICD-10-CM

## 2018-04-19 PROCEDURE — 97535 SELF CARE MNGMENT TRAINING: CPT | Performed by: OCCUPATIONAL THERAPIST

## 2018-04-19 PROCEDURE — G9175 SPEECH LANG GOAL STATUS: HCPCS | Performed by: SPEECH-LANGUAGE PATHOLOGIST

## 2018-04-19 PROCEDURE — G9176 SPEECH LANG D/C STATUS: HCPCS | Performed by: SPEECH-LANGUAGE PATHOLOGIST

## 2018-04-19 PROCEDURE — 92507 TX SP LANG VOICE COMM INDIV: CPT

## 2018-04-19 NOTE — PROGRESS NOTES
Daily Speech Treatment Note    Today's date: 2018  Patients name: Julián Snider  : 1955  MRN: [id number]   Safety measures: Mild dementia, depression, anxiety  Referring provider: Mark Kate MD    Primary Diagnosis/Billing code: R06 7  Secondary Diagnosis/ Billing code: F03 90, R41 3    Visit Tracking:  -Referring provider: Epic  -Billing guidelines: CMS  -Visit #5/10   -Medicare  [employer]   -RE due 2018  Subjective/Behavioral:  -Thankfully my basement did not flood  But I didn't want to take any chances "    Objective/Assessment:  -Reviewed HEP worksheets with patient--fill-in-the-blanks with letters to form 10 words belonging to the category completed 96% acc  Short-term goals:  1  Patient will demonstrate functional problem solving skills and provide appropriate solutions to problems with 80% accuracy to facilitate increased safety and awareness in functional living environment (to be achieved in 4-6 weeks)  2  Patient will answer [facility]- questions regarding story read aloud with 80% accuracy to facilitate improved auditory comprehension and recall (to be achieved in 4-6 weeks)  -Auditory comprehension/memory/processing: Clinician read patient sentences containing specific details  Clinician then asked patient wh-questions (who, what, where, etc )  It should be noted that clinician read each sentence 2xs in order to provide patient with extra auditory comprehension/processing time  Task completed in 15/21 opp independently, increasing to  opp with binary choice  3  Patient will be educated on word finding strategies (i e , circumlocution) for improved generative naming and verbal expression skills (to be achieved in 4-6 weeks)  -Clinician educated patient on various circumlocution strategies (i e , describing the item, similar items within a category, letter the word starts with, item function)   Clinician wrote these strategies in the patient's memory book to facilitate increased recall of strategies outside of therapy setting  4  Patient will complete concrete and abstract categorization tasks to 80% accuracy to facilitate improved generative naming skills and working memory (to be achieved in 4-6 weeks)  5  Patient will participate in continued therapeutic trials in 90% of opportunities for continued assessment and recommendations for carryover and functional tasks at home (to be achieved in 4-6 weeks)  -Word deduction: Junious Silence  Patient was presented with semantic descriptions and asked to deduct the word  Patient guessed clinician's clue words in 5/15 opp with min cues, increasing to 6/15 opp with mod cues, increasing to 14/15 opp with max cues  Plan:  -Patient was provided with home exercises/activities to target goals in plan of care at the end of today's session   -Continue with current plan of care  Patient was treated by Zuhair Mcmullen, graduate SLP student, and was under the direct supervision of OCTAVIA Lopez , CCC-SLP

## 2018-04-19 NOTE — PROGRESS NOTES
Daily Note     Today's date: 2018  Patient name: Timothy Bosworth  : 1955  MRN: [id number]  Referring provider: Giuseppe Worthy MD  Dx: No diagnosis found  Subjective:  "I forgot to look in my memory book"  Objective: See treatment description below      Assessment: Tolerated treatment fair -  Patient demonstrated with heightened fatigue levels on this date  Pt reports noticing shaking while in bed-- Pt reports "mind is making him do it"  Pt demo with poor carryover of memory notebook-- OTR reeducated Pt on the importance of utilizing memory notebook to improve memory retention in home and community environments  Reading retention/comprehension of short paragraphs-  Pt continues to present with significant decreased memory retention abilities with the requirement of re-reading short paragraphs +2 prior to retaining small details  Pt required thought provoking questions and verbal cues to improve summary of each short paragraph  BRAIN BOX with focus on memory retention of visual information with one minute time constraints-- Pt able to recall 10/ visual details  Pt with increased frustration and anxiety on this date 2* decreased memory retention  Pt reports increased fatigue levels on this date 2* decreased sleeping routines  Plan: Continue per plan of care          INTERVENTION COMMENTS:  Diagnosis: Mild dementia [F03 90]  Precautions: Hyperlipidemia, Depression, Anxiety  FOTO: 93, with 43% limitation in Memory  Insurance: Payor: MEDICARE / Plan: MEDICARE A AND B / Product Type: Medicare A & B Fee for Service /   5 of 10 visits, PN due 2018

## 2018-04-23 ENCOUNTER — APPOINTMENT (OUTPATIENT)
Dept: OCCUPATIONAL THERAPY | Facility: CLINIC | Age: 63
End: 2018-04-23
Payer: COMMERCIAL

## 2018-04-23 ENCOUNTER — APPOINTMENT (OUTPATIENT)
Dept: SPEECH THERAPY | Facility: CLINIC | Age: 63
End: 2018-04-23
Payer: COMMERCIAL

## 2018-04-23 ENCOUNTER — TELEPHONE (OUTPATIENT)
Dept: INTERNAL MEDICINE CLINIC | Facility: CLINIC | Age: 63
End: 2018-04-23

## 2018-04-23 DIAGNOSIS — F43.9 STRESS: Primary | ICD-10-CM

## 2018-04-23 DIAGNOSIS — F41.9 ANXIETY: ICD-10-CM

## 2018-04-23 PROCEDURE — G9175 SPEECH LANG GOAL STATUS: HCPCS | Performed by: SPEECH-LANGUAGE PATHOLOGIST

## 2018-04-23 PROCEDURE — G9174 SPEECH LANG CURRENT STATUS: HCPCS | Performed by: SPEECH-LANGUAGE PATHOLOGIST

## 2018-04-23 NOTE — TELEPHONE ENCOUNTER
Spoke with a lady named jessica , she stated dr Nayeli Francis if fully booked and isn't accepting new pt  She gave me Dr Michelle Jett ( office # [phone])?        Is this okay to use

## 2018-04-23 NOTE — TELEPHONE ENCOUNTER
LM ON [address] [facility] [phone] FOR   [facility] ON CALL BACK      BRENT WANTS TO KNOW WHAT TO DO WITH HIS LEGS

## 2018-04-23 NOTE — TELEPHONE ENCOUNTER
Pt wife called stating she thinks Dave Buenrostro is having more anxiety and stress than the usual    She wants him to she a psych , because she thinks that will help   Mindy Crawford Also she noticed he is having a lot of muscle cramping and tension         Please advise

## 2018-04-23 NOTE — TELEPHONE ENCOUNTER
Set him up with [facility] psychiatry-try and get Dr Eva Aldana through [place] [facility] if needed

## 2018-04-24 ENCOUNTER — TELEPHONE (OUTPATIENT)
Dept: INTERNAL MEDICINE CLINIC | Facility: CLINIC | Age: 63
End: 2018-04-24

## 2018-04-24 PROCEDURE — G8990 OTHER PT/OT CURRENT STATUS: HCPCS | Performed by: OCCUPATIONAL THERAPIST

## 2018-04-24 PROCEDURE — G8991 OTHER PT/OT GOAL STATUS: HCPCS | Performed by: OCCUPATIONAL THERAPIST

## 2018-04-24 NOTE — TELEPHONE ENCOUNTER
----- Message from Terrie Polo sent at 4/24/2018  1:28 PM EDT -----  Dr Maria Teresa Patino is private psych doc: [phone]   Chloe Anaya

## 2018-04-24 NOTE — TELEPHONE ENCOUNTER
Gera Vanessa , we have a pt , that Dr Celsa Luis wants to be seen by a psychiatry dx anxiety and depression   I have called around to find a decent appointment but I cold not  I called Dr Huy Elkins , and Dr Landen Faith no luck     Do you have anyone else in mind I can go to    Please advise  Office # [phone]  Thank you

## 2018-04-26 ENCOUNTER — APPOINTMENT (OUTPATIENT)
Dept: OCCUPATIONAL THERAPY | Facility: CLINIC | Age: 63
End: 2018-04-26
Payer: COMMERCIAL

## 2018-04-26 ENCOUNTER — APPOINTMENT (OUTPATIENT)
Dept: SPEECH THERAPY | Facility: CLINIC | Age: 63
End: 2018-04-26
Payer: COMMERCIAL

## 2018-05-04 ENCOUNTER — OFFICE VISIT (OUTPATIENT)
Dept: INTERNAL MEDICINE CLINIC | Facility: CLINIC | Age: 63
End: 2018-05-04
Payer: COMMERCIAL

## 2018-05-04 VITALS
HEIGHT: 68 IN | WEIGHT: 164.2 LBS | RESPIRATION RATE: 14 BRPM | HEART RATE: 72 BPM | DIASTOLIC BLOOD PRESSURE: 78 MMHG | BODY MASS INDEX: 24.89 KG/M2 | SYSTOLIC BLOOD PRESSURE: 120 MMHG

## 2018-05-04 DIAGNOSIS — R41.3 MEMORY LOSS: ICD-10-CM

## 2018-05-04 DIAGNOSIS — G47.30 SLEEP APNEA, UNSPECIFIED TYPE: Chronic | ICD-10-CM

## 2018-05-04 DIAGNOSIS — F41.9 ANXIETY: Primary | Chronic | ICD-10-CM

## 2018-05-04 DIAGNOSIS — F41.8 DEPRESSION WITH ANXIETY: Chronic | ICD-10-CM

## 2018-05-04 DIAGNOSIS — R53.83 FATIGUE, UNSPECIFIED TYPE: ICD-10-CM

## 2018-05-04 DIAGNOSIS — E78.01 FAMILIAL HYPERCHOLESTEROLEMIA: Chronic | ICD-10-CM

## 2018-05-04 PROCEDURE — 99214 OFFICE O/P EST MOD 30 MIN: CPT | Performed by: INTERNAL MEDICINE

## 2018-05-04 RX ORDER — ESCITALOPRAM OXALATE 10 MG/1
20 TABLET ORAL EVERY MORNING
Refills: 10 | COMMUNITY
Start: 2018-04-23 | End: 2019-02-19

## 2018-05-04 RX ORDER — PERPHENAZINE/AMITRIPTYLINE HCL 2 MG-25 MG
TABLET ORAL
COMMUNITY
End: 2022-01-01 | Stop reason: ALTCHOICE

## 2018-05-04 RX ORDER — FLUTICASONE PROPIONATE 50 MCG
2 SPRAY, SUSPENSION (ML) NASAL AS NEEDED
COMMUNITY
Start: 2017-10-13 | End: 2020-05-14 | Stop reason: SDUPTHER

## 2018-05-04 NOTE — PROGRESS NOTES
Assessment/Plan:  1  Exerting depression-with recent worsening  Lexapro increased to 20 milligrams on March 23rd  Also using clonazepam as needed  Was seeing psychology but trying to get a different psychologist   There also talked about formal psychiatrist   For now will continue current regimen  Encouraged to be more active  2   Concerned about memory-improved with cutting back on alcohol  MRI of the brain normal   All labs normal   Treating sleep apnea cases contributing  Difficult to tell much of this psychiatric at this point-observing with treatment of his anxiety  3  Sleep apnea-improved with less alcohol  Again encouraged [facility] CPAP because of his comorbidities  4  Hyperlipidemia-previously status stomach was leg weakness  Now back on pravastatin  For repeat prior to next visit  May need to add Zetia  5  Lower urinary tract symptoms-mild-watching carefully  6  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-CT scan of the chest benign and serology is negative  7  Previously noted pneumonia superimposed influenza-eventually all resolved  Follow-up chest x-ray benign  Restless legs-likely aggravated by SSRI  He does the treating his sleep apnea will also help with this  Told him he could use Klonopin before bed for treatment of this  He will call if this will become more of an issue  Holding off on other therapy at present      MEDICAL REGIMEN:      Lexapro 20 milligrams daily using 10 milligram dosing, baby aspirin daily, pravastatin 20 milligrams daily, clonazepam 1 milligram-1/2 to 1 p o  b i d  p r n  and HS p r n , CPAP    Appointment in 3 months with prior chemistry profile, cholesterol profile  No problem-specific Assessment & Plan notes found for this encounter  Diagnoses and all orders for this visit:    Anxiety    Depression with anxiety    Familial hypercholesterolemia  -     Comprehensive metabolic panel;  Future  -     Cholesterol, total; Future  - Triglycerides; Future  -     HDL cholesterol; Future  -     LDL cholesterol, direct; Future    Memory loss    Sleep apnea, unspecified type    Fatigue, unspecified type  -     Comprehensive metabolic panel; Future  -     Cholesterol, total; Future  -     Triglycerides; Future  -     HDL cholesterol; Future  -     LDL cholesterol, direct; Future    Other orders  -     Multiple Vitamin ([facility] DAILY MULTIPLE PO); Take by mouth  -     escitalopram (LEXAPRO) 10 mg tablet; Take 20 mg by mouth every morning  -     Flaxseed, Linseed, (FLAX SEED OIL) 1300 MG CAPS; Take by mouth  -     fluticasone (FLONASE) 50 mcg/act nasal spray; 2 sprays into each nostril daily          Subjective:      Patient ID: Yany Taylor is a 70 y o  male  He is improving  His anxiety is less  Family is encouraged to be more active  They are trying to set the patient up with Psychiatry but they were unavailable  He said that the counseling he was going to was pushing him too much  We tried to 5 an outside counseling group per family request   They wanted to know about stopping his statin with his memory  I told him that the literature shows that this is not felt to be an issue with memory and that actually treating his vascular disease will decrease his likelihood of father memory issues  He is currently on Lexapro 20 milligrams daily  He is using the clonazepam LEs  He has some tendency towards jerking of his legs at night  He has sleep apnea which is mild which had improved with less alcohol  I encouraged the uses CPAP with his comorbidities  He may also have some restless legs at her aggravated by the SSRI use  He was told he can use Klonopin before bed  This was a 30 minutes visit  More than 50% time was spent counseling the patient formulating a treatment plan  Multiple questions were answered  He rates at a statin    He understands the difference between status associated myalgias and arthralgias from degenerative arthritis  The following portions of the patient's history were reviewed and updated as appropriate: current medications, past family history, past medical history, past social history, past surgical history and problem list     Review of Systems   Constitutional: Positive for fatigue  Respiratory: Negative  Cardiovascular: Negative  Gastrointestinal: Negative  Endocrine: Negative  Genitourinary: Negative  Musculoskeletal: Negative  Neurological: Negative  Hematological: Negative  Psychiatric/Behavioral: Positive for sleep disturbance  The patient is nervous/anxious  Objective:      /78   Pulse 72   Resp 14   Ht 5' 8" (1 727 m)   Wt 74 5 kg (164 lb 3 2 oz)   BMI 24 97 kg/m²          Physical Exam   Constitutional: He is oriented to person, place, and time  He appears well-developed and well-nourished  No distress  HENT:   Head: Normocephalic and atraumatic  Right Ear: External ear normal    Left Ear: External ear normal    Nose: Nose normal    Mouth/Throat: Oropharynx is clear and moist  No oropharyngeal exudate  Eyes: Conjunctivae and EOM are normal  Pupils are equal, round, and reactive to light  Right eye exhibits no discharge  Left eye exhibits no discharge  No scleral icterus  Neck: No JVD present  No tracheal deviation present  No thyromegaly present  Cardiovascular: Normal rate, regular rhythm, normal heart sounds and intact distal pulses  Exam reveals no gallop and no friction rub  No murmur heard  Pulmonary/Chest: Effort normal and breath sounds normal  No stridor  No respiratory distress  He has no wheezes  He exhibits no tenderness  Abdominal: Soft  Bowel sounds are normal  He exhibits no distension and no mass  There is no tenderness  There is no rebound and no guarding  Genitourinary: Rectal exam shows guaiac negative stool  Musculoskeletal: Normal range of motion  He exhibits no edema, tenderness or deformity     Lymphadenopathy: He has no cervical adenopathy  Neurological: He is alert and oriented to person, place, and time  He has normal reflexes  No cranial nerve deficit  He exhibits normal muscle tone  Coordination normal    Skin: Skin is warm and dry  No rash noted  He is not diaphoretic  No erythema  No pallor  Psychiatric: He has a normal mood and affect  His behavior is normal  Judgment and thought content normal    Vitals reviewed

## 2018-05-18 ENCOUNTER — TELEPHONE (OUTPATIENT)
Dept: INTERNAL MEDICINE CLINIC | Facility: CLINIC | Age: 63
End: 2018-05-18

## 2018-05-18 NOTE — TELEPHONE ENCOUNTER
Patient states he has cold and he states that he wanted to take robitussin DM but with the medication hes taking for his memory it suggested that he doesn't, please advise      - cough w/phlem (small amount)    [facility] [address]      Call patient [phone]

## 2018-05-29 NOTE — PROGRESS NOTES
Addendum:     Patient's 30-day HOLD from outpatient skilled Speech Therapy services has   Patient has not contacted clinic to schedule any future appointments  Patient to be discharged from Speech Therapy at this time  If patient would like to resume therapy in the future, he will require a new prescription  Thank you

## 2018-08-01 ENCOUNTER — APPOINTMENT (OUTPATIENT)
Dept: LAB | Facility: MEDICAL CENTER | Age: 63
End: 2018-08-01
Payer: MEDICARE

## 2018-08-01 DIAGNOSIS — E78.01 FAMILIAL HYPERCHOLESTEROLEMIA: Chronic | ICD-10-CM

## 2018-08-01 DIAGNOSIS — R53.83 FATIGUE, UNSPECIFIED TYPE: ICD-10-CM

## 2018-08-01 LAB
ALBUMIN SERPL BCP-MCNC: 3.9 G/DL (ref 3.5–5)
ALP SERPL-CCNC: 90 U/L (ref 46–116)
ALT SERPL W P-5'-P-CCNC: 29 U/L (ref 12–78)
ANION GAP SERPL CALCULATED.3IONS-SCNC: 4 MMOL/L (ref 4–13)
AST SERPL W P-5'-P-CCNC: 23 U/L (ref 5–45)
BILIRUB SERPL-MCNC: 0.6 MG/DL (ref 0.2–1)
BUN SERPL-MCNC: 19 MG/DL (ref 5–25)
CALCIUM SERPL-MCNC: 9.1 MG/DL (ref 8.3–10.1)
CHLORIDE SERPL-SCNC: 105 MMOL/L (ref 100–108)
CHOLEST SERPL-MCNC: 205 MG/DL (ref 50–200)
CO2 SERPL-SCNC: 30 MMOL/L (ref 21–32)
CREAT SERPL-MCNC: 0.97 MG/DL (ref 0.6–1.3)
GFR SERPL CREATININE-BSD FRML MDRD: 82 ML/MIN/1.73SQ M
GLUCOSE P FAST SERPL-MCNC: 78 MG/DL (ref 65–99)
HDLC SERPL-MCNC: 62 MG/DL (ref 40–60)
LDLC SERPL DIRECT ASSAY-MCNC: 126 MG/DL (ref 0–100)
POTASSIUM SERPL-SCNC: 4.1 MMOL/L (ref 3.5–5.3)
PROT SERPL-MCNC: 7.1 G/DL (ref 6.4–8.2)
SODIUM SERPL-SCNC: 139 MMOL/L (ref 136–145)
TRIGL SERPL-MCNC: 58 MG/DL

## 2018-08-01 PROCEDURE — 36415 COLL VENOUS BLD VENIPUNCTURE: CPT

## 2018-08-01 PROCEDURE — 83721 ASSAY OF BLOOD LIPOPROTEIN: CPT

## 2018-08-01 PROCEDURE — 80061 LIPID PANEL: CPT

## 2018-08-01 PROCEDURE — 80053 COMPREHEN METABOLIC PANEL: CPT

## 2018-08-02 ENCOUNTER — OFFICE VISIT (OUTPATIENT)
Dept: INTERNAL MEDICINE CLINIC | Facility: CLINIC | Age: 63
End: 2018-08-02
Payer: MEDICARE

## 2018-08-02 VITALS
RESPIRATION RATE: 14 BRPM | HEIGHT: 68 IN | HEART RATE: 78 BPM | WEIGHT: 163.4 LBS | DIASTOLIC BLOOD PRESSURE: 78 MMHG | BODY MASS INDEX: 24.77 KG/M2 | SYSTOLIC BLOOD PRESSURE: 120 MMHG

## 2018-08-02 DIAGNOSIS — E78.01 FAMILIAL HYPERCHOLESTEROLEMIA: Chronic | ICD-10-CM

## 2018-08-02 DIAGNOSIS — G47.30 SLEEP APNEA, UNSPECIFIED TYPE: Chronic | ICD-10-CM

## 2018-08-02 DIAGNOSIS — Z09 NEUROLOGY FOLLOW-UP ENCOUNTER: ICD-10-CM

## 2018-08-02 DIAGNOSIS — F41.9 ANXIETY: Chronic | ICD-10-CM

## 2018-08-02 DIAGNOSIS — R41.3 MEMORY LOSS: Primary | ICD-10-CM

## 2018-08-02 DIAGNOSIS — H91.93 BILATERAL HEARING LOSS, UNSPECIFIED HEARING LOSS TYPE: ICD-10-CM

## 2018-08-02 DIAGNOSIS — F41.8 DEPRESSION WITH ANXIETY: Chronic | ICD-10-CM

## 2018-08-02 PROCEDURE — 99215 OFFICE O/P EST HI 40 MIN: CPT | Performed by: INTERNAL MEDICINE

## 2018-08-02 NOTE — PATIENT INSTRUCTIONS
Shingrix 2 doses   2nd dose 2-3 months after first dose , take 2 extra strength tylenol 2 hours beofre vaccine      Do thing to stimulate your brain   Goal exercise 150 minutes per week  Begin ezetimbe once daily

## 2018-08-02 NOTE — PROGRESS NOTES
Assessment/Plan:  1  Health maintenance-given prescription for and counseled on Shingrix vaccine  2  Anxiety with depression-now on Lexapro 20 milligrams daily and improved  Also using clonazepam as needed  He was seeing a psychologist-again encouraged to be more active-REVIEW NEXT VISIT  3  Concerned about memory  Had improved with cutting back on alcohol  MRI of the brain normal   All labs normal   Treating sleep apnea  Treating his depression  Scored 26/30 on mini-mental status exam today  Formal neurology evaluation recommended a referral letter dictated  4  Hyperlipidemia-some tendency towards intolerance of statins  Now back on low-dose pravastatin  I added Zetia 10 milligrams daily  5  Lower urinary tract symptoms-mild-watching carefully  6  Prior concerned about significant kitchen 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-serology negative for psittacosis  CT scan of chest benign  7  Previously noted pneumonia superimposed on influenza  Eventually all resolved  Follow-up chest x-ray benign  8  Restless legs-potentially aggravated by SSRI  Improved also with treating his sleep apnea-not a major issue  9  Sleep apnea-improved with less alcohol  He remains on CPAP      MEDICAL REGIMEN:      Lexapro 20 milligrams daily using 10 milligram dosing, baby aspirin daily, pravastatin 20 milligrams daily, clonazepam 1 milligram-1/2 to 1 p o  b i d  p r n  HS p r n ,, Zetia 10 milligrams daily CPAP    Appointment in several months with prior chemistry profile cholesterol profile  Await opinion of Neurology    Addendum-patient seen by Neurology  They felt major issue was his depression  They felt it was less likely had dementia  The ordered additional labs including magnesium B1 level B12  They did not order additional neuroimaging  They referred him to Psychiatry per request of his wife  He was referred to neuropsychology for mild to moderate cognitive changes    They did not add any new medication  No problem-specific Assessment & Plan notes found for this encounter  Diagnoses and all orders for this visit:    Memory loss  -     Ambulatory referral to Neurology; Future  -     CBC and differential; Future  -     Comprehensive metabolic panel; Future  -     Cholesterol, total; Future  -     Triglycerides; Future  -     LDL cholesterol, direct; Future  -     HDL cholesterol; Future    Familial hypercholesterolemia  -     CBC and differential; Future  -     Comprehensive metabolic panel; Future  -     Cholesterol, total; Future  -     Triglycerides; Future  -     LDL cholesterol, direct; Future  -     HDL cholesterol; Future    Depression with anxiety    Bilateral hearing loss, unspecified hearing loss type    Sleep apnea, unspecified type    Neurology follow-up encounter          Subjective:      Patient ID: Frankey Mole is a 70 y o  male  We talked about his memory loss in detail today  Full mini-mental state exam was performed with a score of 26  He always has a tendency to trying Re line is wife  She says she is to the point where she does not answer in a situation like this 1 when he is asking her for backup  Specifically when I asked him the urine issue he said 2012 and when I asked him it again and he did not trying get the assistance of his wife he answer correctly is 2018  Immediate recall of 3 now ounce was okay but recall 1 minutes later was absent  He has some component of depression and we have treated for that-anxiety  He is clearly doing better in that regard  He is retired now  He has become more sedentary  We again reviewed the importance of activity  We also reviewed the importance of cognitive stimulation  We elected today to proceed with neurology referral   There is not an early family history of dementia  All reversible workup in reference to memory has been negative    Prior MRI of the brain was normal   His sleep apnea has been treated    We reviewed the importance of exercise  I gave him a goal of 150 minutes per week  This patient denies any systemic symptoms  Specifically there has been no evidence of fever, night sweats, significant weight loss or significant decrease in appetite  He had laboratory testing done prior to this visit LDL not optimal at 126 HDL 62 triglycerides 58 cholesterol 205 chemistry profile normal with a creatinine of 0 97  He is ready on pravastatin 20 milligrams daily  He has issues with higher doses of statin  I added generic Zetia 10 milligrams daily    Talked about the new herpes zoster vaccine  He was given a prescription for this  He understands this is a 2 part vaccine  He understands this is more efficacious and prior vaccine  He understands there are higher incidence of side effects    This was a 40 minutes visit with more than 50% of the time spent counseling the patient formulating a treatment plan  Multiple questions were answered  He has occasional arthralgias and had some recently of his knee  He understands the difference between status associated myalgias and arthralgias from degenerative arthritis        The following portions of the patient's history were reviewed and updated as appropriate: current medications, past family history, past medical history, past social history, past surgical history and problem list     Review of Systems   Constitutional: Negative  Respiratory: Negative  Cardiovascular: Negative  Gastrointestinal: Negative  Endocrine: Negative  Genitourinary: Negative  Musculoskeletal: Negative  Right knee pain   Neurological: Negative  Memory difficulties   Hematological: Negative  Psychiatric/Behavioral: Negative  Objective:      /78   Pulse 78   Resp 14   Ht 5' 8" (1 727 m)   Wt 74 1 kg (163 lb 6 4 oz)   BMI 24 84 kg/m²          Physical Exam   Constitutional: He is oriented to person, place, and time   He appears well-developed and well-nourished  No distress  HENT:   Head: Normocephalic and atraumatic  Right Ear: External ear normal    Left Ear: External ear normal    Nose: Nose normal    Mouth/Throat: Oropharynx is clear and moist  No oropharyngeal exudate  Bilateral hearing aids   Eyes: Conjunctivae and EOM are normal  Pupils are equal, round, and reactive to light  Right eye exhibits no discharge  Left eye exhibits no discharge  No scleral icterus  Neck: Normal range of motion  Neck supple  No JVD present  No tracheal deviation present  No thyromegaly present  Cardiovascular: Normal rate, regular rhythm, normal heart sounds and intact distal pulses  Exam reveals no gallop and no friction rub  No murmur heard  Pulmonary/Chest: Effort normal and breath sounds normal  No stridor  No respiratory distress  He has no wheezes  He exhibits no tenderness  Abdominal: Bowel sounds are normal  He exhibits no distension and no mass  There is no tenderness  There is no rebound and no guarding  Genitourinary: Rectal exam shows guaiac negative stool  Musculoskeletal: Normal range of motion  He exhibits no edema, tenderness or deformity  Lymphadenopathy:     He has no cervical adenopathy  Neurological: He is alert and oriented to person, place, and time  He has normal reflexes  No cranial nerve deficit  He exhibits normal muscle tone  Coordination normal    Skin: Skin is warm and dry  No rash noted  He is not diaphoretic  No erythema  No pallor  Psychiatric: He has a normal mood and affect  His behavior is normal  Judgment and thought content normal    Vitals reviewed

## 2018-08-02 NOTE — PROGRESS NOTES
Lexapro 20 milligrams daily using 10 milligram dosing, baby aspirin daily, pravastatin 20 milligrams daily, clonazepam 1 milligram-1/2 to 1 p o  b i d  p r n  and HS p r n , CPAP

## 2018-08-08 ENCOUNTER — TELEPHONE (OUTPATIENT)
Dept: INTERNAL MEDICINE CLINIC | Facility: CLINIC | Age: 63
End: 2018-08-08

## 2018-08-08 NOTE — TELEPHONE ENCOUNTER
Pt's wife said she thought a medication was supposed to be added to his regimen and called into the pharmacy but when she went to go pick it up, nothing had been received and she couldn't remember what it was called  Please advise

## 2018-08-09 NOTE — TELEPHONE ENCOUNTER
That medicine was ezetimbe-10 milligrams 1 p o  daily dispense 90 with 3 refills-let them know this was added for additional treatment of his elevated cholesterol

## 2018-08-13 ENCOUNTER — TELEPHONE (OUTPATIENT)
Dept: INTERNAL MEDICINE CLINIC | Facility: CLINIC | Age: 63
End: 2018-08-13

## 2018-08-13 NOTE — TELEPHONE ENCOUNTER
Wife called states generic Zetia 10mg is costing $342   wants to know if can recommend a similar medication that is more low costing  She will check with insurance or pharmacy once she has obtained another medication    Thanks

## 2018-08-13 NOTE — TELEPHONE ENCOUNTER
There is no similar medication-it is the only 1 in its groupif -cost is a major issue he could take it every other day-if he opts to do this please let me know

## 2018-10-10 ENCOUNTER — CLINICAL SUPPORT (OUTPATIENT)
Dept: INTERNAL MEDICINE CLINIC | Facility: CLINIC | Age: 63
End: 2018-10-10
Payer: MEDICARE

## 2018-10-10 DIAGNOSIS — Z23 NEED FOR INFLUENZA VACCINATION: Primary | ICD-10-CM

## 2018-10-10 PROCEDURE — G0008 ADMIN INFLUENZA VIRUS VAC: HCPCS

## 2018-10-10 PROCEDURE — 90662 IIV NO PRSV INCREASED AG IM: CPT

## 2018-10-15 NOTE — PROGRESS NOTES
DEPARTMENT OF NEUROLOGICAL SCIENCES  [address] DISORDERS [facility]         NEW PATIENT EVALUATION NOTE    Patient: Marisela Caal  Medical Record Number: # [id number]  YOB: 1955  Date of visit: 10/16/2018    Referring provider: Jyoti Betts MD    Diagnoses for this encounter:  1  Mild cognitive impairment with memory loss  Ambulatory referral to Neurology    Magnesium    Vitamin B12    TSH, 3rd generation with Free T4 reflex    Vitamin D 25 hydroxy    Vitamin B1, whole blood    Ambulatory referral to Neuropsychology    Ambulatory referral to Psychiatry   2  Current severe episode of major depressive disorder without psychotic features without prior episode ([address] )     3  Conductive hearing loss, unspecified laterality     4  Tinnitus of both ears         ASSESSMENT     Impression of this 69 yo gentleman with recent history of cognitive changes spanning more noticeably in the past year and consisting of mainly memory issues  Much of his changes manifest as more impatience and frustration, but not true personality change and this may be related to his ongoing admitted depression  He has become overall more withdrawn into his home due to embarrassment and remains aware of his deficits  No clinical parkinsonism, and less likely a dementia such as Alzheimer's; More consistent with a cognitive impairment that could be medication/emotionally amplified as his symptoms are overall stable and he remains being able to handle most of his own care  We will continue to monitor  PLAN     · Checked CMP from 8/2018 and MRI brain from 2017  Will add on Magnesium, TSH, vit B1, B12, D for cognition issues  · No new neuroimaging at this time as symptoms have been stable  · Will make referral to Neuropsychology for his [facility] of 16/30 and mild-mod cognitive changes vs MMSE 26/30 in Aug 2018   Such a disparity would be more likely from poor concentration rather than a rapidly dementing process in which he would have more expected findings on examination  · Will make referral to Psychiatry for management of his depression given it can also manifest as a pseudodementia  Requested by wife  · Recommended continuing wearing his hearing aids to avoid missing conversations and issues with carrying on his daily life, adherence to an exercise regimen, sleep and hydration  · Will not add on new medications at this time  He does not require his old clonazepam and recommended avoidance of sedative-hypnotic medications  · He should continue completing crossword puzzles and keeping his mind active  · Thank you very much for sending me this interesting patient  · The patient has been instructed to call us about any new neurological problems or medication side effects  · Return to Clinic on 4 months    Franny Barnes MD  Movement Disorders  Department of Neurological [facility]    A total of 60 minutes were spent face-to-face with this patient, of which at least 50% was spent on counseling and coordination of care  We discussed the natural history of the patient's condition, differential diagnosis, level of diagnostic certainty, treatment alternatives and their side effects and possible complications  HISTORY OF PRESENT ILLNESS:  Mr David Yates is a 70 y o  right handed male with notable hx of anxiety/depression who has been referred to the [address] [facility] for evaluation of memory issues  The patient was accompanied today  History was obtained from patient and spouse    She says she first noticed a significant change in Feb 2018 he was brought to PCP by wife for a behavioral outburst and anxiety attack  He says that he was very upset about an issue with some damage to their garage  He said it was hard for him to function for about 30-45 minutes  He says he is becoming increasingly more anxious    He also has noted increasing depressive symptoms  Over time, no significant worsening or improvement  He is still able to participate in bowling with friends, and says if he tries "very hard" he would be able to remember things but overall harder to retain things  Wife thinks his short-term memory is affected but not the long-term memory as much  He is able to recall items with slight reminders      - Denies anxiety, but endorses depression and anger issues about "a lot of stuff"  He is mostly sedentary at home  He feels he sleeps "pretty well at night"  - He recounts his mother also has tinnitus and he has it constantly in both years since 20+ years  - He is retired, and used to work odd jobs for [employer]  - Hx of fractured skull as a child, but otherwise denies more recent injuries/surgeries to head or neck  - His PCP Dr Khalida Vizcarra is treating his depression with Lexapro, and his sleep apnea has been improved  MRI Brain 5/9/17  He has been seeing both Speech and Occupational Therapy  Currently he is lacking confidence in himself and less motivated to going out  No speech issues  His MMSE was 26/30 on 8/2/18 with PCP  Living Condition and Site:   ADLs: bathing dressing feeding etc have no issues  IADLs: at baseline his wife does the finances and cooks  He can drive himself           REVIEW OF PAST MEDICAL, SOCIAL AND FAMILY HISTORY:  This is the list of problems as per our Medical Records:    Patient Active Problem List    Diagnosis Date Noted    Palpitation 09/12/2017    Vitamin D deficiency 09/12/2017    Memory loss 04/05/2017    Malignant melanoma of skin ([facility] [address] ) 09/28/2016    Cough 01/04/2016    Benign prostatic hyperplasia 02/28/2015    Fatigue 02/28/2015    Dyspnea 12/30/2014    Anxiety 09/16/2014    Pain in ankle joint 09/15/2014    Backache 03/04/2014    Depression with anxiety 03/04/2014    Dizziness 03/04/2014    Loss of hearing 03/04/2014    Pain of upper extremity 02/21/2014    Elevated ALT measurement 02/18/2014    Elevated AST (SGOT) 02/18/2014    Hyperlipidemia 10/08/2013    Sleep apnea 10/08/2013    Symptoms involving urinary system 07/25/2013       Past Medical History:   Diagnosis Date    Anxiety     BPH with obstruction/lower urinary tract symptoms     LAST ASSESSED: 9/15/14    Depression     Hx of laceration of skin     LAST ASSESSED: 11/3/14    Hyperlipidemia     Long term use of drug     LAST ASSESSED: 3/8/14    Memory loss     Pneumonia     LAST ASSESSED: 5/12/14    Sleep apnea     Tachycardia     LAST ASSESSED: 3/8/14    Thrombophlebitis of superficial veins of upper extremities     LAST ASSESSED: 4/7/72    Uncomplicated alcohol abuse     LAST ASSESSED: 2/28/15    Weakness of both arms     LAST ASSESSED: 5/12/14    Weakness of both legs     LAST ASSESSED: 5/12/14        Past Surgical History:   Procedure Laterality Date    COLONOSCOPY      COMPLETE    HERNIA REPAIR       Allergies   Allergen Reactions    Fish Oil + D3 [Fish Oil-Cholecalciferol] Rash    Oseltamivir Rash     Outpatient Encounter Prescriptions as of 10/16/2018   Medication Sig Dispense Refill    aspirin 81 MG tablet Take 1 tablet by mouth daily      escitalopram (LEXAPRO) 10 mg tablet Take 20 mg by mouth every morning  10    Flaxseed, Linseed, (FLAX SEED OIL) 1300 MG CAPS Take by mouth      fluticasone (FLONASE) 50 mcg/act nasal spray 2 sprays into each nostril as needed        Multiple Vitamin ([facility] DAILY MULTIPLE PO) Take by mouth      pravastatin (PRAVACHOL) 20 mg tablet Take 20 mg by mouth daily        clonazePAM (KlonoPIN) 1 mg tablet Take 0 5 mg by mouth 2 (two) times a day as needed for seizures      [DISCONTINUED] escitalopram (LEXAPRO) 20 mg tablet Take 20 mg by mouth         No facility-administered encounter medications on file as of 10/16/2018  *Not taking the clonazepam he says       Social History   Substance Use Topics    Smoking status: Former Smoker    Smokeless tobacco: Never Used    Alcohol use Yes      Comment: BEING A SOCIAL DRINKER        Family History   Problem Relation Age of Onset    Arthritis Mother     Hyperlipidemia Mother     Dementia Mother         started late 76s    Tinnitus Mother     Arthritis Family     Hyperlipidemia Family         REVIEW OF SYSTEMS:  The patient has entered data on an intake form regarding present illness, past medical and surgical history, medications, allergies, family and social history, and a full review of 14 systems  I have reviewed this form with the patient, and all the relevant information has been included on this note  The full review of systems was negative except as stated in HPI and below  Constitutional: Negative  Negative for appetite change and fever  HENT: Positive for hearing loss and tinnitus  Negative for trouble swallowing and voice change  Eyes: Negative  Negative for photophobia and pain  Respiratory: Negative  Negative for shortness of breath  Cardiovascular: Negative  Negative for palpitations  Gastrointestinal: Negative  Negative for nausea and vomiting  Endocrine: Negative  Negative for cold intolerance and heat intolerance  Genitourinary: Negative  Negative for dysuria, frequency and urgency  Musculoskeletal: Negative  Negative for myalgias and neck pain  Skin: Negative  Negative for rash  Neurological: Negative  Negative for dizziness, tremors, seizures, syncope, facial asymmetry, speech difficulty, weakness, light-headedness, numbness and headaches  Memory problems   Snoring    Hematological: Negative  Does not bruise/bleed easily  Psychiatric/Behavioral: Negative  Negative for confusion, hallucinations and sleep disturbance          Depression      PHYSICAL EXAMINATION:     Vital signs:  /60 (BP Location: Left arm, Patient Position: Sitting, Cuff Size: Standard)   Ht 5' 8" (1 727 m)   Wt 74 4 kg (164 lb)   BMI 24 94 kg/m²     General:  Well-appearing but slightly anxious, well nourished, pleasant patient in no acute distress  Head:  Normocephalic, atraumatic  Oropharynx and conjunctiva are clear  Speech  No hypophonia or bradylalia  No scanning speech  Language: Comprehension intact  Neck:  Supple, strong 5/5 forward flexion and retroflexion  Extremities: Range of motion is normal  +Red Hand sign     Cognitive and Mental Exam:      Points MAX   Visuospatial  ----------   Trails A 1 1   Cube Drawing 0 1   Clock Drawing 2 3   Naming Objects 3 3   Attention  ----------   Digit Span 1 2   Letter Reading 1 1   Serial 7s 2 3   Language  ----------   Repetition 0 2   Fluency 0 1   Abstraction 1 2   Delayed Recall 0 5   Orientation               5             6              16 30    +0 for 12th grade edu = 16  (addendum: corrected score)    Frontal Release Signs:  - Glabellar Tap: none  - Snout Sign:  none  - Grasp Reflex: none  - Palmomental Sign: none   - Applause Sign: none    Apraxia: none  Luria three-step-test: < 4 in ten sec  Copying hand gestures: difficulty    Cranial Nerves:  Funduscopic examination reveals no papilledema  Direct and consensual light reflexes were normal  No afferent pupillary defect  Visual fields are full to confrontation  Extraocular movements were full, with normal pursuit and saccades  Pupils were equal, reactive to light symmetrically  Facial sensation to light touch was intact  Face is symmetric with normal strength  Hearing was assessed using the Calibrated Finger Rub Auditory Screening Test (CALFRAST) and was not abnormal (Better than CALFRAST-Strong-70)  Palate is up going bilaterally and symmetrically  Neck muscles are strong  Tongue protrusion is at midline with normal movements  No dysarthria        Motor:    MDS-UPDRS III:    Speech: 1  Facial Expression: 2  Tremor (Head):  0  Rest Tremor Severity (RUE/LUE/RLE/LLE/Lip): 0  Action Tremor of Hands (R): 0  Action Tremor of Hands (L): 0  Finger tapping (R): 1  Finger tapping (L): 1  Hand clenching (R): 1  Hand clenching (L): 1  SONAL Hand (R): 0  SONAL Hand (L): 0  Toe Tapping (R):  0  Toe Tapping (L):  0  Leg Agility (R):  0  Leg Agility (L):  0  Rigidity (Neck): 0  Rigidity (RUE): 0  Rigidity (LUE): 0  Rigidity (RLE): 0  Rigidity (LLE): 0  Arising From Chair: 0  Posture: 0  Gait: 0  Freezing of Gait: 0  Postural Stability: 0  Body Bradykinesia: 0  -------------------------------------------------------------------------------------    Muscle Strength Right Left  Muscle Strength Right Left   Deltoid 5/5 5/5  Hip Adductors 5/5 5/5   Biceps 5/5 5/5  Hip Abductors 5/5 5/5   Triceps 5/5 5/5  Knee Extensors 5/5 5/5   Wrist Extensors 5/5 5/5  Knee Flexors 5/5 5/5   Wrist Flexors 5/5 5/5  Ankle Extensors 5/5 5/5    5/5 5/5  Ankle Flexors 5/5 5/5   Finger Abductors 5/5 5/5       Hip Flexors 5/5 5/5   Hip Extensors 5/5 5/5     Sensory  Intact to LT, temp and vibration  Coordination:  Finger-to-nose-finger: normal  Sequential Finger Movements: normal  Hand rhythm tapping: normal  Finger-following-finger: normal     Gait:  Normal comprehensive gait evaluation, has normal raising, stance, gait, turns, AS, tip-toes, heels and Pull test    +reduced left arm swing compared to R     Reflexes:    Right Left   Biceps 2/4 2/4   Brachioradialis 2/4 2/4   Triceps 2/4 2/4   Knee 2/4 2/4   Ankle 2/4 2/4      Plantar cutaneous reflex:  Right: flexor  Left: flexor      REVIEW OF ANCILLARY TESTS:     Results for orders placed or performed during the hospital encounter of 05/09/17   MRI brain wo contrast    Narrative    MRI BRAIN WITHOUT CONTRAST    INDICATION:   [id number]  History: pt co cognitive decline "cant think good no more"     COMPARISON:   None  TECHNIQUE:  Sagittal T1, axial T2, axial FLAIR, axial T1, axial Lathrop and axial diffusion imaging  Coronal T2    IMAGE QUALITY:  Diagnostic  FINDINGS:    BRAIN PARENCHYMA:  There is no discrete mass, mass effect or midline shift   No abnormal white matter signal identified  Brainstem and cerebellum demonstrate normal signal  There is no intracranial hemorrhage  There is no evidence of acute infarction and   diffusion imaging is unremarkable  VENTRICLES:  The ventricles are normal in size and contour  SELLA AND PITUITARY GLAND:  Normal     ORBITS:  Normal     PARANASAL SINUSES:  Normal     VASCULATURE:  Evaluation of the major intracranial vasculature demonstrates appropriate flow voids  CALVARIUM AND SKULL BASE:  Normal     EXTRACRANIAL SOFT TISSUES:  Normal       Impression    1  No acute infarction, intracranial hemorrhage or mass effect        Workstation performed: [id number]

## 2018-10-16 ENCOUNTER — OFFICE VISIT (OUTPATIENT)
Dept: NEUROLOGY | Facility: CLINIC | Age: 63
End: 2018-10-16
Payer: MEDICARE

## 2018-10-16 VITALS
WEIGHT: 164 LBS | DIASTOLIC BLOOD PRESSURE: 60 MMHG | HEIGHT: 68 IN | SYSTOLIC BLOOD PRESSURE: 132 MMHG | BODY MASS INDEX: 24.86 KG/M2

## 2018-10-16 DIAGNOSIS — H90.2 CONDUCTIVE HEARING LOSS, UNSPECIFIED LATERALITY: ICD-10-CM

## 2018-10-16 DIAGNOSIS — G31.84 MILD COGNITIVE IMPAIRMENT WITH MEMORY LOSS: Primary | ICD-10-CM

## 2018-10-16 DIAGNOSIS — H93.13 TINNITUS OF BOTH EARS: ICD-10-CM

## 2018-10-16 DIAGNOSIS — F32.2 CURRENT SEVERE EPISODE OF MAJOR DEPRESSIVE DISORDER WITHOUT PSYCHOTIC FEATURES WITHOUT PRIOR EPISODE (HCC): ICD-10-CM

## 2018-10-16 PROCEDURE — 99205 OFFICE O/P NEW HI 60 MIN: CPT | Performed by: PSYCHIATRY & NEUROLOGY

## 2018-10-16 NOTE — PROGRESS NOTES
Review of Systems   Constitutional: Negative  Negative for appetite change and fever  HENT: Positive for hearing loss and tinnitus  Negative for trouble swallowing and voice change  Eyes: Negative  Negative for photophobia and pain  Respiratory: Negative  Negative for shortness of breath  Cardiovascular: Negative  Negative for palpitations  Gastrointestinal: Negative  Negative for nausea and vomiting  Endocrine: Negative  Negative for cold intolerance and heat intolerance  Genitourinary: Negative  Negative for dysuria, frequency and urgency  Musculoskeletal: Negative  Negative for myalgias and neck pain  Skin: Negative  Negative for rash  Neurological: Negative  Negative for dizziness, tremors, seizures, syncope, facial asymmetry, speech difficulty, weakness, light-headedness, numbness and headaches  Memory problems   Snoring    Hematological: Negative  Does not bruise/bleed easily  Psychiatric/Behavioral: Negative  Negative for confusion, hallucinations and sleep disturbance          Depression

## 2018-10-16 NOTE — PATIENT INSTRUCTIONS
1  Mild cognitive impairment with memory loss  Ambulatory referral to Neurology    Magnesium    Vitamin B12    TSH, 3rd generation with Free T4 reflex    Vitamin D 25 hydroxy    Vitamin B1, whole blood    Ambulatory referral to Neuropsychology    Ambulatory referral to Psychiatry   2   Current severe episode of major depressive disorder without psychotic features without prior episode ([facility] [address] )

## 2018-10-16 NOTE — LETTER
October 16, 2018     Zahira Fletcher MD  [address]  [address] [facility], [address] [place] [address]    Patient: Daphney Canchola   YOB: 1955   Date of Visit: 10/16/2018       Dear Dr Filippo Abdi: Thank you for referring Daphney Canchola to me for evaluation  Below are my notes for this consultation  If you have questions, please do not hesitate to call me  I look forward to following your patient along with you  Sincerely,        Emely Thorpe MD        CC: No Recipients  Emely Thorpe MD  10/16/2018  5:07 PM  Sign at close encounter  [facility] PATIENT EVALUATION NOTE    Patient: Daphney Canchola  Medical Record Number: # [id number]  YOB: 1955  Date of visit: 10/16/2018    Referring provider: Ryan Roland MD    Diagnoses for this encounter:  1  Mild cognitive impairment with memory loss  Ambulatory referral to Neurology    Magnesium    Vitamin B12    TSH, 3rd generation with Free T4 reflex    Vitamin D 25 hydroxy    Vitamin B1, whole blood    Ambulatory referral to Neuropsychology    Ambulatory referral to Psychiatry   2  Current severe episode of major depressive disorder without psychotic features without prior episode ([facility] [address] )     3  Conductive hearing loss, unspecified laterality     4  Tinnitus of both ears         ASSESSMENT     Impression of this 69 yo gentleman with recent history of cognitive changes spanning more noticeably in the past year and consisting of mainly memory issues  Much of his changes manifest as more impatience and frustration, but not true personality change and this may be related to his ongoing admitted depression  He has become overall more withdrawn into his home due to embarrassment and remains aware of his deficits   No clinical parkinsonism, and less likely a dementia such as Alzheimer's; More consistent with a cognitive impairment that could be medication/emotionally amplified as his symptoms are overall stable and he remains being able to handle most of his own care  We will continue to monitor  PLAN     · Checked CMP from 8/2018 and MRI brain from 2017  Will add on Magnesium, TSH, vit B1, B12, D for cognition issues  · No new neuroimaging at this time as symptoms have been stable  · Will make referral to Neuropsychology for his [address] of 17/30 and mild-mod cognitive changes  · Will make referral to Psychiatry for management of his depression given it can also manifest as a pseudodementia  Requested by wife  · Recommended continuing wearing his hearing aids to avoid missing conversations and issues with carrying on his daily life, adherence to an exercise regimen, sleep and hydration  · Will not add on new medications at this time  He does not require his old clonazepam and recommended avoidance of sedative-hypnotic medications  · He should continue completing crossword puzzles and keeping his mind active  · Thank you very much for sending me this interesting patient  · The patient has been instructed to call us about any new neurological problems or medication side effects  · Return to Clinic on 4 months    Megan Babb MD  Movement Disorders  Department of Neurological [address] [facility]    A total of 60 minutes were spent face-to-face with this patient, of which at least 50% was spent on counseling and coordination of care  We discussed the natural history of the patient's condition, differential diagnosis, level of diagnostic certainty, treatment alternatives and their side effects and possible complications  HISTORY OF PRESENT ILLNESS:  Mr Trevon Gray is a 70 y o  right handed male with notable hx of anxiety/depression who has been referred to the [facility] [facility] [address] for evaluation of memory issues  The patient was accompanied today   History was obtained from patient and spouse    She says she first noticed a significant change in Feb 2018 he was brought to PCP by wife for a behavioral outburst and anxiety attack  He says that he was very upset about an issue with some damage to their garage  He said it was hard for him to function for about 30-45 minutes  He says he is becoming increasingly more anxious  He also has noted increasing depressive symptoms  Over time, no significant worsening or improvement  He is still able to participate in bowling with friends, and says if he tries "very hard" he would be able to remember things but overall harder to retain things  Wife thinks his short-term memory is affected but not the long-term memory as much  He is able to recall items with slight reminders      - Denies anxiety, but endorses depression and anger issues about "a lot of stuff"  He is mostly sedentary at home  He feels he sleeps "pretty well at night"  - He recounts his mother also has tinnitus and he has it constantly in both years since 20+ years  - He is retired, and used to work odd jobs for [employer]  - Hx of fractured skull as a child, but otherwise denies more recent injuries/surgeries to head or neck  - His PCP Dr Antonella Villalba is treating his depression with Lexapro, and his sleep apnea has been improved  MRI Brain 5/9/17  He has been seeing both Speech and Occupational Therapy  Currently he is lacking confidence in himself and less motivated to going out  No speech issues  His MMSE was 26/30 on 8/2/18 with PCP  Living Condition and Site:   ADLs: bathing dressing feeding etc have no issues  IADLs: at baseline his wife does the finances and cooks  He can drive himself           REVIEW OF PAST MEDICAL, SOCIAL AND FAMILY HISTORY:  This is the list of problems as per our Medical Records:    Patient Active Problem List    Diagnosis Date Noted    Palpitation 09/12/2017    Vitamin D deficiency 09/12/2017    Memory loss 04/05/2017    Malignant melanoma of skin ([facility] [address] ) 09/28/2016    Cough 01/04/2016    Benign prostatic hyperplasia 02/28/2015    Fatigue 02/28/2015    Dyspnea 12/30/2014    Anxiety 09/16/2014    Pain in ankle joint 09/15/2014    Backache 03/04/2014    Depression with anxiety 03/04/2014    Dizziness 03/04/2014    Loss of hearing 03/04/2014    Pain of upper extremity 02/21/2014    Elevated ALT measurement 02/18/2014    Elevated AST (SGOT) 02/18/2014    Hyperlipidemia 10/08/2013    Sleep apnea 10/08/2013    Symptoms involving urinary system 07/25/2013       Past Medical History:   Diagnosis Date    Anxiety     BPH with obstruction/lower urinary tract symptoms     LAST ASSESSED: 9/15/14    Depression     Hx of laceration of skin     LAST ASSESSED: 11/3/14    Hyperlipidemia     Long term use of drug     LAST ASSESSED: 3/8/14    Memory loss     Pneumonia     LAST ASSESSED: 5/12/14    Sleep apnea     Tachycardia     LAST ASSESSED: 3/8/14    Thrombophlebitis of superficial veins of upper extremities     LAST ASSESSED: 1/5/24    Uncomplicated alcohol abuse     LAST ASSESSED: 2/28/15    Weakness of both arms     LAST ASSESSED: 5/12/14    Weakness of both legs     LAST ASSESSED: 5/12/14        Past Surgical History:   Procedure Laterality Date    COLONOSCOPY      COMPLETE    HERNIA REPAIR       Allergies   Allergen Reactions    Fish Oil + D3 [Fish Oil-Cholecalciferol] Rash    Oseltamivir Rash     Outpatient Encounter Prescriptions as of 10/16/2018   Medication Sig Dispense Refill    aspirin 81 MG tablet Take 1 tablet by mouth daily      escitalopram (LEXAPRO) 10 mg tablet Take 20 mg by mouth every morning  10    Flaxseed, Linseed, (FLAX SEED OIL) 1300 MG CAPS Take by mouth      fluticasone (FLONASE) 50 mcg/act nasal spray 2 sprays into each nostril as needed        Multiple Vitamin ([facility] DAILY MULTIPLE PO) Take by mouth      pravastatin (PRAVACHOL) 20 mg tablet Take 20 mg by mouth daily        clonazePAM (KlonoPIN) 1 mg tablet Take 0 5 mg by mouth 2 (two) times a day as needed for seizures      [DISCONTINUED] escitalopram (LEXAPRO) 20 mg tablet Take 20 mg by mouth         No facility-administered encounter medications on file as of 10/16/2018  *Not taking the clonazepam he says  Social History   Substance Use Topics    Smoking status: Former Smoker    Smokeless tobacco: Never Used    Alcohol use Yes      Comment: BEING A SOCIAL DRINKER        Family History   Problem Relation Age of Onset    Arthritis Mother     Hyperlipidemia Mother     Dementia Mother         started late 76s    Tinnitus Mother     Arthritis Family     Hyperlipidemia Family         REVIEW OF SYSTEMS:  The patient has entered data on an intake form regarding present illness, past medical and surgical history, medications, allergies, family and social history, and a full review of 14 systems  I have reviewed this form with the patient, and all the relevant information has been included on this note  The full review of systems was negative except as stated in HPI and below  Constitutional: Negative  Negative for appetite change and fever  HENT: Positive for hearing loss and tinnitus  Negative for trouble swallowing and voice change  Eyes: Negative  Negative for photophobia and pain  Respiratory: Negative  Negative for shortness of breath  Cardiovascular: Negative  Negative for palpitations  Gastrointestinal: Negative  Negative for nausea and vomiting  Endocrine: Negative  Negative for cold intolerance and heat intolerance  Genitourinary: Negative  Negative for dysuria, frequency and urgency  Musculoskeletal: Negative  Negative for myalgias and neck pain  Skin: Negative  Negative for rash  Neurological: Negative  Negative for dizziness, tremors, seizures, syncope, facial asymmetry, speech difficulty, weakness, light-headedness, numbness and headaches  Memory problems   Snoring    Hematological: Negative    Does not bruise/bleed easily  Psychiatric/Behavioral: Negative  Negative for confusion, hallucinations and sleep disturbance  Depression      PHYSICAL EXAMINATION:     Vital signs:  /60 (BP Location: Left arm, Patient Position: Sitting, Cuff Size: Standard)   Ht 5' 8" (1 727 m)   Wt 74 4 kg (164 lb)   BMI 24 94 kg/m²      General:  Well-appearing but slightly anxious, well nourished, pleasant patient in no acute distress  Head:  Normocephalic, atraumatic  Oropharynx and conjunctiva are clear  Speech  No hypophonia or bradylalia  No scanning speech  Language: Comprehension intact  Neck:  Supple, strong 5/5 forward flexion and retroflexion  Extremities: Range of motion is normal  +Red Hand sign     Cognitive and Mental Exam:      Points MAX   Visuospatial  ----------   Trails A 1 1   Cube Drawing 0 1   Clock Drawing 2 3   Naming Objects 3 3   Attention  ----------   Digit Span 1 2   Letter Reading 1 1   Serial 7s 2 3   Language  ----------   Repetition 0 2   Fluency 0 1   Abstraction 1 2   Delayed Recall 0 5   Orientation               5             6              15 30    +0 for 12th grade edu = 15    Frontal Release Signs:  - Glabellar Tap: none  - Snout Sign:  none  - Grasp Reflex: none  - Palmomental Sign: none   - Applause Sign: none    Apraxia: none  Luria three-step-test: < 4 in ten sec  Copying hand gestures: difficulty    Cranial Nerves:  Funduscopic examination reveals no papilledema  Direct and consensual light reflexes were normal  No afferent pupillary defect  Visual fields are full to confrontation  Extraocular movements were full, with normal pursuit and saccades  Pupils were equal, reactive to light symmetrically  Facial sensation to light touch was intact  Face is symmetric with normal strength  Hearing was assessed using the Calibrated Finger Rub Auditory Screening Test (CALFRAST) and was not abnormal (Better than CALFRAST-Strong-70)      Palate is up going bilaterally and symmetrically  Neck muscles are strong  Tongue protrusion is at midline with normal movements  No dysarthria  Motor:    MDS-UPDRS III:    Speech: 1  Facial Expression: 2  Tremor (Head):  0  Rest Tremor Severity (RUE/LUE/RLE/LLE/Lip): 0  Action Tremor of Hands (R): 0  Action Tremor of Hands (L): 0  Finger tapping (R): 1  Finger tapping (L): 1  Hand clenching (R): 1  Hand clenching (L): 1  SONAL Hand (R): 0  SONAL Hand (L): 0  Toe Tapping (R):   0  Toe Tapping (L):   0  Leg Agility (R):   0  Leg Agility (L):   0  Rigidity (Neck): 0  Rigidity (RUE): 0  Rigidity (LUE): 0  Rigidity (RLE): 0  Rigidity (LLE): 0  Arising From Chair: 0  Posture: 0  Gait: 0  Freezing of Gait: 0  Postural Stability: 0  Body Bradykinesia: 0  -------------------------------------------------------------------------------------    Muscle Strength Right Left  Muscle Strength Right Left   Deltoid 5/5 5/5  Hip Adductors 5/5 5/5   Biceps 5/5 5/5  Hip Abductors 5/5 5/5   Triceps 5/5 5/5  Knee Extensors 5/5 5/5   Wrist Extensors 5/5 5/5  Knee Flexors 5/5 5/5   Wrist Flexors 5/5 5/5  Ankle Extensors 5/5 5/5    5/5 5/5  Ankle Flexors 5/5 5/5   Finger Abductors 5/5 5/5       Hip Flexors 5/5 5/5   Hip Extensors 5/5 5/5     Sensory  Intact to LT, temp and vibration       Coordination:  Finger-to-nose-finger: normal  Sequential Finger Movements: normal  Hand rhythm tapping: normal  Finger-following-finger: normal     Gait:  Normal comprehensive gait evaluation, has normal raising, stance, gait, turns, AS, tip-toes, heels and Pull test    +reduced left arm swing compared to R     Reflexes:    Right Left   Biceps 2/4 2/4   Brachioradialis 2/4 2/4   Triceps 2/4 2/4   Knee 2/4 2/4   Ankle 2/4 2/4      Plantar cutaneous reflex:  Right: flexor  Left: flexor      REVIEW OF ANCILLARY TESTS:     Results for orders placed or performed during the hospital encounter of 05/09/17   MRI brain wo contrast    Narrative    MRI BRAIN WITHOUT CONTRAST    INDICATION:   r41 [id number]  History: pt co cognitive decline "cant think good no more"     COMPARISON:   None  TECHNIQUE:  Sagittal T1, axial T2, axial FLAIR, axial T1, axial [place] and axial diffusion imaging  Coronal T2    IMAGE QUALITY:  Diagnostic  FINDINGS:    BRAIN PARENCHYMA:  There is no discrete mass, mass effect or midline shift  No abnormal white matter signal identified  Brainstem and cerebellum demonstrate normal signal  There is no intracranial hemorrhage  There is no evidence of acute infarction and   diffusion imaging is unremarkable  VENTRICLES:  The ventricles are normal in size and contour  SELLA AND PITUITARY GLAND:  Normal     ORBITS:  Normal     PARANASAL SINUSES:  Normal     VASCULATURE:  Evaluation of the major intracranial vasculature demonstrates appropriate flow voids  CALVARIUM AND SKULL BASE:  Normal     EXTRACRANIAL SOFT TISSUES:  Normal       Impression    1  No acute infarction, intracranial hemorrhage or mass effect        Workstation performed: [id number]

## 2018-11-03 ENCOUNTER — APPOINTMENT (OUTPATIENT)
Dept: LAB | Facility: MEDICAL CENTER | Age: 63
End: 2018-11-03
Payer: MEDICARE

## 2018-11-03 ENCOUNTER — OFFICE VISIT (OUTPATIENT)
Dept: URGENT CARE | Facility: MEDICAL CENTER | Age: 63
End: 2018-11-03
Payer: MEDICARE

## 2018-11-03 VITALS
SYSTOLIC BLOOD PRESSURE: 110 MMHG | TEMPERATURE: 97.6 F | HEIGHT: 67 IN | HEART RATE: 72 BPM | WEIGHT: 161.2 LBS | OXYGEN SATURATION: 97 % | DIASTOLIC BLOOD PRESSURE: 68 MMHG | RESPIRATION RATE: 18 BRPM | BODY MASS INDEX: 25.3 KG/M2

## 2018-11-03 DIAGNOSIS — S30.860A TICK BITE OF BACK, INITIAL ENCOUNTER: Primary | ICD-10-CM

## 2018-11-03 DIAGNOSIS — G31.84 MILD COGNITIVE IMPAIRMENT WITH MEMORY LOSS: ICD-10-CM
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
## 2018-11-03 LAB
25(OH)D3 SERPL-MCNC: 48.8 NG/ML (ref 30–100)
MAGNESIUM SERPL-MCNC: 2.1 MG/DL (ref 1.6–2.6)
TSH SERPL DL<=0.05 MIU/L-ACNC: 1.71 UIU/ML (ref 0.36–3.74)
VIT B12 SERPL-MCNC: 763 PG/ML (ref 100–900)

## 2018-11-03 PROCEDURE — 84443 ASSAY THYROID STIM HORMONE: CPT

## 2018-11-03 PROCEDURE — 36415 COLL VENOUS BLD VENIPUNCTURE: CPT

## 2018-11-03 PROCEDURE — 90715 TDAP VACCINE 7 YRS/> IM: CPT

## 2018-11-03 PROCEDURE — 84425 ASSAY OF VITAMIN B-1: CPT

## 2018-11-03 PROCEDURE — G0463 HOSPITAL OUTPT CLINIC VISIT: HCPCS | Performed by: PHYSICIAN ASSISTANT

## 2018-11-03 PROCEDURE — 82607 VITAMIN B-12: CPT

## 2018-11-03 PROCEDURE — 82306 VITAMIN D 25 HYDROXY: CPT

## 2018-11-03 PROCEDURE — 99213 OFFICE O/P EST LOW 20 MIN: CPT | Performed by: PHYSICIAN ASSISTANT

## 2018-11-03 PROCEDURE — 83735 ASSAY OF MAGNESIUM: CPT

## 2018-11-03 NOTE — PATIENT INSTRUCTIONS
Insect Bite or Sting   WHAT YOU NEED TO KNOW:   Most insect bites and stings are not dangerous and go away without treatment  Your symptoms may be mild, or you may develop anaphylaxis  Anaphylaxis is a sudden, life-threatening reaction that needs immediate treatment  Common examples of insects that bite or sting are bees, ticks, mosquitoes, spiders, and ants  Insect bites or stings can lead to diseases such as malaria, West Nile virus, Lyme disease, or [place] Spotted Fever  DISCHARGE INSTRUCTIONS:   Call 911 for signs or symptoms of anaphylaxis,  such as trouble breathing, swelling in your mouth or throat, or wheezing  You may also have itching, a rash, hives, or feel like you are going to faint  Return to the emergency department if:   · You are stung on your tongue or in your throat  · A white area forms around the bite  · You are sweating badly or have body pain  · You think you were bitten or stung by a poisonous insect  Contact your healthcare provider if:   · You have a fever  · The area becomes red, warm, tender, and swollen beyond the area of the bite or sting  · You have questions or concerns about your condition or care  Medicines:   · Antihistamines  decrease itching and rash  · Epinephrine  is used to treat severe allergic reactions such as anaphylaxis  · Take your medicine as directed  Contact your healthcare provider if you think your medicine is not helping or if you have side effects  Tell him of her if you are allergic to any medicine  Keep a list of the medicines, vitamins, and herbs you take  Include the amounts, and when and why you take them  Bring the list or the pill bottles to follow-up visits  Carry your medicine list with you in case of an emergency  Steps to take for signs or symptoms of anaphylaxis:   · Immediately  give 1 shot of epinephrine only into the outer thigh muscle  · Leave the shot in place  as directed   Your healthcare provider may recommend you leave it in place for up to 10 seconds before you remove it  This helps make sure all of the epinephrine is delivered  · Call 911 and go to the emergency department,  even if the shot improved symptoms  Do not drive yourself  Bring the used epinephrine shot with you  Safety precautions to take if you are at risk for anaphylaxis:   · Keep 2 shots of epinephrine with you at all times  You may need a second shot, because epinephrine only works for about 20 minutes and symptoms may return  Your healthcare provider can show you and family members how to give the shot  Check the expiration date every month and replace it before it expires  · Create an action plan  Your healthcare provider can help you create a written plan that explains the allergy and an emergency plan to treat a reaction  The plan explains when to give a second epinephrine shot if symptoms return or do not improve after the first  Give copies of the action plan and emergency instructions to family members, work and school staff, and  providers  Show them how to give a shot of epinephrine  · Carry medical alert identification  Wear medical alert jewelry or carry a card that says you have an insect allergy  Ask your healthcare provider where to get these items  If an insect bites or stings you:   · Remove the stinger  Scrape the stinger out with your fingernail, edge of a credit card, or a knife blade  Do not squeeze the wound  Gently wash the area with soap and water  · Remove the tick  Ticks must be removed as soon as possible so you do not get diseases passed through tick bites  Ask your healthcare provider for more information on tick bites and how to remove ticks  Care for a bite or sting wound:   · Elevate the affected area  Prop the wound above the level of your heart, if possible  Elevate the area for 10 to 20 minutes each hour or as directed by your healthcare provider  · Use compresses    Soak a clean washcloth in cold water, wring it out, and put it on the bite or sting  Use the compress for 10 to 20 minutes each hour or as directed by your healthcare provider  After 24 to 48 hours, change to warm compresses  · Apply a paste  Add water to baking soda to make a thick paste  Put the paste on the area for 5 minutes  Rinse gently to remove the paste  Prevent another insect bite or sting:   · Do not wear bright-colored or flower-print clothing when you plan to spend time outdoors  Do not use hairspray, perfumes, or aftershave  · Do not leave food out  · Empty any standing water and wash container with soap and water every 2 days  · Put screens on all open windows and doors  · Put insect repellent that contains DEET on skin that is showing when you go outside  Put insect repellent at the top of your boots, bottom of pant legs, and sleeve cuffs  Wear long sleeves, pants, and shoes  · Use citronella candles outdoors to help keep mosquitoes away  Put a tick and flea collar on pets  Follow up with your healthcare provider as directed:  Write down your questions so you remember to ask them during your visits  © 2017 [facility] Information is for End User's use only and may not be sold, redistributed or otherwise used for commercial purposes  All illustrations and images included in CareNotes® are the copyrighted property of [employer]  or Tam Cavazos  The above information is an  only  It is not intended as medical advice for individual conditions or treatments  Talk to your doctor, nurse or pharmacist before following any medical regimen to see if it is safe and effective for you

## 2018-11-03 NOTE — PROGRESS NOTES
[facility]      NAME: Libia Caro is a 70 y o  male  : 1955    MRN: [id number]  DATE: November 3, 2018  TIME: 7:16 PM    Assessment and Plan   Tick bite of back, initial encounter [S30 860A, W57  XXXA]  1  Tick bite of back, initial encounter  TDAP Vaccine greater than or equal to 6yo       Patient Instructions     Doxycycline 200mg PO given one time  Tetanus booster given  F u with PCP if any signs or symptoms of lyme  Patient agrees with treatment      Chief Complaint     Chief Complaint   Patient presents with   Larisa Lee 81         History of Present Illness   Libia Caro presents to the clinic c/o      43-year-old male, presents for evaluation of tick bite, that he noticed today  He states it most likely attached yesterday, while he was doing gardening work  his wife tried to get the it out, but feel that the tick head might still be inside  They are not sure the last time had a tetanus shot  Denies any fevers, chills, joint pains, abdominal pain, nausea vomiting diarrhea  Review of Systems   Review of Systems   Respiratory: Negative  Cardiovascular: Negative  Skin: Positive for wound           Current Medications     Long-Term Prescriptions   Medication Sig Dispense Refill    aspirin 81 MG tablet Take 1 tablet by mouth daily      clonazePAM (KlonoPIN) 1 mg tablet Take 0 5 mg by mouth 2 (two) times a day as needed for seizures      escitalopram (LEXAPRO) 10 mg tablet Take 20 mg by mouth every morning  10    fluticasone (FLONASE) 50 mcg/act nasal spray 2 sprays into each nostril as needed        pravastatin (PRAVACHOL) 20 mg tablet Take 20 mg by mouth daily           Current Allergies     Allergies as of 2018 - Reviewed 2018   Allergen Reaction Noted    Fish oil + d3 [fish oil-cholecalciferol] Rash 2014    Oseltamivir Rash 2014            The following portions of the patient's history were reviewed and updated as appropriate: allergies, current medications, past family history, past medical history, past social history, past surgical history and problem list     HISTORICAL INFO:  Past Medical History:   Diagnosis Date    Anxiety     BPH with obstruction/lower urinary tract symptoms     LAST ASSESSED: 9/15/14    Depression     Hx of laceration of skin     LAST ASSESSED: 11/3/14    Hyperlipidemia     Long term use of drug     LAST ASSESSED: 3/8/14    Memory loss     Pneumonia     LAST ASSESSED: 5/12/14    Sleep apnea     Tachycardia     LAST ASSESSED: 3/8/14    Thrombophlebitis of superficial veins of upper extremities     LAST ASSESSED: 9/9/92    Uncomplicated alcohol abuse     LAST ASSESSED: 2/28/15    Weakness of both arms     LAST ASSESSED: 5/12/14    Weakness of both legs     LAST ASSESSED: 5/12/14     Past Surgical History:   Procedure Laterality Date    COLONOSCOPY      COMPLETE    HERNIA REPAIR         Objective   /68   Pulse 72   Temp 97 6 °F (36 4 °C) (Temporal)   Resp 18   Ht 5' 7" (1 702 m)   Wt 73 1 kg (161 lb 3 2 oz)   SpO2 97%   BMI 25 25 kg/m²        Physical Exam     Physical Exam   Constitutional: He appears well-developed and well-nourished  No distress  HENT:   Head: Normocephalic and atraumatic  Cardiovascular: Normal rate, regular rhythm and normal heart sounds  Pulmonary/Chest: Effort normal and breath sounds normal  No respiratory distress  He has no wheezes  He has no rales  Skin: Skin is warm and dry  He is not diaphoretic  Visible head of the take, on the right lower  Lateral back  there is visible erythema  Ring around the wound,   But no visible both sides lesions  Nursing note and vitals reviewed  M*Modal software was used to dictate this note  It may contain errors with dictating incorrect words/spelling  Please contact provider directly for any questions

## 2018-11-06 ENCOUNTER — TELEPHONE (OUTPATIENT)
Dept: INTERNAL MEDICINE CLINIC | Facility: CLINIC | Age: 63
End: 2018-11-06

## 2018-11-06 NOTE — TELEPHONE ENCOUNTER
PT'S WIFE STATED THAT SHE TOOK HIM TO [facility]RGENT [facility]ARE ON 11/3 DUE TO HIM HAVING A TICK BITE THAT DIDN'T LOOK RIGHT  SHE SAID THAT THEY GOT THE HEAD OF THE TICK OUT AND GAVE HIM 200MGS OF DOXYCYLINE (A ONE TIME DOSE HE TOOK WHILE THERE) AND GAVE HIM A TETANUS SHOT  BRENT IS WANTING TO KNOW IF HE NEEDS TO BE SEEN AGAIN BY EITHER YOU OR DR GUERRIER BEFORE HIS REGULAR 12/10 APPT   PLEASE ADVISE       BRENT'S CB#[phone] (OK TO LM)

## 2018-11-06 NOTE — TELEPHONE ENCOUNTER
PER , I GAVE PT AN APPT FOR TODAY AT 330PM WITH DR GUERRIER BUT HIS WIFE ASKED IF THEY COULD COME IN ON Thursday INSTEAD   PT'S APPT IS SCHEDULED

## 2018-11-08 ENCOUNTER — TELEPHONE (OUTPATIENT)
Dept: NEUROLOGY | Facility: CLINIC | Age: 63
End: 2018-11-08

## 2018-11-08 ENCOUNTER — OFFICE VISIT (OUTPATIENT)
Dept: INTERNAL MEDICINE CLINIC | Facility: CLINIC | Age: 63
End: 2018-11-08
Payer: MEDICARE
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
## 2018-11-08 DIAGNOSIS — M25.561 CHRONIC PAIN OF RIGHT KNEE: ICD-10-CM

## 2018-11-08 DIAGNOSIS — G89.29 CHRONIC PAIN OF RIGHT KNEE: ICD-10-CM

## 2018-11-08 LAB — VIT B1 BLD-SCNC: 125.5 NMOL/L (ref 66.5–200)

## 2018-11-08 PROCEDURE — 99213 OFFICE O/P EST LOW 20 MIN: CPT | Performed by: INTERNAL MEDICINE

## 2018-11-08 NOTE — TELEPHONE ENCOUNTER
----- Message from Karis Gregory MD sent at 11/8/2018  9:11 AM EST -----  Regarding: Lab results / New lab test  Please let patient know that his previous blood tests with me have been normal  He is to continue with the Neuropsych appointment when scheduled  I understand that he recently had been treated for a tick bite  I would like to add on a Lyme blood test as well, given it could potentially be related to his memory issues       Thanks    ----- Message -----  From: Lab, Background User  Sent: 11/3/2018   6:43 PM  To: Karis Gregory MD

## 2018-11-08 NOTE — TELEPHONE ENCOUNTER
Called and Left a message on pt's answering machine for a call back  Attempted both phone numbers listed

## 2018-11-08 NOTE — PROGRESS NOTES
Assessment/Plan:    #Tick Bite  -the the at the reports discovery of take it on the right abdomen on Saturday  -wife removed the body of tick however the head was lodged in  -went urgent care and had tick removal along with 200 mg doxycycline x1 as well as tetanus vaccine  -denies any arthralgias, weakness, back pain, joint pain, headache, dizziness, numbness and tingling at this time, fevers  -obtain Lyme titers next week    #Right Patella Knee Pain  -reports chronic right knee pain with kneeling for several ears  -denies any joint instability, is able to walk and run and jump without issues  -no joint laxity or instability or tenderness to palpation noted  -will refer to Physical therapy for evaluation and treatment    For comprehensive progress note please refer to 8/2/18    Addendum 12/03/2018 patient completed lab work which was unremarkable for Lyme disease  Voicemail was left for patient regarding findings  No problem-specific Assessment & Plan notes found for this encounter  Diagnoses and all orders for this visit:    Tick bite, initial encounter  -     Lyme Antibody Profile with reflex to [facility]; Future    Chronic pain of right knee  -     Ambulatory referral to Physical Therapy;  Future            Current Outpatient Prescriptions:     aspirin 81 MG tablet, Take 1 tablet by mouth daily, Disp: , Rfl:     clonazePAM (KlonoPIN) 1 mg tablet, Take 0 5 mg by mouth 2 (two) times a day as needed for seizures, Disp: , Rfl:     escitalopram (LEXAPRO) 10 mg tablet, Take 20 mg by mouth every morning, Disp: , Rfl: 10    Flaxseed, Linseed, (FLAX SEED OIL) 1300 MG CAPS, Take by mouth, Disp: , Rfl:     fluticasone (FLONASE) 50 mcg/act nasal spray, 2 sprays into each nostril as needed  , Disp: , Rfl:     Multiple Vitamin ([facility] DAILY MULTIPLE PO), Take by mouth, Disp: , Rfl:     pravastatin (PRAVACHOL) 20 mg tablet, Take 20 mg by mouth daily  , Disp: , Rfl:     Subjective:      Patient ID: Isiah Peralta jt a 70 y o  male  HPI     Patient presents as an acute visit  Reports that on Saturday he was working outside doing yard work and then his wife noticed that he had a tick on his right upper abdomen  She pulled out the body of the tick however the head remained lodged underneath the skin  Patient then went to Urgent Care where head was removed and he received 200mg doxycycline PO x1 and a tetanus vaccine  Patient is asymptomatic and states that there was initially redness surrounding the tick bite but denies that it looked like a bull's eye  Denies any fevers, chill, headache, dizziness, numbness/tingling, at this time  We will obtain lyme titers  Patient also has right knee pain that has been persistant for several years  Reports that in the past he went to PT and got better  Patient denies joint instability and is able to run, jump, and walk without issue however his knee hurts whenever he kneels  No joint laxity was noted and no tenderness to palpation was noted  We will refer to PT at this time  The following portions of the patient's history were reviewed and updated as appropriate: allergies, current medications, past family history, past medical history, past social history, past surgical history and problem list     Review of Systems   Constitutional: Negative for activity change, appetite change, fatigue and fever  HENT: Negative for congestion, ear pain, hearing loss, sore throat and tinnitus  Eyes: Negative for photophobia, pain and visual disturbance  Respiratory: Negative for cough, shortness of breath and wheezing  Cardiovascular: Negative for chest pain and leg swelling  Gastrointestinal: Negative for abdominal distention, abdominal pain, nausea and vomiting  Genitourinary: Negative for difficulty urinating, frequency and hematuria  Musculoskeletal: Positive for arthralgias (right knee pain)  Negative for back pain, joint swelling, neck pain and neck stiffness     Skin: Positive for rash (tick bit right upper abdomen)  Negative for color change, pallor and wound  Neurological: Negative for dizziness, tremors, numbness and headaches  Hematological: Does not bruise/bleed easily  Objective: There were no vitals taken for this visit  Physical Exam   Constitutional: He is oriented to person, place, and time  He appears well-developed and well-nourished  HENT:   Head: Normocephalic and atraumatic  Right Ear: External ear normal    Left Ear: External ear normal    Mouth/Throat: Oropharynx is clear and moist    Eyes: Pupils are equal, round, and reactive to light  Conjunctivae and EOM are normal    Neck: Normal range of motion  Neck supple  No JVD present  No thyromegaly present  Cardiovascular: Normal rate, regular rhythm and normal heart sounds  No murmur heard  Pulmonary/Chest: Effort normal and breath sounds normal  No respiratory distress  He has no wheezes  Abdominal: Soft  Bowel sounds are normal  He exhibits no distension  There is no tenderness  There is no rebound and no guarding  Musculoskeletal: Normal range of motion  He exhibits no edema, tenderness or deformity  Right knee pain with kneeling, pain over patella   Lymphadenopathy:     He has no cervical adenopathy  Neurological: He is alert and oriented to person, place, and time  He has normal reflexes  Skin: Skin is warm and dry  No rash noted  No erythema  Vitals reviewed

## 2018-11-09 NOTE — TELEPHONE ENCOUNTER
pt's wife made aware      he saw pcp yesterday and they ordered lyme antibody profile with reflex to [facility]

## 2018-11-12 ENCOUNTER — EVALUATION (OUTPATIENT)
Dept: PHYSICAL THERAPY | Age: 63
End: 2018-11-12
Payer: MEDICARE

## 2018-11-12 DIAGNOSIS — M17.11 PRIMARY OSTEOARTHRITIS OF RIGHT KNEE: ICD-10-CM

## 2018-11-12 DIAGNOSIS — G89.29 CHRONIC PAIN OF RIGHT KNEE: Primary | ICD-10-CM

## 2018-11-12 DIAGNOSIS — M25.561 CHRONIC PAIN OF RIGHT KNEE: Primary | ICD-10-CM

## 2018-11-12 PROCEDURE — 97162 PT EVAL MOD COMPLEX 30 MIN: CPT | Performed by: PHYSICAL THERAPIST

## 2018-11-12 PROCEDURE — 97110 THERAPEUTIC EXERCISES: CPT | Performed by: PHYSICAL THERAPIST

## 2018-11-12 PROCEDURE — G8990 OTHER PT/OT CURRENT STATUS: HCPCS | Performed by: PHYSICAL THERAPIST

## 2018-11-12 PROCEDURE — G8991 OTHER PT/OT GOAL STATUS: HCPCS | Performed by: PHYSICAL THERAPIST

## 2018-11-12 NOTE — PROGRESS NOTES
PT Evaluation     Today's date: 2018  Patient name: Boston Turner  : 1955  MRN: [id number]  Referring provider: Kristie Rahman DO  Dx:   Encounter Diagnosis     ICD-10-CM    1  Chronic pain of right knee M25 561 Ambulatory referral to Physical Therapy    G89 29    2  Primary osteoarthritis of right knee M17 11        Start Time: 1545  Stop Time: 1630  Total time in clinic (min): 45 minutes    Assessment  Impairments: abnormal or restricted ROM, abnormal movement, impaired physical strength and pain with function    Assessment details: Boston Turner is a 70 y o  male who presents with signs and symptoms consistent of right knee OA  Patient presents with pain, decreased strength and decreased ROM  Due to these impairments, Patient has difficulty performing a/iadls and recreational activities  During functional examination patient had pain when bending over to lift 20 pound box from floor and is unable to reach full active end ROM without pain  Pt has mild cognitive deficits making subjective assessment challenging  Patient would benefit from skilled physical therapy to address the impairments, improve their level of function, and to improve their overall quality of life  Understanding of Dx/Px/POC: good   Prognosis: good    Goals  Short Term Goals: to be achieved by 4 weeks  1) Patient to be independent with basic HEP  2) Decrease pain to 1/10 at its worst   3) Increase Knee flexion ROM by 5 degrees   4) Increase LE strength by 1/2 MMT grade in all deficient planes  Long Term Goals: to be achieved by discharge  1) FOTO equal to or greater than 72    2) Ambulation to improve to maximal level of function  3) Stair negotiation will improve to reciprocal   4) Sit to stand transfers will improve to maximal level of function     Plan  Planned modality interventions: cryotherapy  Other planned modality interventions: moist heat  Planned therapy interventions: abdominal trunk stabilization, strengthening, therapeutic exercise, home exercise program, stretching, therapeutic activities, balance, transfer training, neuromuscular re-education and patient education  Frequency: Twice a week for six weeks  Treatment plan discussed with: patient        Subjective Evaluation    History of Present Illness  Mechanism of injury: Pt reports months of right knee pain that is exacerbated with high level activities such as lifting firewood and getting out of bed of truck  Pt reports no falls or traumatic injury to knee  Pt has PMH of cognitive deficits   Pain  Current pain ratin  At best pain ratin  At worst pain ratin  Quality: dull ache (knee cap)  Aggravating factors: nothing  Progression: worsening    Patient Goals  Patient goals for therapy: decreased pain, increased motion and return to sport/leisure activities          Objective     Active Range of Motion     Lumbar   Normal active range of motion  Left Knee   Flexion: 130 degrees     Right Knee   Flexion: 130 degrees with pain  Extension: [facility]    Additional Active Range of Motion Details  Pt has pain at end range AROM/PROM    Strength/Myotome Testing     Lumbar   Left   Heel walk: normal  Toe walk: normal    Right   Heel walk: normal  Toe walk: normal    Left Hip   Planes of Motion   Flexion: 4+  Extension: 4+  Abduction: 4+  Adduction: 4+  External rotation: 4+  Internal rotation: 4+    Right Hip   Planes of Motion   Flexion: 4+  Extension: 4+  Abduction: 4+  Adduction: 4+  External rotation: 4+  Internal rotation: 4+    Left Knee   Flexion: 4+  Extension: 4+    Right Knee   Flexion: 4+  Extension: 4+    Functional Assessment   Squat   Pain and right tibial anterior translation beyond toes  Comments  Pt has pain at 110 degrees of knee flexion during squat         Flowsheet Rows      Most Recent Value   PT/OT G-Codes   Current Score  69   Projected Score  72   Assessment Type  Evaluation   G code set  Other PT/OT Primary   Other PT Primary Current Status ()  CJ   Other PT Primary Goal Status ()  CJ          Precautions: Confusion    Daily Treatment Diary     Manual                                                                                   Exercise Diary              SLR HEP            LAQ HEP            Bridging nv            Mini squats HEP            Step-ups nv            TKE nv            Wall squats nv            Hamstring stretch nv            Heel slides HEP            Bike nv                                                                                                                                                  Modalities

## 2018-11-14 ENCOUNTER — OFFICE VISIT (OUTPATIENT)
Dept: PHYSICAL THERAPY | Age: 63
End: 2018-11-14
Payer: MEDICARE

## 2018-11-14 DIAGNOSIS — G89.29 CHRONIC PAIN OF RIGHT KNEE: Primary | ICD-10-CM

## 2018-11-14 DIAGNOSIS — M25.561 CHRONIC PAIN OF RIGHT KNEE: Primary | ICD-10-CM

## 2018-11-14 PROCEDURE — 97110 THERAPEUTIC EXERCISES: CPT | Performed by: PHYSICAL THERAPIST

## 2018-11-14 PROCEDURE — 97140 MANUAL THERAPY 1/> REGIONS: CPT | Performed by: PHYSICAL THERAPIST

## 2018-11-14 PROCEDURE — 97112 NEUROMUSCULAR REEDUCATION: CPT | Performed by: PHYSICAL THERAPIST

## 2018-11-14 NOTE — PROGRESS NOTES
Daily Note     Today's date: 2018  Patient name: Jagjit Escamilla  : 1955  MRN: [id number]  Referring provider: Rosemary Gonzalez DO  Dx:   Encounter Diagnosis     ICD-10-CM    1  Chronic pain of right knee M25 561     G89 29        Start Time: 1145  Stop Time: 1230  Total time in clinic (min): 45 minutes    Subjective: Patient denies pain with certain activities such as jumping off the truck but can experience pain randomly throughout the day  Objective: See treatment diary below      Assessment: Tolerated treatment well  Patient requires constant verbal and tactile cues for correct performance of exercise  Denies pain during exercise progression  Plan: Continue per plan of care       Precautions: Confusion    Daily Treatment Diary     Manual              Tibial-femoral joint mobilizations in flexion and extension 8 min gr III+IV                                                                    Exercise Diary              SLR HEP            LAQ HEP 3x10 3#           Bridging nv 2x10           Mini squats HEP            Step-ups nv            TKE nv            Wall squats nv            Hamstring stretch nv 4x30"           Heel slides HEP            Bike nv 10 min           Hamstring curls  gtb 3x10                                                                                                                                    Modalities                                                       1:1 with PT from 1145-1230pm

## 2018-11-15 ENCOUNTER — APPOINTMENT (OUTPATIENT)
Dept: PHYSICAL THERAPY | Age: 63
End: 2018-11-15
Payer: MEDICARE

## 2018-11-19 ENCOUNTER — OFFICE VISIT (OUTPATIENT)
Dept: PHYSICAL THERAPY | Age: 63
End: 2018-11-19
Payer: MEDICARE

## 2018-11-19 DIAGNOSIS — G89.29 CHRONIC PAIN OF RIGHT KNEE: Primary | ICD-10-CM

## 2018-11-19 DIAGNOSIS — M25.561 CHRONIC PAIN OF RIGHT KNEE: Primary | ICD-10-CM

## 2018-11-19 DIAGNOSIS — M17.11 PRIMARY OSTEOARTHRITIS OF RIGHT KNEE: ICD-10-CM

## 2018-11-19 PROCEDURE — 97112 NEUROMUSCULAR REEDUCATION: CPT | Performed by: PHYSICAL THERAPIST

## 2018-11-19 PROCEDURE — 97110 THERAPEUTIC EXERCISES: CPT | Performed by: PHYSICAL THERAPIST

## 2018-11-19 PROCEDURE — 97140 MANUAL THERAPY 1/> REGIONS: CPT | Performed by: PHYSICAL THERAPIST

## 2018-11-19 NOTE — PROGRESS NOTES
Daily Note     Today's date: 2018  Patient name: Colin Sethi  : 1955  MRN: [id number]  Referring provider: Flo Berman DO  Dx:   Encounter Diagnosis     ICD-10-CM    1  Chronic pain of right knee M25 561     G89 29    2  Primary osteoarthritis of right knee M17 11        Start Time: 0915  Stop Time: 1000  Total time in clinic (min): 45 minutes    Subjective: Pt reports no new symptoms  Objective: See treatment diary below      Assessment: Tolerated treatment well  Pt's POC was advanced to include more closed chain exercises to increase LE strength in functional positions  Pt responded well but requires increased verbal cues to perform exercises without compensation  Patient demonstrated fatigue post treatment and would benefit from continued PT      Plan: Continue per plan of care       Daily Treatment Diary     Manual              Tibial-femoral joint mobilizations in flexion and extension 8 min gr III+IV                                                                    Exercise Diary             SLR HEP  3*10 b/l          LAQ HEP 3x10 3# 3*10 3#          Bridging nv 2x10 2*10          Mini squats HEP  2*10          Step-ups nv  3*10 6"          TKE nv  2*10 20#          Wall squats nv  2*10 nv          Hamstring stretch nv 4x30" 3*30"          Heel slides HEP  nv          Bike nv 10 min 5 mins          Hamstring curls  gtb 3x10           Piriformis stretch   10*10                                                                                                                      Modalities                                                       1:1 with PT from 1145-1230pm

## 2018-11-21 ENCOUNTER — OFFICE VISIT (OUTPATIENT)
Dept: PHYSICAL THERAPY | Age: 63
End: 2018-11-21
Payer: MEDICARE

## 2018-11-21 DIAGNOSIS — M17.11 PRIMARY OSTEOARTHRITIS OF RIGHT KNEE: ICD-10-CM

## 2018-11-21 DIAGNOSIS — M25.561 CHRONIC PAIN OF RIGHT KNEE: Primary | ICD-10-CM

## 2018-11-21 DIAGNOSIS — G89.29 CHRONIC PAIN OF RIGHT KNEE: Primary | ICD-10-CM

## 2018-11-21 PROCEDURE — 97110 THERAPEUTIC EXERCISES: CPT | Performed by: PHYSICAL THERAPIST

## 2018-11-21 PROCEDURE — 97112 NEUROMUSCULAR REEDUCATION: CPT | Performed by: PHYSICAL THERAPIST

## 2018-11-26 ENCOUNTER — OFFICE VISIT (OUTPATIENT)
Dept: PHYSICAL THERAPY | Age: 63
End: 2018-11-26
Payer: MEDICARE

## 2018-11-26 DIAGNOSIS — M25.561 CHRONIC PAIN OF RIGHT KNEE: Primary | ICD-10-CM

## 2018-11-26 DIAGNOSIS — M17.11 PRIMARY OSTEOARTHRITIS OF RIGHT KNEE: ICD-10-CM

## 2018-11-26 DIAGNOSIS — G89.29 CHRONIC PAIN OF RIGHT KNEE: Primary | ICD-10-CM

## 2018-11-26 PROCEDURE — 97112 NEUROMUSCULAR REEDUCATION: CPT

## 2018-11-26 PROCEDURE — G8990 OTHER PT/OT CURRENT STATUS: HCPCS

## 2018-11-26 PROCEDURE — 97110 THERAPEUTIC EXERCISES: CPT

## 2018-11-26 PROCEDURE — G8991 OTHER PT/OT GOAL STATUS: HCPCS

## 2018-11-26 NOTE — PROGRESS NOTES
Daily Note     Today's date: 2018  Patient name: Libia Caro  : 1955  MRN: [id number]  Referring provider: Marcela George DO  Dx:   Encounter Diagnosis     ICD-10-CM    1  Chronic pain of right knee M25 561     G89 29    2  Primary osteoarthritis of right knee M17 11                   Subjective:  Pt reports he's feeling better with less pain during fx activities; PAS 0-3/10 past few days    Objective: See treatment diary below  Strength R hip flex 5/5, hip ext 4+/5, hip abd 4+/5, hip add 5/5                 R knee flex 4+/5, knee ext 4+/5  AROM R knee 0-135 degrees    Assessment: Tolerated treatment well  Patient would benefit from continued PT to maximize function and decrease pain  Pt needed cueing throughout for ex recall/tehnique due to impaired mentation      Plan: Continue per plan of care  Progress treatment as tolerated        Daily Treatment Diary     Manual              Tibial-femoral joint mobilizations in flexion and extension 8 min gr III+IV                                                                    Exercise Diary   18        SLR HEP  3*10 b/l 3*10 b/l 3x10 b/l        LAQ HEP 3x10 3# 3*10 3# 310 3# 3# 3x10        Bridging nv 2x10 2*10 2*10 2x10x5"        Mini squats HEP  2*10          Step-ups nv  3*10 6" 3*10 12"         TKE nv  2*10 20#  20# 2x15x5"        Wall squats nv  2*10  2*10 2x10        Hamstring stretch nv 4x30" 3*30" 3*30" 3x30"        Heel slides HEP  nv          Bike nv 10 min 5 mins 5 min 10 min        Hamstring curls  gtb 3x10   gtb 2x15        Piriformis stretch   10*10  10x10"        Leg press    2*10 110 lbs 110# 2x10x5"        Monster walks    3 laps gtb gtb 2x20ft                                                                                          Modalities

## 2018-11-29 ENCOUNTER — OFFICE VISIT (OUTPATIENT)
Dept: PHYSICAL THERAPY | Age: 63
End: 2018-11-29
Payer: MEDICARE

## 2018-11-29 DIAGNOSIS — M25.561 CHRONIC PAIN OF RIGHT KNEE: Primary | ICD-10-CM

## 2018-11-29 DIAGNOSIS — G89.29 CHRONIC PAIN OF RIGHT KNEE: Primary | ICD-10-CM

## 2018-11-29 DIAGNOSIS — M17.11 PRIMARY OSTEOARTHRITIS OF RIGHT KNEE: ICD-10-CM

## 2018-11-29 PROCEDURE — 97110 THERAPEUTIC EXERCISES: CPT | Performed by: PHYSICAL THERAPIST

## 2018-11-29 PROCEDURE — 97112 NEUROMUSCULAR REEDUCATION: CPT | Performed by: PHYSICAL THERAPIST

## 2018-11-29 NOTE — PROGRESS NOTES
Daily Note     Today's date: 2018  Patient name: Kyrie Thomson  : 1955  MRN: [id number]  Referring provider: Jet Agustin DO  Dx:   Encounter Diagnosis     ICD-10-CM    1  Chronic pain of right knee M25 561     G89 29    2  Primary osteoarthritis of right knee M17 11        Start Time: 1100  Stop Time: 1145  Total time in clinic (min): 45 minutes    Subjective: No new complaints today  "Sometimes it's bother some, other times it not " Patient notes the ability to jump off the back of a truck  Objective: See treatment diary below      Assessment: Patient requires constant verbal and tactile cues for correct performance of exercise  No complaints of pain during exercise  Anticipate discharge after next treatment  Plan: 1 more session for HEP progression, than discharge       Daily Treatment Diary     Manual               Tibial-femoral joint mobilizations in flexion and extension 8 min gr III+IV                                                                    Exercise Diary   18       SLR HEP  3*10 b/l 3*10 b/l 3x10 b/l 3x10 b/l        LAQ HEP 3x10 3# 3*10 3# 310 3# 3# 3x10 3# 3x10       Bridging nv 2x10 2*10 2*10 2x10x5" 2x10       Mini squats HEP  2*10   3x10       Step-ups nv  3*10 6" 3*10 12"         TKE nv  2*10 20#  20# 2x15x5"        Wall squats nv  2*10  2*10 2x10        Hamstring stretch nv 4x30" 3*30" 3*30" 3x30" 3x30"       Heel slides HEP  nv          Bike nv 10 min 5 mins 5 min 10 min 10 min       Hamstring curls  gtb 3x10   gtb 2x15        Piriformis stretch   10*10  10x10" 10x10"        Leg press    2*10 110 lbs 110# 2x10x5" 110# 3x10        Monster walks    3 laps gtb gtb 2x20ft                                                                                          Modalities                                                       1:1 with PT from 1120-1145am

## 2018-12-01 ENCOUNTER — APPOINTMENT (OUTPATIENT)
Dept: LAB | Facility: MEDICAL CENTER | Age: 63
End: 2018-12-01
Payer: MEDICARE

## 2018-12-01 DIAGNOSIS — E78.01 FAMILIAL HYPERCHOLESTEROLEMIA: Chronic | ICD-10-CM
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
## 2018-12-01 DIAGNOSIS — R41.3 MEMORY LOSS: ICD-10-CM

## 2018-12-01 LAB
BASOPHILS # BLD AUTO: 0.05 THOUSANDS/ΜL (ref 0–0.1)
BASOPHILS NFR BLD AUTO: 1 % (ref 0–1)
EOSINOPHIL # BLD AUTO: 0.25 THOUSAND/ΜL (ref 0–0.61)
EOSINOPHIL NFR BLD AUTO: 6 % (ref 0–6)
ERYTHROCYTE [DISTWIDTH] IN BLOOD BY AUTOMATED COUNT: 12.7 % (ref 11.6–15.1)
HCT VFR BLD AUTO: 45.1 % (ref 36.5–49.3)
HGB BLD-MCNC: 14.8 G/DL (ref 12–17)
IMM GRANULOCYTES # BLD AUTO: 0.01 THOUSAND/UL (ref 0–0.2)
IMM GRANULOCYTES NFR BLD AUTO: 0 % (ref 0–2)
LYMPHOCYTES # BLD AUTO: 1.28 THOUSANDS/ΜL (ref 0.6–4.47)
LYMPHOCYTES NFR BLD AUTO: 29 % (ref 14–44)
MCH RBC QN AUTO: 29.8 PG (ref 26.8–34.3)
MCHC RBC AUTO-ENTMCNC: 32.8 G/DL (ref 31.4–37.4)
MCV RBC AUTO: 91 FL (ref 82–98)
MONOCYTES # BLD AUTO: 0.49 THOUSAND/ΜL (ref 0.17–1.22)
MONOCYTES NFR BLD AUTO: 11 % (ref 4–12)
NEUTROPHILS # BLD AUTO: 2.41 THOUSANDS/ΜL (ref 1.85–7.62)
NEUTS SEG NFR BLD AUTO: 53 % (ref 43–75)
NRBC BLD AUTO-RTO: 0 /100 WBCS
PLATELET # BLD AUTO: 215 THOUSANDS/UL (ref 149–390)
PMV BLD AUTO: 10.6 FL (ref 8.9–12.7)
RBC # BLD AUTO: 4.97 MILLION/UL (ref 3.88–5.62)
WBC # BLD AUTO: 4.49 THOUSAND/UL (ref 4.31–10.16)

## 2018-12-01 PROCEDURE — 80061 LIPID PANEL: CPT

## 2018-12-01 PROCEDURE — 83721 ASSAY OF BLOOD LIPOPROTEIN: CPT

## 2018-12-01 PROCEDURE — 86618 LYME DISEASE ANTIBODY: CPT

## 2018-12-01 PROCEDURE — 85025 COMPLETE CBC W/AUTO DIFF WBC: CPT

## 2018-12-01 PROCEDURE — 80053 COMPREHEN METABOLIC PANEL: CPT

## 2018-12-01 PROCEDURE — 36415 COLL VENOUS BLD VENIPUNCTURE: CPT

## 2018-12-02 LAB
ALBUMIN SERPL BCP-MCNC: 3.8 G/DL (ref 3.5–5)
ALP SERPL-CCNC: 83 U/L (ref 46–116)
ALT SERPL W P-5'-P-CCNC: 45 U/L (ref 12–78)
ANION GAP SERPL CALCULATED.3IONS-SCNC: 0 MMOL/L (ref 4–13)
AST SERPL W P-5'-P-CCNC: 26 U/L (ref 5–45)
BILIRUB SERPL-MCNC: 0.46 MG/DL (ref 0.2–1)
BUN SERPL-MCNC: 16 MG/DL (ref 5–25)
CALCIUM SERPL-MCNC: 9.4 MG/DL (ref 8.3–10.1)
CHLORIDE SERPL-SCNC: 103 MMOL/L (ref 100–108)
CHOLEST SERPL-MCNC: 178 MG/DL (ref 50–200)
CO2 SERPL-SCNC: 31 MMOL/L (ref 21–32)
CREAT SERPL-MCNC: 0.97 MG/DL (ref 0.6–1.3)
GFR SERPL CREATININE-BSD FRML MDRD: 82 ML/MIN/1.73SQ M
GLUCOSE P FAST SERPL-MCNC: 86 MG/DL (ref 65–99)
HDLC SERPL-MCNC: 61 MG/DL (ref 40–60)
LDLC SERPL DIRECT ASSAY-MCNC: 98 MG/DL (ref 0–100)
POTASSIUM SERPL-SCNC: 4.2 MMOL/L (ref 3.5–5.3)
PROT SERPL-MCNC: 7.2 G/DL (ref 6.4–8.2)
SODIUM SERPL-SCNC: 134 MMOL/L (ref 136–145)
TRIGL SERPL-MCNC: 78 MG/DL

## 2018-12-03 ENCOUNTER — TELEPHONE (OUTPATIENT)
Dept: UROLOGY | Facility: MEDICAL CENTER | Age: 63
End: 2018-12-03

## 2018-12-03 ENCOUNTER — TRANSCRIBE ORDERS (OUTPATIENT)
Dept: ADMINISTRATIVE | Facility: HOSPITAL | Age: 63
End: 2018-12-03

## 2018-12-03 DIAGNOSIS — K40.00 NON-RECURRENT BILATERAL INGUINAL HERNIA WITH OBSTRUCTION WITHOUT GANGRENE: Primary | ICD-10-CM

## 2018-12-03 LAB
B BURGDOR IGG SER IA-ACNC: 0.24
B BURGDOR IGM SER IA-ACNC: 0.49

## 2018-12-03 NOTE — TELEPHONE ENCOUNTER
Reason for appointment/Complaint/Diagnosis : ZONIA    Insurance: Medicare     History of Cancer? MIGUELANGEL                       If yes, what kind? Melanoma - (resolved)    Previous urologist?     Dr Ramirez                  Records requested/where? In [facility]    Outside testing/where? N/A    Location Preference for office visit?  [facility]

## 2018-12-04 ENCOUNTER — OFFICE VISIT (OUTPATIENT)
Dept: PHYSICAL THERAPY | Age: 63
End: 2018-12-04
Payer: MEDICARE

## 2018-12-04 DIAGNOSIS — M17.11 PRIMARY OSTEOARTHRITIS OF RIGHT KNEE: ICD-10-CM

## 2018-12-04 DIAGNOSIS — M25.561 CHRONIC PAIN OF RIGHT KNEE: Primary | ICD-10-CM

## 2018-12-04 DIAGNOSIS — G89.29 CHRONIC PAIN OF RIGHT KNEE: Primary | ICD-10-CM

## 2018-12-04 PROCEDURE — 97112 NEUROMUSCULAR REEDUCATION: CPT | Performed by: PHYSICAL THERAPIST

## 2018-12-04 PROCEDURE — G8991 OTHER PT/OT GOAL STATUS: HCPCS | Performed by: PHYSICAL THERAPIST

## 2018-12-04 PROCEDURE — G8992 OTHER PT/OT  D/C STATUS: HCPCS | Performed by: PHYSICAL THERAPIST

## 2018-12-04 PROCEDURE — 97110 THERAPEUTIC EXERCISES: CPT | Performed by: PHYSICAL THERAPIST

## 2018-12-04 PROCEDURE — 97530 THERAPEUTIC ACTIVITIES: CPT | Performed by: PHYSICAL THERAPIST

## 2018-12-04 NOTE — PROGRESS NOTES
PT Discharge    Today's date: 2018  Patient name: Abisai Tierney  : 1955  MRN: [id number]  Referring provider: Emili Sheehan DO  Dx:   Encounter Diagnosis     ICD-10-CM    1  Chronic pain of right knee M25 561     G89 29    2  Primary osteoarthritis of right knee M17 11        Start Time: 1015  Stop Time: 1115  Total time in clinic (min): 60 minutes    Assessment  Assessment details: Abisai Tierney is a 70 y o  male who presents with signs and symptoms consistent of right knee OA  Patient has seen improvements with pain, strength and  ROM  Due to these improvements, Patient has no difficulty performing a/iadls and recreational activities  Pt is able to lift without pain and run without any pain  Pt improvements are seen in FOTO score of 99 and has met all functional goals  Pt will be discharged with HEP to maintain gains with therapy  Understanding of Dx/Px/POC: good   Prognosis: good    Goals  Short Term Goals: to be achieved by 4 weeks  1) Patient to be independent with basic HEP  MET  2) Decrease pain to 1/10 at its worst  MET  3) Increase Knee flexion ROM by 5 degrees MET  4) Increase LE strength by 1/2 MMT grade in all deficient planes  MET    Long Term Goals: to be achieved by discharge  1) FOTO equal to or greater than 72  MET  2) Ambulation to improve to maximal level of function MET  3) Stair negotiation will improve to reciprocal  MET  4) Sit to stand transfers will improve to maximal level of function MET    Plan  Planned modality interventions: cryotherapy  Other planned modality interventions: moist heat  Planned therapy interventions: home exercise program  Frequency: d/c PT  Treatment plan discussed with: patient        Subjective Evaluation    History of Present Illness  Mechanism of injury: IE: Pt reports months of right knee pain that is exacerbated with high level activities such as lifting firewood and getting out of bed of truck  Pt reports no falls or traumatic injury to knee   Pt has PMH of cognitive deficits     D/C: Pt has less bouts of pain with high level activity and the pain is less intense than initial evaluation  Pt reports no issues during functional activities  Pain  Current pain ratin  At best pain ratin  At worst pain ratin  Quality: knee cap  Aggravating factors: nothing  Progression: resolved    Patient Goals  Patient goals for therapy: decreased pain, return to sport/leisure activities and increased motion          Objective     Active Range of Motion     Lumbar   Normal active range of motion  Left Knee   Flexion: 135 degrees     Right Knee   Flexion: 135 degrees     Additional Active Range of Motion Details  Pt has pain at end range AROM/PROM    Strength/Myotome Testing     Lumbar   Left   Heel walk: normal  Toe walk: normal    Right   Heel walk: normal  Toe walk: normal    Left Hip   Planes of Motion   Flexion: 5  Extension: 5  Abduction: 5  Adduction: 5  External rotation: 5  Internal rotation: 5    Right Hip   Planes of Motion   Flexion: 5  Extension: 5  Abduction: 5  Adduction: 5  External rotation: 5  Internal rotation: 5    Left Knee   Flexion: 5  Extension: 5    Right Knee   Flexion: 5  Extension: 5    Functional Assessment   Squat   Pain and right tibial anterior translation beyond toes  Comments  Pt has pain at 110 degrees of knee flexion during squat         Flowsheet Rows      Most Recent Value   PT/OT G-Codes   Current Score  99   Projected Score  72   Assessment Type  Discharge   G code set  Other PT/OT Primary   Other PT Primary Goal Status ()  CJ   Other PT Primary Discharge Status ()  CI          Precautions: Confusion    Daily Treatment Diary     Manual               Tibial-femoral joint mobilizations in flexion and extension 8 min gr III+IV                                                                    Exercise Diary   11/14 11/19 11/21 11/26/18 12/3       SLR HEP  3*10 b/l 3*10 b/l 3x10 b/l 3x10 b/l        LAQ HEP 3x10 3# 3*10 3# 310 3# 3# 3x10        Bridging nv 2x10 2*10 2*10 2x10x5" 2x10       Mini squats HEP  2*10   3x10       Step-ups nv  3*10 6" 3*10 12"  3*10 12"       TKE nv  2*10 20#  20# 2x15x5"        Wall squats nv  2*10  2*10 2x10        Hamstring stretch nv 4x30" 3*30" 3*30" 3x30"        Heel slides HEP  nv          Bike nv 10 min 5 mins 5 min 10 min 5 min       Hamstring curls  gtb 3x10   gtb 2x15        Piriformis stretch   10*10  10x10"        Leg press    2*10 110 lbs 110# 2x10x5"        Monster walks    3 laps gtb gtb 2x20ft gtb 2*20 ft       Lunges      2*20ft       SLS       3*30" b/l                                                               Modalities

## 2018-12-04 NOTE — TELEPHONE ENCOUNTER
Appointment scheduled for 01/02/19 at 1:45PM in [facility] paperwork mailed

## 2018-12-10 ENCOUNTER — OFFICE VISIT (OUTPATIENT)
Dept: INTERNAL MEDICINE CLINIC | Facility: CLINIC | Age: 63
End: 2018-12-10
Payer: MEDICARE

## 2018-12-10 VITALS
RESPIRATION RATE: 17 BRPM | SYSTOLIC BLOOD PRESSURE: 114 MMHG | BODY MASS INDEX: 25.74 KG/M2 | WEIGHT: 164 LBS | HEIGHT: 67 IN | OXYGEN SATURATION: 98 % | HEART RATE: 76 BPM | DIASTOLIC BLOOD PRESSURE: 78 MMHG

## 2018-12-10 DIAGNOSIS — E78.5 HYPERLIPIDEMIA, UNSPECIFIED HYPERLIPIDEMIA TYPE: ICD-10-CM

## 2018-12-10 DIAGNOSIS — N40.0 BENIGN PROSTATIC HYPERPLASIA, UNSPECIFIED WHETHER LOWER URINARY TRACT SYMPTOMS PRESENT: Chronic | ICD-10-CM

## 2018-12-10 DIAGNOSIS — R41.3 MEMORY LOSS: Chronic | ICD-10-CM

## 2018-12-10 DIAGNOSIS — E55.9 VITAMIN D DEFICIENCY: Chronic | ICD-10-CM

## 2018-12-10 DIAGNOSIS — H90.2 CONDUCTIVE HEARING LOSS, UNSPECIFIED LATERALITY: Primary | ICD-10-CM

## 2018-12-10 DIAGNOSIS — E78.01 FAMILIAL HYPERCHOLESTEROLEMIA: Chronic | ICD-10-CM

## 2018-12-10 DIAGNOSIS — C43.9 MALIGNANT MELANOMA OF SKIN (HCC): Chronic | ICD-10-CM

## 2018-12-10 DIAGNOSIS — Z23 NEED FOR VACCINATION AGAINST STREPTOCOCCUS PNEUMONIAE USING PNEUMOCOCCAL CONJUGATE VACCINE 13: ICD-10-CM

## 2018-12-10 DIAGNOSIS — R79.9 ABNORMAL FINDING OF BLOOD CHEMISTRY: ICD-10-CM

## 2018-12-10 DIAGNOSIS — M53.3 SACRAL BACK PAIN: ICD-10-CM

## 2018-12-10 DIAGNOSIS — M25.561 KNEE PAIN, RIGHT ANTERIOR: ICD-10-CM

## 2018-12-10 DIAGNOSIS — G47.30 SLEEP APNEA, UNSPECIFIED TYPE: ICD-10-CM

## 2018-12-10 PROCEDURE — 99214 OFFICE O/P EST MOD 30 MIN: CPT | Performed by: INTERNAL MEDICINE

## 2018-12-10 PROCEDURE — G0009 ADMIN PNEUMOCOCCAL VACCINE: HCPCS | Performed by: INTERNAL MEDICINE

## 2018-12-10 PROCEDURE — 90670 PCV13 VACCINE IM: CPT | Performed by: INTERNAL MEDICINE

## 2018-12-10 RX ORDER — MELOXICAM 15 MG/1
15 TABLET ORAL DAILY
Qty: 30 TABLET | Refills: 0 | Status: SHIPPED | OUTPATIENT
Start: 2018-12-10 | End: 2019-01-03 | Stop reason: SDUPTHER

## 2018-12-10 RX ORDER — PRAVASTATIN SODIUM 20 MG
20 TABLET ORAL DAILY
Qty: 90 TABLET | Refills: 0
Start: 2018-12-10 | End: 2019-02-18 | Stop reason: SDUPTHER

## 2018-12-10 NOTE — PROGRESS NOTES
Assessment/Plan:    # memory loss  -patient's most recent mini-mental status exam from August was 26/30  -patient completed MRI scan revealing no abnormalities  -currently seeing neuropsychiatry at this time who thinks that pseudodementia may be anunderlining manifestation from depression    # anxiety and depression  -patient denies any depression or anxiety type symptoms however will see Psychiatry of February for formal evaluation    # bilateral lower groin pain  -previous history of hernia surgery  -will obtain ultrasound imaging as ordered by General surgery    # hyperlipidemia  -LDL 98 in HDL 61 currently on pravastatin as well as Zetia  -patient reports that Zetia is too expensive and we will DC that    # previous history of pneumonia  -previous pneumonia superimposed from influenza however has currently resolved    # previous history of lumbar disc herniation  -currently reports lumbar and sacral back pain  -patient deferring any imaging at this time  -will treat with meloxicam for relief  -patient will call back within 1 week if symptoms do not improve so that he may obtain imaging as well as orthopedic evaluation  -will start on meloxicam for relief    # obstructive sleep apnea  -currently tolerating CPAP without any issues    # right knee pain  -completed physical therapy with relief in pain  -encouraged patient to continue with the exercises that were taught to him    # constipation  -reports intermittent constipation with difficulty with bowel movements as well as gas and bloating sensation  -advised patient to start on prune juice and if symptoms do not improve that we will consider medication supplementation    #lyme disease  -found with a tick on right upper quadrant abdominal  -Lyme titers were negative however completed prophylactic doxycycline therapy and currently patient has no neurologic issues    # health maintenance  -routine labs and follow-up in 6 months  -colonoscopy completed in 2014 with repeat in 2019  -shingles vaccine script given to patient  -flu vaccine administered 2018  -Prevnar 13 administered today  -Pneumovax 23 given to patient 2014    Addendum 03/14/2019 patient's wife called reforming us that patient's psychiatrist has started him on citalopram and slowly weaned off of the Lexapro  Patient reports that he has muscle spasms after going for a walk above his right knee  Patient's wife was informed that she can try lowering his dose of the Celexa to see if this will help  She states that she would like to continue with therapy however would like to increase his free water intake and have him see Psychiatry regarding the dose  We informed her that if this does not help then we will likely need an EMG study  Addendum 04/04/2019 patient was seen by neurology for mild cognitive impairment and suggested to continue follow up with Psychiatry for management of depression  Addendum 06/18/2019 patient completed routine labs revealing A1c 5 2, HDL 68, , TSH 1 82, vitamin-D 34 3, patient has a follow-up appointment today and we will review the results with him at that time    No problem-specific Assessment & Plan notes found for this encounter  Diagnoses and all orders for this visit:    Conductive hearing loss, unspecified laterality  -     CBC and differential; Future  -     Lipid Panel with Direct LDL reflex; Future  -     Vitamin D 25 hydroxy; Future  -     Hemoglobin A1C; Future  -     TSH, 3rd generation with Free T4 reflex; Future  -     PSA, total and free; Future  -     Comprehensive metabolic panel; Future    Malignant melanoma of skin (HCC)  -     CBC and differential; Future  -     Lipid Panel with Direct LDL reflex; Future  -     Vitamin D 25 hydroxy; Future  -     Hemoglobin A1C; Future  -     TSH, 3rd generation with Free T4 reflex; Future  -     PSA, total and free; Future  -     Comprehensive metabolic panel;  Future    Benign prostatic hyperplasia, unspecified whether lower urinary tract symptoms present  -     CBC and differential; Future  -     Lipid Panel with Direct LDL reflex; Future  -     Vitamin D 25 hydroxy; Future  -     Hemoglobin A1C; Future  -     TSH, 3rd generation with Free T4 reflex; Future  -     PSA, total and free; Future  -     Comprehensive metabolic panel; Future    Familial hypercholesterolemia  -     CBC and differential; Future  -     Lipid Panel with Direct LDL reflex; Future  -     Vitamin D 25 hydroxy; Future  -     Hemoglobin A1C; Future  -     TSH, 3rd generation with Free T4 reflex; Future  -     PSA, total and free; Future  -     Comprehensive metabolic panel; Future  -     pravastatin (PRAVACHOL) 20 mg tablet; Take 1 tablet (20 mg total) by mouth daily for 90 days    Memory loss  -     CBC and differential; Future  -     Lipid Panel with Direct LDL reflex; Future  -     Vitamin D 25 hydroxy; Future  -     Hemoglobin A1C; Future  -     TSH, 3rd generation with Free T4 reflex; Future  -     PSA, total and free; Future  -     Comprehensive metabolic panel; Future    Vitamin D deficiency  -     CBC and differential; Future  -     Lipid Panel with Direct LDL reflex; Future  -     Vitamin D 25 hydroxy; Future  -     Hemoglobin A1C; Future  -     TSH, 3rd generation with Free T4 reflex; Future  -     PSA, total and free; Future  -     Comprehensive metabolic panel; Future    Knee pain, right anterior  -     CBC and differential; Future  -     Lipid Panel with Direct LDL reflex; Future  -     Vitamin D 25 hydroxy; Future  -     Hemoglobin A1C; Future  -     TSH, 3rd generation with Free T4 reflex; Future  -     PSA, total and free; Future  -     Comprehensive metabolic panel; Future    Sacral back pain  -     meloxicam (MOBIC) 15 mg tablet; Take 1 tablet (15 mg total) by mouth daily for 30 days  -     CBC and differential; Future  -     Lipid Panel with Direct LDL reflex; Future  -     Vitamin D 25 hydroxy;  Future  -     Hemoglobin A1C; Future  - TSH, 3rd generation with Free T4 reflex; Future  -     PSA, total and free; Future  -     Comprehensive metabolic panel; Future    Abnormal finding of blood chemistry   -     Hemoglobin A1C; Future    Hyperlipidemia, unspecified hyperlipidemia type    Sleep apnea, unspecified type    Need for vaccination against Streptococcus pneumoniae using pneumococcal conjugate vaccine 13  -     Cancel: PNEUMOCOCCAL CONJUGATE VACCINE 13-VALENT GREATER THAN 6 MONTHS  -     PNEUMOCOCCAL CONJUGATE VACCINE 13-VALENT GREATER THAN 6 MONTHS            Current Outpatient Prescriptions:     aspirin 81 MG tablet, Take 1 tablet by mouth daily, Disp: , Rfl:     clonazePAM (KlonoPIN) 1 mg tablet, Take 0 5 mg by mouth 2 (two) times a day as needed for seizures, Disp: , Rfl:     escitalopram (LEXAPRO) 10 mg tablet, Take 20 mg by mouth every morning, Disp: , Rfl: 10    Flaxseed, Linseed, (FLAX SEED OIL) 1300 MG CAPS, Take by mouth, Disp: , Rfl:     fluticasone (FLONASE) 50 mcg/act nasal spray, 2 sprays into each nostril as needed  , Disp: , Rfl:     meloxicam (MOBIC) 15 mg tablet, Take 1 tablet (15 mg total) by mouth daily for 30 days, Disp: 30 tablet, Rfl: 0    Multiple Vitamin ([employer] DAILY MULTIPLE PO), Take by mouth, Disp: , Rfl:     pravastatin (PRAVACHOL) 20 mg tablet, Take 1 tablet (20 mg total) by mouth daily for 90 days, Disp: 90 tablet, Rfl: 0    Subjective:      Patient ID: Fuentes Gatica is a 70 y o  male  HPI     Patient presents for routine checkup  Denies any recent hospitalizations or surgeries  He states that he is currently seeing neuropsychiatry for the issues with memory  He states that it is becoming increasingly difficult for him to remember things  He had formal workup by Neurosurgery who stated that he would benefit from Psychiatry evaluation for rule out of possible depression resulting in pseudo dementia    Patient currently denies any depressive symptoms and states that his mood is pretty well off except that he is getting frustrated that he keeps forgetting things  Patient also reports that he has history of right knee pain and after completing physical therapy his pain has been greatly diminished  We encouraged him to continue with the exercises that were taught and showed to him  Patient also complains of previous history of bilateral hernias and he is undergoing ultrasound of the groin tomorrow for evaluation  Patient also complained of lower back pain and stated that he has a history of lumbar disc herniations  He deferred any imaging at this time and stated that he would like to continue with the Tylenol therapy that he has been taking at home  He states that the Tylenol is not given him much relief and we will start him on meloxicam to see if there is any benefit  He will call back within 1 week if his symptoms do not improve so that we will consider x-ray imaging as well as orthopedic evaluation  Patient also complains of gas bloating sensation as well as straining whenever he has to have a bowel movement  We encouraged patient to attempt to she prune juice at this time as well as increasing his fluid and fiber intake  Patient received the Prevnar 13 vaccine today as well as a script for the shingles vaccine  He also reports that the Zetia is too expensive and we will DC that based off on his current set of labs  His LDL is 98 and HDL 61 well controlled on pravastatin  The following portions of the patient's history were reviewed and updated as appropriate: allergies, current medications, past family history, past medical history, past social history, past surgical history and problem list     Review of Systems   Constitutional: Negative for activity change, appetite change, fatigue and fever  HENT: Negative for congestion, ear pain, hearing loss, sore throat and tinnitus  Eyes: Negative for photophobia, pain and visual disturbance     Respiratory: Negative for cough, shortness of breath and wheezing  Cardiovascular: Negative for chest pain and leg swelling  Gastrointestinal: Positive for constipation  Negative for abdominal distention, abdominal pain, nausea and vomiting  Bloating   Genitourinary: Negative for difficulty urinating, frequency and hematuria  Musculoskeletal: Positive for arthralgias (right knee) and back pain (lumbar and sacral)  Negative for joint swelling, neck pain and neck stiffness  Skin: Negative for color change, pallor, rash and wound  Neurological: Negative for dizziness, tremors, numbness and headaches  Hematological: Does not bruise/bleed easily  Psychiatric/Behavioral: Negative for decreased concentration  Forgetfullness         Objective:      /78   Pulse 76   Resp 17   Ht 5' 7" (1 702 m)   Wt 74 4 kg (164 lb)   SpO2 98%   BMI 25 69 kg/m²          Physical Exam   Constitutional: He is oriented to person, place, and time  He appears well-developed and well-nourished  HENT:   Head: Normocephalic and atraumatic  Right Ear: External ear normal    Left Ear: External ear normal    Mouth/Throat: Oropharynx is clear and moist    Eyes: Pupils are equal, round, and reactive to light  Conjunctivae and EOM are normal    Neck: Normal range of motion  Neck supple  No JVD present  No thyromegaly present  Cardiovascular: Normal rate, regular rhythm and normal heart sounds  No murmur heard  Pulmonary/Chest: Effort normal and breath sounds normal  No respiratory distress  He has no wheezes  Abdominal: Soft  Bowel sounds are normal  He exhibits no distension and no mass  There is no tenderness  There is no rebound and no guarding  Musculoskeletal: Normal range of motion  He exhibits tenderness (lumbar and sacral and right patella)  He exhibits no edema or deformity  Lymphadenopathy:     He has no cervical adenopathy  Neurological: He is alert and oriented to person, place, and time  He has normal reflexes     Skin: Skin is warm and dry  No rash noted  No erythema  Vitals reviewed

## 2018-12-11 ENCOUNTER — HOSPITAL ENCOUNTER (OUTPATIENT)
Dept: RADIOLOGY | Facility: MEDICAL CENTER | Age: 63
Discharge: HOME | End: 2018-12-11
Payer: MEDICARE

## 2018-12-11 DIAGNOSIS — K40.00 NON-RECURRENT BILATERAL INGUINAL HERNIA WITH OBSTRUCTION WITHOUT GANGRENE: ICD-10-CM

## 2018-12-11 PROCEDURE — 76705 ECHO EXAM OF ABDOMEN: CPT

## 2018-12-28 NOTE — PROGRESS NOTES
1/2/2019    Janay Russell  1955  [id number]    Discussion and Plan    Examination is noteworthy for mild BPH  No nodularity  I have recommended therefore a proceed with PSA now and again in 1 year prior to next visit  All questions answered at this time  1  Benign prostatic hyperplasia, unspecified whether lower urinary tract symptoms present  - PSA Total, Diagnostic; Future  - PSA Total, Diagnostic; Future    Assessment      Patient Active Problem List   Diagnosis    Anxiety    Backache    Benign prostatic hyperplasia    Cough    Depression with anxiety    Dizziness    Dyspnea    Elevated ALT measurement    Elevated AST (SGOT)    Fatigue    Hyperlipidemia    Loss of hearing    Malignant melanoma of skin (HCC)    Memory loss    Pain in ankle joint    Pain of upper extremity    Palpitation    Sleep apnea    Symptoms involving urinary system    Vitamin D deficiency       History of Present Illness    Tatyana Feliz is a 70 y o  male seen today in regards to a history of BPH in prostate examination  PSAs previously have been normal   He denies any urinary complaints  No history of hematuria urinary tract infection  Denies any known family history of prostate cancer  Last PSA was in 2017      Urinary Symptom Assessment        Past Medical History  Past Medical History:   Diagnosis Date    Anxiety     BPH with obstruction/lower urinary tract symptoms     LAST ASSESSED: 9/15/14    Depression     Hx of laceration of skin     LAST ASSESSED: 11/3/14    Hyperlipidemia     Long term use of drug     LAST ASSESSED: 3/8/14    Memory loss     Pneumonia     LAST ASSESSED: 5/12/14    Sleep apnea     Tachycardia     LAST ASSESSED: 3/8/14    Thrombophlebitis of superficial veins of upper extremities     LAST ASSESSED: 5/4/65    Uncomplicated alcohol abuse     LAST ASSESSED: 2/28/15    Weakness of both arms     LAST ASSESSED: 5/12/14    Weakness of both legs     LAST ASSESSED: 5/12/14 Past Social History  Past Surgical History:   Procedure Laterality Date    COLONOSCOPY      COMPLETE    HERNIA REPAIR         Past Family History  Family History   Problem Relation Age of Onset    Arthritis Mother     Hyperlipidemia Mother     Dementia Mother         started late 76s    Tinnitus Mother     Arthritis Family     Hyperlipidemia Family        Past Social history  Social History     Social History    Marital status: /Civil Union     Spouse name: N/A    Number of children: N/A    Years of education: N/A     Occupational History    Not on file  Social History Main Topics    Smoking status: Former Smoker    Smokeless tobacco: Never Used    Alcohol use Yes      Comment: BEING A SOCIAL DRINKER    Drug use: No    Sexual activity: Not on file     Other Topics Concern    Not on file     Social History Narrative    No narrative on file       Current Medications  Current Outpatient Prescriptions   Medication Sig Dispense Refill    aspirin 81 MG tablet Take 1 tablet by mouth daily      azithromycin (ZITHROMAX) 250 mg tablet Take 2 tablets by mouth on day 1, take 1 tablet by mouth on days 2 through 5  6 tablet 0    clonazePAM (KlonoPIN) 1 mg tablet Take 0 5 mg by mouth 2 (two) times a day as needed for seizures      escitalopram (LEXAPRO) 10 mg tablet Take 20 mg by mouth every morning  10    Flaxseed, Linseed, (FLAX SEED OIL) 1300 MG CAPS Take by mouth      fluticasone (FLONASE) 50 mcg/act nasal spray 2 sprays into each nostril as needed        ipratropium (ATROVENT) 0 03 % nasal spray 2 sprays into each nostril every 12 (twelve) hours 30 mL 0    meloxicam (MOBIC) 15 mg tablet Take 1 tablet (15 mg total) by mouth daily for 30 days 30 tablet 0    Multiple Vitamin ([facility] DAILY MULTIPLE PO) Take by mouth      pravastatin (PRAVACHOL) 20 mg tablet Take 1 tablet (20 mg total) by mouth daily for 90 days 90 tablet 0     No current facility-administered medications for this visit  Allergies  Allergies   Allergen Reactions    Fish Oil + D3 [Fish Oil-Cholecalciferol] Rash    Oseltamivir Rash       Past Medical History, Social History, Family History, medications and allergies were reviewed  Review of Systems  Review of Systems   Constitutional: Negative  HENT: Negative  Eyes: Negative  Respiratory: Negative  Cardiovascular: Negative  Gastrointestinal: Negative  Endocrine: Negative  Genitourinary: Negative for decreased urine volume, difficulty urinating, hematuria and urgency  Musculoskeletal: Negative  Skin: Negative  Neurological: Negative  Hematological: Negative  Psychiatric/Behavioral: Negative  Vitals  Vitals:    01/02/19 1341   BP: 132/80   BP Location: Left arm   Patient Position: Sitting   Cuff Size: Adult   Pulse: 76   Weight: 75 kg (165 lb 6 4 oz)   Height: 5' 6" (1 676 m)         Physical Exam    Physical Exam   Constitutional: He is oriented to person, place, and time  He appears well-developed and well-nourished  HENT:   Head: Normocephalic and atraumatic  Eyes: Pupils are equal, round, and reactive to light  Neck: Normal range of motion  Cardiovascular: Normal rate, regular rhythm and normal heart sounds  Pulmonary/Chest: Effort normal and breath sounds normal  No accessory muscle usage  No respiratory distress  Abdominal: Soft  Normal appearance and bowel sounds are normal  There is no tenderness  Genitourinary: Rectum normal, prostate normal and penis normal  No penile tenderness  Genitourinary Comments: Prostate 35 g  No nodules   Musculoskeletal: Normal range of motion  Neurological: He is alert and oriented to person, place, and time  Skin: Skin is warm, dry and intact  Psychiatric: He has a normal mood and affect  His speech is normal  Cognition and memory are normal    Nursing note and vitals reviewed        Results    Below listed labs, pathology results, and radiology images were personally reviewed:    No results found for: PSA  Lab Results   Component Value Date    GLUCOSE 97 02/16/2014    CALCIUM 9 4 12/01/2018     09/07/2017    K 4 2 12/01/2018    CO2 31 12/01/2018     12/01/2018    BUN 16 12/01/2018    CREATININE 0 97 12/01/2018     Lab Results   Component Value Date    WBC 4 49 12/01/2018    HGB 14 8 12/01/2018    HCT 45 1 12/01/2018    MCV 91 12/01/2018     12/01/2018       No results found for this or any previous visit (from the past 1 hour(s)) ]    Component      Latest Ref Rng & Units 3/22/2016 4/1/2017   PROSTATE SPECIFIC ANTIGEN TOTAL      < OR = 4 0 ng/mL 0 4 0 4     -PSA

## 2018-12-29 ENCOUNTER — OFFICE VISIT (OUTPATIENT)
Dept: URGENT CARE | Facility: MEDICAL CENTER | Age: 63
End: 2018-12-29
Payer: MEDICARE

## 2018-12-29 VITALS
BODY MASS INDEX: 23.99 KG/M2 | WEIGHT: 162 LBS | SYSTOLIC BLOOD PRESSURE: 102 MMHG | HEART RATE: 90 BPM | OXYGEN SATURATION: 95 % | TEMPERATURE: 98.6 F | HEIGHT: 69 IN | RESPIRATION RATE: 17 BRPM | DIASTOLIC BLOOD PRESSURE: 64 MMHG

## 2018-12-29 DIAGNOSIS — J01.90 ACUTE SINUSITIS, RECURRENCE NOT SPECIFIED, UNSPECIFIED LOCATION: Primary | ICD-10-CM

## 2018-12-29 PROCEDURE — 99213 OFFICE O/P EST LOW 20 MIN: CPT | Performed by: PHYSICIAN ASSISTANT

## 2018-12-29 PROCEDURE — G0463 HOSPITAL OUTPT CLINIC VISIT: HCPCS | Performed by: PHYSICIAN ASSISTANT

## 2018-12-29 RX ORDER — IPRATROPIUM BROMIDE 21 UG/1
2 SPRAY, METERED NASAL EVERY 12 HOURS
Qty: 30 ML | Refills: 0 | Status: SHIPPED | OUTPATIENT
Start: 2018-12-29 | End: 2019-02-19

## 2018-12-29 RX ORDER — AZITHROMYCIN 250 MG/1
TABLET, FILM COATED ORAL
Qty: 6 TABLET | Refills: 0 | Status: SHIPPED | OUTPATIENT
Start: 2018-12-29 | End: 2019-01-03

## 2018-12-29 NOTE — PROGRESS NOTES
[facility]      NAME: Shady Bro is a 70 y o  male  : 1955    MRN: [id number]  DATE: 2018  TIME: 12:58 PM    Assessment and Plan   Acute sinusitis, recurrence not specified, unspecified location [J01 90]  1  Acute sinusitis, recurrence not specified, unspecified location  ipratropium (ATROVENT) 0 03 % nasal spray    azithromycin (ZITHROMAX) 250 mg tablet       Patient Instructions     Increase fluids and rest  Warm saltwater gargles, hot tea with honey and lemon, throat lozenges, Chloraseptic spray as needed for sore throat relief  Tylenol and Advil as needed for fever and chills  Monitor for severe worsening of current symptoms, difficulty breathing, swallowing, uncontrollable fever or chills  With these symptoms follow up with PCP or ER immediately  Otherwise follow-up with PCP in 3-5 days if symptoms are not improving  If no improvement in 24-48 hours, then fill script    Chief Complaint     Chief Complaint   Patient presents with    Cough     x4 days with a productive (denies wheezing or sob), Left ear is clogged and nasal congestion         History of Present Illness   Shady Bro presents to the clinic c/o      This is a 66-year-old male, presents for evaluation of productive cough, as well as left ear feeling clogged sensation, as well as nasal congestion for the last 3-4 days  He is concerned because his symptoms feel like they are worsening or not improving  He denies any fever, shortness breath, wheezing, nausea vomiting diarrhea  Wife is concerned, because in the past patient has had these viral syndromes, the turn into pneumonia  Review of Systems   Review of Systems   HENT: Positive for ear pain, sinus pressure and sore throat  Respiratory: Positive for cough            Current Medications     Long-Term Prescriptions   Medication Sig Dispense Refill    aspirin 81 MG tablet Take 1 tablet by mouth daily      clonazePAM (KlonoPIN) 1 mg tablet Take 0 5 mg by mouth 2 (two) times a day as needed for seizures      escitalopram (LEXAPRO) 10 mg tablet Take 20 mg by mouth every morning  10    fluticasone (FLONASE) 50 mcg/act nasal spray 2 sprays into each nostril as needed        ipratropium (ATROVENT) 0 03 % nasal spray 2 sprays into each nostril every 12 (twelve) hours 30 mL 0    meloxicam (MOBIC) 15 mg tablet Take 1 tablet (15 mg total) by mouth daily for 30 days 30 tablet 0    pravastatin (PRAVACHOL) 20 mg tablet Take 1 tablet (20 mg total) by mouth daily for 90 days 90 tablet 0       Current Allergies     Allergies as of 12/29/2018 - Reviewed 12/29/2018   Allergen Reaction Noted    Fish oil + d3 [fish oil-cholecalciferol] Rash 02/27/2014    Oseltamivir Rash 02/27/2014            The following portions of the patient's history were reviewed and updated as appropriate: allergies, current medications, past family history, past medical history, past social history, past surgical history and problem list     HISTORICAL INFO:  Past Medical History:   Diagnosis Date    Anxiety     BPH with obstruction/lower urinary tract symptoms     LAST ASSESSED: 9/15/14    Depression     Hx of laceration of skin     LAST ASSESSED: 11/3/14    Hyperlipidemia     Long term use of drug     LAST ASSESSED: 3/8/14    Memory loss     Pneumonia     LAST ASSESSED: 5/12/14    Sleep apnea     Tachycardia     LAST ASSESSED: 3/8/14    Thrombophlebitis of superficial veins of upper extremities     LAST ASSESSED: 1/5/11    Uncomplicated alcohol abuse     LAST ASSESSED: 2/28/15    Weakness of both arms     LAST ASSESSED: 5/12/14    Weakness of both legs     LAST ASSESSED: 5/12/14     Past Surgical History:   Procedure Laterality Date    COLONOSCOPY      COMPLETE    HERNIA REPAIR         Objective   /64   Pulse 90   Temp 98 6 °F (37 °C) (Temporal)   Resp 17   Ht 5' 9" (1 753 m)   Wt 73 5 kg (162 lb)   SpO2 95%   BMI 23 92 kg/m²        Physical Exam Physical Exam   Constitutional: He appears well-developed and well-nourished  No distress  HENT:   Head: Normocephalic and atraumatic  Right Ear: Tympanic membrane is bulging  Left Ear: Tympanic membrane is bulging  Nose: Right sinus exhibits no maxillary sinus tenderness  Left sinus exhibits no maxillary sinus tenderness  Mouth/Throat: Posterior oropharyngeal erythema present  No oropharyngeal exudate or posterior oropharyngeal edema  Neck: Normal range of motion  Neck supple  No tracheal deviation present  No thyromegaly present  Cardiovascular: Normal rate, regular rhythm and normal heart sounds  Pulmonary/Chest: Effort normal and breath sounds normal  No respiratory distress  He has no wheezes  He has no rales  Lymphadenopathy:     He has no cervical adenopathy  Skin: Skin is warm and dry  He is not diaphoretic  Nursing note and vitals reviewed  M*Modal software was used to dictate this note  It may contain errors with dictating incorrect words/spelling  Please contact provider directly for any questions

## 2018-12-29 NOTE — PATIENT INSTRUCTIONS
Sinusitis   AMBULATORY CARE:   Sinusitis  is inflammation or infection of your sinuses  It is most often caused by a virus  Acute sinusitis may last up to 12 weeks  Chronic sinusitis lasts longer than 12 weeks  Recurrent sinusitis means you have 4 or more times in 1 year  Common symptoms include the following:   · Fever    · Pain, pressure, redness, or swelling around the forehead, cheeks, or eyes    · Thick yellow or green discharge from your nose    · Tenderness when you touch your face over your sinuses    · Dry cough that happens mostly at night or when you lie down    · Headache and face pain that is worse when you lean forward    · Tooth pain, or pain when you chew  Seek care immediately if:   · Your eye and eyelid are red, swollen, and painful  · You cannot open your eye  · You have vision changes, such as double vision  · Your eyeball bulges out or you cannot move your eye  · You are more sleepy than normal, or you notice changes in your ability to think, move, or talk  · You have a stiff neck, a fever, or a bad headache  · You have swelling of your forehead or scalp  Contact your healthcare provider if:   · Your symptoms do not improve after 3 days  · Your symptoms do not go away after 10 days  · You have nausea and are vomiting  · Your nose is bleeding  · You have questions or concerns about your condition or care  Treatment for sinusitis:  Your symptoms may go away on their own  Your healthcare provider may recommend watchful waiting for up to 10 days before starting antibiotics  You may  need any of the following:  · Acetaminophen  decreases pain and fever  It is available without a doctor's order  Ask how much to take and how often to take it  Follow directions  Read the labels of all other medicines you are using to see if they also contain acetaminophen, or ask your doctor or pharmacist  Acetaminophen can cause liver damage if not taken correctly   Do not use more than 4 grams (4,000 milligrams) total of acetaminophen in one day  · NSAIDs , such as ibuprofen, help decrease swelling, pain, and fever  This medicine is available with or without a doctor's order  NSAIDs can cause stomach bleeding or kidney problems in certain people  If you take blood thinner medicine, always ask your healthcare provider if NSAIDs are safe for you  Always read the medicine label and follow directions  · Nasal steroid sprays  may help decrease inflammation in your nose and sinuses  · Decongestants  help reduce swelling and drain mucus in the nose and sinuses  They may help you breathe easier  · Antihistamines  help dry mucus in the nose and relieve sneezing  · Antibiotics  help treat or prevent a bacterial infection  · Take your medicine as directed  Contact your healthcare provider if you think your medicine is not helping or if you have side effects  Tell him or her if you are allergic to any medicine  Keep a list of the medicines, vitamins, and herbs you take  Include the amounts, and when and why you take them  Bring the list or the pill bottles to follow-up visits  Carry your medicine list with you in case of an emergency  Self-care:   · Rinse your sinuses  Use a sinus rinse device to rinse your nasal passages with a saline (salt water) solution or distilled water  Do not use tap water  This will help thin the mucus in your nose and rinse away pollen and dirt  It will also help reduce swelling so you can breathe normally  Ask your healthcare provider how often to do this  · Breathe in steam   Heat a bowl of water until you see steam  Lean over the bowl and make a tent over your head with a large towel  Breathe deeply for about 20 minutes  Be careful not to get too close to the steam or burn yourself  Do this 3 times a day  You can also breathe deeply when you take a hot shower  · Sleep with your head elevated    Place an extra pillow under your head before you go to sleep to help your sinuses drain  · Drink liquids as directed  Ask your healthcare provider how much liquid to drink each day and which liquids are best for you  Liquids will thin the mucus in your nose and help it drain  Avoid drinks that contain alcohol or caffeine  · Do not smoke, and avoid secondhand smoke  Nicotine and other chemicals in cigarettes and cigars can make your symptoms worse  Ask your healthcare provider for information if you currently smoke and need help to quit  E-cigarettes or smokeless tobacco still contain nicotine  Talk to your healthcare provider before you use these products  Prevent the spread of germs that cause sinusitis:  Wash your hands often with soap and water  Wash your hands after you use the bathroom, change a child's diaper, or sneeze  Wash your hands before you prepare or eat food  Follow up with your healthcare provider as directed: You may be referred to an ear, nose, and throat specialist  Write down your questions so you remember to ask them during your visits  © 2017 [facility] Information is for End User's use only and may not be sold, redistributed or otherwise used for commercial purposes  All illustrations and images included in CareNotes® are the copyrighted property of [employer]  or Tam Cavazos  The above information is an  only  It is not intended as medical advice for individual conditions or treatments  Talk to your doctor, nurse or pharmacist before following any medical regimen to see if it is safe and effective for you

## 2019-01-02 ENCOUNTER — OFFICE VISIT (OUTPATIENT)
Dept: UROLOGY | Facility: AMBULATORY SURGERY CENTER | Age: 64
End: 2019-01-02
Payer: MEDICARE

## 2019-01-02 VITALS
HEART RATE: 76 BPM | BODY MASS INDEX: 26.58 KG/M2 | HEIGHT: 66 IN | SYSTOLIC BLOOD PRESSURE: 132 MMHG | DIASTOLIC BLOOD PRESSURE: 80 MMHG | WEIGHT: 165.4 LBS

## 2019-01-02 DIAGNOSIS — N40.0 BENIGN PROSTATIC HYPERPLASIA, UNSPECIFIED WHETHER LOWER URINARY TRACT SYMPTOMS PRESENT: Primary | Chronic | ICD-10-CM

## 2019-01-02 PROCEDURE — 99203 OFFICE O/P NEW LOW 30 MIN: CPT | Performed by: UROLOGY

## 2019-01-03 DIAGNOSIS — M53.3 SACRAL BACK PAIN: ICD-10-CM

## 2019-01-03 RX ORDER — MELOXICAM 15 MG/1
15 TABLET ORAL DAILY
Qty: 90 TABLET | Refills: 0 | Status: SHIPPED | OUTPATIENT
Start: 2019-01-03 | End: 2019-06-18 | Stop reason: SDUPTHER

## 2019-01-05 ENCOUNTER — APPOINTMENT (OUTPATIENT)
Dept: LAB | Facility: MEDICAL CENTER | Age: 64
End: 2019-01-05
Payer: MEDICARE

## 2019-01-05 DIAGNOSIS — N40.0 BENIGN PROSTATIC HYPERPLASIA, UNSPECIFIED WHETHER LOWER URINARY TRACT SYMPTOMS PRESENT: Chronic | ICD-10-CM

## 2019-01-05 LAB — PSA SERPL-MCNC: 0.5 NG/ML (ref 0–4)

## 2019-01-05 PROCEDURE — 84153 ASSAY OF PSA TOTAL: CPT

## 2019-01-25 DIAGNOSIS — J01.90 ACUTE SINUSITIS, RECURRENCE NOT SPECIFIED, UNSPECIFIED LOCATION: ICD-10-CM

## 2019-01-25 RX ORDER — IPRATROPIUM BROMIDE 21 UG/1
SPRAY, METERED NASAL
Refills: 0 | OUTPATIENT
Start: 2019-01-25

## 2019-02-18 DIAGNOSIS — E78.01 FAMILIAL HYPERCHOLESTEROLEMIA: Chronic | ICD-10-CM

## 2019-02-18 RX ORDER — PRAVASTATIN SODIUM 20 MG
TABLET ORAL
Qty: 90 TABLET | Refills: 2 | Status: SHIPPED | OUTPATIENT
Start: 2019-02-18 | End: 2019-06-18 | Stop reason: SDUPTHER

## 2019-02-19 ENCOUNTER — OFFICE VISIT (OUTPATIENT)
Dept: PSYCHIATRY | Facility: CLINIC | Age: 64
End: 2019-02-19
Payer: MEDICARE

## 2019-02-19 DIAGNOSIS — F33.1 MAJOR DEPRESSIVE DISORDER, RECURRENT, MODERATE (HCC): Primary | ICD-10-CM

## 2019-02-19 DIAGNOSIS — G31.84 MILD COGNITIVE IMPAIRMENT WITH MEMORY LOSS: ICD-10-CM

## 2019-02-19 PROCEDURE — 90792 PSYCH DIAG EVAL W/MED SRVCS: CPT | Performed by: PSYCHIATRY & NEUROLOGY

## 2019-02-19 RX ORDER — ESCITALOPRAM OXALATE 10 MG/1
10 TABLET ORAL EVERY MORNING
Qty: 30 TABLET | Refills: 1 | Status: SHIPPED | OUTPATIENT
Start: 2019-02-19 | End: 2019-03-26 | Stop reason: DRUGHIGH

## 2019-02-19 RX ORDER — CITALOPRAM 10 MG/1
10 TABLET ORAL DAILY
Qty: 60 TABLET | Refills: 1 | Status: SHIPPED | OUTPATIENT
Start: 2019-02-19 | End: 2019-03-26

## 2019-02-19 NOTE — PATIENT INSTRUCTIONS
Major depressive disorder, recurrent, moderate (HCC)  -     citalopram (CeleXA) 10 mg tablet; Take 1 tablet (10 mg total) by mouth daily  -     escitalopram (LEXAPRO) 10 mg tablet; Take 1 tablet (10 mg total) by mouth every morning  - taper the lexapro to 10mg a day for 1 week  At the same time, start celexa at 10mg a day for 1 week  Whjen off the lexapro, increase the celexa to 20mg a day  Mild cognitive impairment with memory loss  -     Ambulatory referral to Occupational Therapy;  Future  - call to schedule follow up with neurology      follow up in 4 weeks

## 2019-02-19 NOTE — PSYCH
Reason for visit:   Chief Complaint   Patient presents with   Vikki Mauro is a 69yo M with DREW, malignant melanoma, BPH, HLD, and fatigue is being seen for a psychiatric evaluation in the context of alcohol use, depression, and anxiety  Primary complaints include: memory difficulty and depression  Onset of symptoms was gradual starting a few years ago with gradually worsening course since that time  Psychosocial Stressors: health  The patient presented with his wife  She stated that since he retired a year ago he has been very forgetful  She stated that he has been checked for dementia, which was negative  She wants to know if part of it is anxiety  She stated that he worries a lot about the house, fix things around the house, and overall things that have and have not yet occurred  He stated that since he retired, he feels that he has been tired often, watching a lot of TV, sleeping well but it is interrupted by his wife waking up at 230am and then after a cup of coffee he will doze on the couch  His wife is concerned that he is not motivated to do anything  He agrees that it has slowed but not to a degree where he does not want to do anything  He forgets things that were told to him the night before  This started before he retired but this has progressed  He does forget names of people that he has known in the past  It may take him sometime to get to remember it  He used to be outgoing but now he is more subdued and does not want to go out any longer  He feels that this is because he is always tired  "I feel down a bit, I get mixed up a lot " He does have thoughts of wanting suicide  He thinks, "whats the point of it all, why am I here?" He stated that this has been around the time when his wife pushes him to do things like play a game or motivate to do anything  He stated that he would not take any steps to hasten his death  He denied SI/HI at this time  He denied AVH  His mother has dementia, it makes it difficult to see his mother the way that she is  He is currently on Lexapro and has been on it for 1 year  His anxiety has somewhat improved  Not much change in the depression  Has had RLS symptoms  He was on Klonopin which made him very sedated  Withdrawn, frustrated with memory loss  Review Of Systems:     Mood Depression   Behavior see HPI   Thought Content Disturbing Thoughts, Feelings   General Emotional Problems and Decreased Functioning   Personality Change in Personality   Other Psych Symptoms se HPI   Constitutional good appetite no change in weight   ENT Loss of Hearing   Cardiovascular Negative   Respiratory Negative   Gastrointestinal Negative   Genitourinary Negative   Musculoskeletal Negative   Integumentary Negative   Neurological Negative   Endocrine Normal    Other Symptoms Normal        Past Psychiatric History:      Past Inpatient Psychiatric Treatment:   In Patient:denied  Drug/ alcohol treatment: denied   Past Outpatient Psychiatric Treatment:    Couple therapy in the past  Past Suicide Attempts:    no  Past Violent Behavior:    no  Past Psychiatric Medication Trials:    Lexapro and Klonopin    Family Psychiatric History:   Family History   Problem Relation Age of Onset    Arthritis Mother     Hyperlipidemia Mother     Dementia Mother         started late 76s    Tinnitus Mother     Arthritis Family     Hyperlipidemia Family     Anxiety disorder Sister        Social History:    Education: technical college  Learning Disabilities: none  Marital history:   Living arrangement, social support: The patient lives in home with wife and daughter, age 39  Support systems: wife and children Family financial problems: he is retired   Occupational History: retired   Functioning Relationships: good support system and good relationship with children     Other Pertinent History: None     Social History     Substance and Sexual Activity   Drug Use No Traumatic History:       Abuse: none  Other Traumatic Events: none    The following portions of the patient's history were reviewed and updated as appropriate: allergies, current medications, past family history, past medical history, past social history, past surgical history and problem list      Social History     Socioeconomic History    Marital status: /Civil Union     Spouse name: Not on file    Number of children: 3    Years of education: 12+    Highest education level: Not on file   Occupational History    Occupation: retired   Social Needs    Financial resource strain: Somewhat hard   [address] insecurity:     Worry: Not on file     Inability: Not on file    Transportation needs:     Medical: Not on file     Non-medical: Not on file   Tobacco Use    Smoking status: Former Smoker    Smokeless tobacco: Never Used    Tobacco comment: quit 20 years ago   Substance and Sexual Activity    Alcohol use: Yes     Frequency: 2-3 times a week     Drinks per session: 1 or 2     Binge frequency: Never     Comment: BEING A SOCIAL DRINKER    Drug use: No    Sexual activity: Not on file   Lifestyle    Physical activity:     Days per week: Not on file     Minutes per session: Not on file    Stress: Not on file   Relationships    Social connections:     Talks on phone: Not on file     Gets together: Not on file     Attends Holiness service: Not on file     Active member of club or organization: Not on file     Attends meetings of clubs or organizations: Not on file     Relationship status: Not on file    Intimate partner violence:     Fear of current or ex partner: Not on file     Emotionally abused: Not on file     Physically abused: Not on file     Forced sexual activity: Not on file   Other Topics Concern    Not on file   Social History Narrative    Not on file     Social History     Social History Narrative    Not on file       Mental status:  Appearance calm and cooperative , adequate hygiene and grooming and good eye contact    Mood dysphoric   Affect affect was blunted   Speech decreased volume and a normal rate   Thought Processes coherent/organized and normal thought processes   Hallucinations no hallucinations present    Thought Content no delusions   Abnormal Thoughts no suicidal thoughts  and no homicidal thoughts    Orientation  oriented to person and place and time   Remote Memory short term memory impaired and long term memory impaired   Attention Span concentration intact   Intellect Appears to be of Average Intelligence   Insight Limited insight   Judgement judgment was limited   Muscle Strength Muscle strength and tone were normal and Normal gait    Language difficulty with naming psychiatrist, can say doctors office   Fund of Knowledge was not able to name the president or current events  Pain none   Pain Scale 0         Laboratory Results:   Results for Elena Forrester (MRN [id number]) as of 2/19/2019 15:28   Ref   Range 12/1/2018 07:49   eGFR Latest Units: ml/min/1 73sq m 82   Sodium Latest Ref Range: 136 - 145 mmol/L 134 (L)   Potassium Latest Ref Range: 3 5 - 5 3 mmol/L 4 2   Chloride Latest Ref Range: 100 - 108 mmol/L 103   CO2 Latest Ref Range: 21 - 32 mmol/L 31   Anion Gap Latest Ref Range: 4 - 13 mmol/L 0 (L)   BUN Latest Ref Range: 5 - 25 mg/dL 16   Creatinine Latest Ref Range: 0 60 - 1 30 mg/dL 0 97   GLUCOSE FASTING Latest Ref Range: 65 - 99 mg/dL 86   Calcium Latest Ref Range: 8 3 - 10 1 mg/dL 9 4   AST Latest Ref Range: 5 - 45 U/L 26   ALT Latest Ref Range: 12 - 78 U/L 45   Alkaline Phosphatase Latest Ref Range: 46 - 116 U/L 83   Total Protein Latest Ref Range: 6 4 - 8 2 g/dL 7 2   Albumin Latest Ref Range: 3 5 - 5 0 g/dL 3 8   TOTAL BILIRUBIN Latest Ref Range: 0 20 - 1 00 mg/dL 0 46   Cholesterol Latest Ref Range: 50 - 200 mg/dL 178   Triglycerides Latest Ref Range: <=150 mg/dL 78   HDL Latest Ref Range: 40 - 60 mg/dL 61 (H)   EXT LDL, Direct  Latest Ref Range: 0 - 100 mg/dl 98   WBC Latest Ref Range: 4 31 - 10 16 Thousand/uL 4 49   Red Blood Cell Count Latest Ref Range: 3 88 - 5 62 Million/uL 4 97   Hemoglobin Latest Ref Range: 12 0 - 17 0 g/dL 14 8   HCT Latest Ref Range: 36 5 - 49 3 % 45 1   MCV Latest Ref Range: 82 - 98 fL 91   MCH Latest Ref Range: 26 8 - 34 3 pg 29 8   MCHC Latest Ref Range: 31 4 - 37 4 g/dL 32 8   RDW Latest Ref Range: 11 6 - 15 1 % 12 7   Platelet Count Latest Ref Range: 149 - 390 Thousands/uL 215   MPV Latest Ref Range: 8 9 - 12 7 fL 10 6   nRBC Latest Units: /100 WBCs 0   Neutrophils % Latest Ref Range: 43 - 75 % 53   Immat GRANS % Latest Ref Range: 0 - 2 % 0   Lymphocytes Relative Latest Ref Range: 14 - 44 % 29   Monocytes Relative Latest Ref Range: 4 - 12 % 11   Eosinophils Latest Ref Range: 0 - 6 % 6   Basophils Relative Latest Ref Range: 0 - 1 % 1   Immature Grans Absolute Latest Ref Range: 0 00 - 0 20 Thousand/uL 0 01   Absolute Neutrophils Latest Ref Range: 1 85 - 7 62 Thousands/µL 2 41   Lymphocytes Absolute Latest Ref Range: 0 60 - 4 47 Thousands/µL 1 28   Absolute Monocytes Latest Ref Range: 0 17 - 1 22 Thousand/µL 0 49   Absolute Eosinophils Latest Ref Range: 0 00 - 0 61 Thousand/µL 0 25   Basophils Absolute Latest Ref Range: 0 00 - 0 10 Thousands/µL 0 05   LYME AB IGG Latest Ref Range: 0 00 - 0 79  0 24   LYME AB IGM Latest Ref Range: 0 00 - 0 79  0 49       MRI BRAIN WITHOUT CONTRAST     INDICATION:   r41 3  History: pt co cognitive decline "cant think good no more"      COMPARISON:   None      TECHNIQUE:  Sagittal T1, axial T2, axial FLAIR, axial T1, axial Mcfarland and axial diffusion imaging  Coronal T2     IMAGE QUALITY:  Diagnostic      FINDINGS:     BRAIN PARENCHYMA:  There is no discrete mass, mass effect or midline shift  No abnormal white matter signal identified  Brainstem and cerebellum demonstrate normal signal  There is no intracranial hemorrhage  There is no evidence of acute infarction and   diffusion imaging is unremarkable     VENTRICLES:  The ventricles are normal in size and contour      SELLA AND PITUITARY GLAND:  Normal      ORBITS:  Normal      PARANASAL SINUSES:  Normal      VASCULATURE:  Evaluation of the major intracranial vasculature demonstrates appropriate flow voids      CALVARIUM AND SKULL BASE:  Normal      EXTRACRANIAL SOFT TISSUES:  Normal      IMPRESSION:        1  No acute infarction, intracranial hemorrhage or mass effect        Assessment/Plan:      Diagnoses and all orders for this visit:    Major depressive disorder, recurrent, moderate (HCC)  -     citalopram (CeleXA) 10 mg tablet; Take 1 tablet (10 mg total) by mouth daily  -     escitalopram (LEXAPRO) 10 mg tablet; Take 1 tablet (10 mg total) by mouth every morning  - taper the lexapro to 10mg a day for 1 week  At the same time, start celexa at 10mg a day for 1 week  Whjen off the lexapro, increase the celexa to 20mg a day  Mild cognitive impairment with memory loss  -     Ambulatory referral to Occupational Therapy; Future  - call to schedule follow up with neurology      follow up in 4 weeks    Treatment Recommendations- Risks Benefits         Immediate Medical/Psychiatric/Psychotherapy Treatments and Any Precautions: switch from lexapro to celexa to see if there will be a difference  Should continue to take SSRIs as there is some research to support their use in slowing the progression of dementia  His dementia/memory impairment has been fueling his depressive symptoms as he is frustrated and ashamed of his memory loss  He is isolated at home  He needs a lot of encouragement to get out of the house  He should follow up with his Neurologist Dr Holly Chavez as he was supposed to see him this week but does not have an appointment scheduled  He should also get back into occupational therapy for memory impairment      Risks, Benefits And Possible Side Effects Of Medications:  Risks, benefits, and possible side effects of medications explained to patient and patient verbalizes understanding    Controlled Medication Discussion: No records found for controlled prescriptions according to [address] [place] [address] Monitoring Program

## 2019-03-06 DIAGNOSIS — G31.84 MILD COGNITIVE IMPAIRMENT WITH MEMORY LOSS: ICD-10-CM

## 2019-03-06 DIAGNOSIS — Z76.89 NEED FOR SPEECH THERAPY ASSESSMENT: Primary | ICD-10-CM

## 2019-03-11 ENCOUNTER — TELEPHONE (OUTPATIENT)
Dept: PSYCHIATRY | Facility: CLINIC | Age: 64
End: 2019-03-11

## 2019-03-11 ENCOUNTER — TELEPHONE (OUTPATIENT)
Dept: NEUROLOGY | Facility: CLINIC | Age: 64
End: 2019-03-11

## 2019-03-11 NOTE — TELEPHONE ENCOUNTER
Pt's wife Josy Koenig called asking if pt needs to make f/u appt  Mariana Romero, per Dr Kirsty Oneal office notes 10/16/18, pt to f/u in 4 months around (2/16/19)  Pt preferred [facility] location   Dr Albin Carrillo, are you ok seeing this pt?              [phone]

## 2019-03-11 NOTE — TELEPHONE ENCOUNTER
It doesn't seem like he would need long term follow up with us  Not sure it makes sense to switch providers just to be told to come back as needed  Up to Dr Finesse Light  Happy to help if needed

## 2019-03-12 ENCOUNTER — APPOINTMENT (OUTPATIENT)
Dept: SPEECH THERAPY | Facility: CLINIC | Age: 64
End: 2019-03-12
Payer: MEDICARE

## 2019-03-12 NOTE — PROGRESS NOTES
Speech-Language Pathology Initial Evaluation    Today's date: 3/13/2019   Patients name: Nicole Boles  : 1955  MRN: [id number]  Safety measures: Depression, anxiety, decreased memory  Referring provider: Waqar Maza MD    Subjective comments: "I'm fine "    How did the patient hear about us? Prior patient    Patient's goal(s): "to try to straighten this out" (pointed to his head)    Reason for referral: Change in cognitive status  Prior functional status: Communication effective and appropriate in all situations  Clinically complex situations: Mental/behavioral diagnosis affecting rate of recovery and Previous therapy to address similar deficits    History: Patient is a 75 y o  male who was referred to outpatient skilled Speech Therapy services for a cognitive-linguistic evaluation  Patient is known to this Speech Therapy team--patient was evaluated on 2018 and received 4 treatment sessions prior to discharge  Patient's wife cancelled remaining appointments  Per review of record (2018), "It was reported that patient's wife left a message to cancel remaining ST/OT appointments at this time  Per review of record, patient's wife left a message with Dr Criselda Gerardo MA this morning reporting that "   she thinks Aaron Gloria is having more anxiety and stress than the usual  She wants him to she a psych, because she thinks that will help  Geoffrey Manifold Geoffrey Manifold Also she noticed he is having a lot of muscle cramping and tension   " Patient will be placed on a 30-day HOLD from outpatient skilled Speech Therapy services until he follows-up with psychology services " Patient never returned for any follow-up Speech Therapy sessions  Patient was evaluated by neurology (Dr Rahel Medina) on 10/16/2018  Per review of record, "  Geoffrey Manifold Geoffrey Manifold Much of his changes manifest as more impatience and frustration, but not true personality change and this may be related to his ongoing admitted depression   He has become overall more withdrawn into his home due to embarrassment and remains aware of his deficits  No clinical parkinsonism, and less likely a dementia such as Alzheimer's; More consistent with a cognitive impairment that could be medication/emotionally amplified as his symptoms are overall stable and he remains being able to handle most of his own care  We will continue to monitor   "    Patient was seen by internal medicine (Dr Jeremiah Garcia) on 12/10/2018  Per review of record, "   memory loss-patient's most recent mini-mental status exam from August was 26/30  Xu Graff patient completed MRI scan revealing no abnormalities   currently seeing neuropsychiatry at this time who thinks that pseudodementia may be anunderlining manifestation from depression  Xu Graff He states that he is currently seeing neuropsychiatry for the issues with memory  He states that it is becoming increasingly difficult for him to remember things  He had formal workup by Neurosurgery who stated that he would benefit from Psychiatry evaluation for rule out of possible depression resulting in pseudo dementia  "    Patient with a reported decline with his memory and attention gradually over the past 2 years  Patient indicated "getting sidetracked often" and forgetting the tasks he wishes to complete daily  MRI of brain was WNL and all labs, including thyroid function, B12, CBC, and SMA, were unremarkable  Patient with a significant PMH of hyperlipidemia, depression, and anxiety  Patient reported that his wife assists him with remembering to take his medications  Patient's wife reportedly completes all finance management tasks  Patient continues to drive and indicated that he has not gotten lost/in any recent accidents      Hearing: Reduced (b/l HAs)  Vision: Reduced (glasses)    Home environment/lifestyle: Lives with wife, eldest daughter, and two dogs  Highest level of education: High school  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)    Mental status: Alert  Behavior status: Cooperative  Communication modalities: Verbal  Rehabilitation prognosis: Good rehab potential to reach the established goals    Assessments    COGNITIVE CHECKLIST:  *Patient indicated that he is experiencing difficulties in the following areas:    · Memory: Remembering what people have told you, Remembering peoples' names, Learning new things, Remembering the date/day of the week and Keeping track of your appointments    · Attention: Easily distracted, Keeping your attention/concentrating on a task and Losing your train of thought    · Processing: Processing new information  and Following directions    · Executive Functions: Organizing your thoughts    · Communication: None    · Visual: None    · Emotional: Increased anxiety and Increased depression    · Increased Sensitivities to: None      The [facility] Financial Assessment-Geriatric: Second Edition (RIPA-) is a comprehensive, norm-referenced assessment battery designed to identify, describe, and quantify cognitive-linguistic deficits in the geriatric population (individuals 54 years and older)   The subtests of the RIPA  include: (1) Immediate Memory--repeat numbers, words, and sentences of increasing length and complexity; (2) Temporal Orientation--answer questions related to the concept of time (elicitation of accurate responses requires recall from recent and remote memory/temporal concepts require functional organizational skills); (3) Spatial Orientation--answer questions related to the concept of locations or places (elicitation of accurate responses requires recall from both recent and remote memory/spatial concepts require organizational skills, including categorization and sequencing); (4) General Information--recall general information encoded in remote memory (most stimuli represented in this subtest includes information learned by the sixth grade); (5) Situational Knowledge--respond to stimuli requiring problem solving and reasoning strategies (ability to generate response options is based on relevant information/once a solution is deduced, it must be checked against knowledge of reality and against reality itself); (6) Categorical Vocabulary--list items in a category as well as name categories names of items (elicitation of accurate responses require a semantic organization base, word finding abilities, and general organization skills; and (7) Listening Comprehension--listen to a short narrative paragraph and answer specific questions about the content (paragraphs included are of increasing length and complexity/elicitation of accurate response require skills of immediate memory, auditory sequential memory, receptive vocabulary, and processing speed)  The following results were obtained during the administration of the assessment:     Subtest: Raw score: Percentile:  Scaled Score: Degree of Severity: IE: Status:   1  Immediate Memory 17/39 4%tile 3 Moderate 23 DECLINE   2  Temporal Orientation 33/39 13%tile 8 Mild 28 IMPROVEMENT   3  Spatial Orientation 39/45 15%tile 8 Mild 37 IMPROVEMENT   4  General Information 35/54 10%tile 6 Mild 41 DECLINE   5  Situational Knowledge 38/45 16%tile 8 Mild 32 IMPROVEMENT   6  Categorical Vocabulary 32/45 10%tile 6 Mild 28 IMPROVEMENT   7  Listening Comprehension 38/72 10%tile 6 Mild 42 DECLINE       Information Processing Index:   IE: Status:   Sum of Scaled Scores: 45 42 IMPROVEMENT   Composite Index: 15 15 NO CHANGE   Percentile Rank: 1%tile 1 NO CHANGE   Degree of Severity:  Severe         IE indicates the scores from the initial evaluation (03/27/2018)  Goals  Short-term goals:  1  Patient and family will be educated on Speech Therapy recommendations, goals, and expectations for improved prognosis of care (to be achieved in 4-6 weeks)       2  Patient will be educated on the use of internal and external memory aids and compensatory strategies to facilitate increased orientation to time, routine, and recall of daily events (to be achieved in 1-2 weeks)  3  Patient will participate in continued cognitive-linguistic therapeutic trials in 90% of opportunities for continued assessment and recommendations for carryover and functional tasks at home (i e , HEP) (to be achieved in 4-6 weeks)       Long-term goals:  1  Patient will improve functional cognitive-linguistic skills with the implementation of cues and external aids (to be achieved by discharge)      2  Patient will demonstrate adequate memory/reasoning/judgment to perform desired activities in a supervised environment (to be achieved by discharge)  Impressions/Recommendations    Impressions: Patient presents with moderate cognitive-linguistic deficits c/b reduced immediate & short-term memory, direction following, processing speed, sustained & divided attention, thought organization, problem solving, and verbal expression skills  Clinician encouraged that patient's wife attend patient's future Speech Therapy session(s) to receive education on recommendations and for further development of POC  It is suspected that patients psychosocial stressors are contributing to his cognitive-linguistic dysfunction  This clinician agrees with patient's physicians that patient would benefit from a follow-up with a psychiatrist and/or neuropsychologist for evaluation for r/o of possible depression/anxiety resulting in pseudodementia  Patient demonstrated a positive score on depression screening, but denied help today  Clinician provided patient with education on the services that behavioral health provides  Per calendar in [facility], it appears that patient met (or was scheduled to meet) with a psychiatrist on 02/19/2019  Patient is scheduled to meet with the same psychiatrist on 03/26/2019  Patient did not report any suicidal ideation or plan to harm anyone else  Will continue to monitor       Recommendations:  -Patient would benefit from outpatient skilled Speech Therapy services : Cognitive-Linguistic therapy trial  -Follow-up with psychiatrist and/or neuropsychologist for further evaluation and treatment     -Frequency: 1-2x weekly  -Duration: 4-6 weeks    -Intervention certification from: 4/10/7932  -Intervention certification to: 78/81/0124    -Intervention comments:   Initial evaluation/administration of standardized test with patient (11:00am-12:00pm)  Scoring and interpretation of standardized test for the development of POC (1:30pm-2:00pm & 4:00pm-4:30pm)    Visit Tracking:  -Referring provider: Epic  -Billing guidelines: CMS  -Visit #1/10   -Medicare  [employer]   -RE due 04/13/2019

## 2019-03-12 NOTE — TELEPHONE ENCOUNTER
Thanks Dr Hallie Jensen  I will let the patient decide where he prefers; I would have liked to see him back just once perhaps at [facility] [address] office in April after his upcoming Psychiatry appt  Chano Burden, perhaps one of the APs may be available? If he is doing otherwise well, then would see only as needed

## 2019-03-13 ENCOUNTER — TELEPHONE (OUTPATIENT)
Dept: INTERNAL MEDICINE CLINIC | Facility: CLINIC | Age: 64
End: 2019-03-13

## 2019-03-13 ENCOUNTER — EVALUATION (OUTPATIENT)
Dept: SPEECH THERAPY | Facility: CLINIC | Age: 64
End: 2019-03-13
Payer: MEDICARE

## 2019-03-13 DIAGNOSIS — R41.3 MEMORY LOSS: ICD-10-CM

## 2019-03-13 DIAGNOSIS — R48.8 OTHER SYMBOLIC DYSFUNCTIONS: Primary | ICD-10-CM

## 2019-03-13 DIAGNOSIS — G31.84 MILD COGNITIVE IMPAIRMENT WITH MEMORY LOSS: ICD-10-CM

## 2019-03-13 PROCEDURE — 96125 COGNITIVE TEST BY HC PRO: CPT | Performed by: SPEECH-LANGUAGE PATHOLOGIST

## 2019-03-13 NOTE — TELEPHONE ENCOUNTER
Pt's therapist changed him to Citalopram, 20 mg last week and since then he's had a spasm like pain a couple times a day in his leg right above the knee  Pt was wondering if the two might be connected in any way  Please advise

## 2019-03-19 NOTE — PROGRESS NOTES
Daily Speech Treatment Note    Today's date: 3/20/2019  Patients name: Sb Farr  : 1955  MRN: [id number]  Safety measures: Depression, anxiety, decreased memory  Referring provider: Libia Ames MD    Primary Diagnosis/Billing code: T55 8  Secondary Diagnosis/ Billing code: G31 84, R41 3    Visit Tracking:  -Referring provider: Epic  -Billing guidelines: CMS  -Visit #2/10    -Liza Babcock   -RE due 2019  Subjective/Behavioral:  -"I haven't got better since last session "    Objective/Assessment:  -Patient's family member/caregiver was present during today's session   -Reviewed testing results and goals in plan care with patient  Patient is in agreement at this time  Short-term goals:  1  Patient and family will be educated on Speech Therapy recommendations, goals, and expectations for improved prognosis of care (to be achieved in 4-6 weeks)  -Spoke with both patient and patient's wife Devi Escobar) about patient's testing with RIPA-G (2018 vs  2019)  There was no consistency with testing scores during comparison (i e , improvements in some areas, and declines in others)  -Clinician explained the purpose of skilled Speech Therapy services (compensatory vs  restorative) as recommended for patient in his POC  Reciprocal comprehension verbally expressed  Patient participated in outpatient skilled ST and OT last year--per review of record, standardized assessment, and informal skilled observation, compensatory strategy training and education on HEP are warranted  -Spoke with patient and wife that it is suspected that patient's psychosocial stressors (depression/anxiety) may be contributing to his cognitive-linguistic dysfunction, and recommended that patient f/u with behavioral health services for further evaluation/treatment   Patient's wife confirmed that patient f/u with psychiatrist in 2018, and is scheduled for another f/u appointment next week      -Educated patient and wife re: the importance that patient continue to remain active in community to facilitate positive communication interactions with others to promote highest level of cognitive-linguistic functioning  Also, suggested other activities to support cognitive-linguistic skills, including crossword puzzles, word searches, reading the newspaper, doing puzzles, etc  Reciprocal comprehension verbally expressed  Suggested that patient and wife sit down together each evening to cross off date on calendar and review next day's activities  Also, educated wife to trial creating "to-do" lists for patient to assist with organizing the day as it was reported that he sits at home and sleeps during the majority of the days  2  Patient will be educated on the use of internal and external memory aids and compensatory strategies to facilitate increased orientation to time, routine, and recall of daily events (to be achieved in 1-2 weeks)  -Provided patient and wife with education on memory strategies briefly and provided a supplemental handout to review for HEP  Will discuss during next appointment  Provided patient and wife with education on patient journaling daily events in spiral notebook  Patient did not carryover this memory strategy during OT last year; however, this clinician education BOTH patient and wife re: the importance of carryover with this strategy  Patient's wife was educated to provide patient with reminders as needed to journal     3  Patient will participate in continued cognitive-linguistic therapeutic trials in 90% of opportunities for continued assessment and recommendations for carryover and functional tasks at home (i e , HEP) (to be achieved in 4-6 weeks)      Plan:  -Patient was provided with home exercises/activities to target goals in plan of care at the end of today's session   -Continue with current plan of care

## 2019-03-20 ENCOUNTER — OFFICE VISIT (OUTPATIENT)
Dept: SPEECH THERAPY | Facility: CLINIC | Age: 64
End: 2019-03-20
Payer: MEDICARE

## 2019-03-20 DIAGNOSIS — R41.3 MEMORY LOSS: ICD-10-CM

## 2019-03-20 DIAGNOSIS — G31.84 MILD COGNITIVE IMPAIRMENT WITH MEMORY LOSS: ICD-10-CM

## 2019-03-20 DIAGNOSIS — R48.8 OTHER SYMBOLIC DYSFUNCTIONS: Primary | ICD-10-CM

## 2019-03-20 PROCEDURE — 92507 TX SP LANG VOICE COMM INDIV: CPT | Performed by: SPEECH-LANGUAGE PATHOLOGIST

## 2019-03-24 NOTE — PROGRESS NOTES
Daily Speech Treatment Note    Today's date: 3/25/2019   Patients name: Sb Farr  : 1955  MRN: [id number]  Safety measures: Depression, anxiety, decreased memory  Referring provider: Libia Ames MD    Primary Diagnosis/Billing code: J50 9  Secondary Diagnosis/ Billing code: G31 84, R41 3    Visit Tracking:  -Referring provider: Epic  -Billing guidelines: CMS  -Visit #3/10   -Liza Sadiq   -RE due 2019  Subjective/Behavioral:  -"I remembered these papers "    Objective/Assessment:  -Reviewed patient's home exercises/activities completed since last appointment  Crossword puzzles (3 worksheets) completed with assistance from wife per patient report  Short-term goals:  1  Patient and family will be educated on Speech Therapy recommendations, goals, and expectations for improved prognosis of care (to be achieved in 4-6 weeks)  -- MET    2  Patient will be educated on the use of internal and external memory aids and compensatory strategies to facilitate increased orientation to time, routine, and recall of daily events (to be achieved in 1-2 weeks)  -Patient wrote brief notes in spiral notebook each day since last ST session to facilitate increased recall of daily events  Per patient report, his wife provided him with cues to remember to write in notebook  Patient was able to share events that he engaged in since last appointment (e g , bowling, Christian, breakfast, etc ) with this external memory aid  3  Patient will participate in continued cognitive-linguistic therapeutic trials in 90% of opportunities for continued assessment and recommendations for carryover and functional tasks at home (i e , HEP) (to be achieved in 4-6 weeks)     -Sequencing: Patient presented with ADL worksheets and asked to sequence a series of 4 events in chronological order of completion   Task completed in  opp (88%) independently, increasing to  opp (100% acc) with min verbal cues  Patient required min set-up assistance with activity     -Greene categorization: Patient was provided with 3 words belonging to the same category  Patient named an additional category member in 17/25 opp (68%) independently, increasing to 25/25 opp (100% acc) with min-mod semantic cues and additional processing time  Patient required mod set-up assistance with activity  Plan:  -Patient was provided with home exercises/activities to target goals in plan of care at the end of today's session   -Continue with current plan of care

## 2019-03-25 ENCOUNTER — OFFICE VISIT (OUTPATIENT)
Dept: SPEECH THERAPY | Facility: CLINIC | Age: 64
End: 2019-03-25
Payer: MEDICARE

## 2019-03-25 DIAGNOSIS — R48.8 OTHER SYMBOLIC DYSFUNCTIONS: Primary | ICD-10-CM

## 2019-03-25 DIAGNOSIS — G31.84 MILD COGNITIVE IMPAIRMENT WITH MEMORY LOSS: ICD-10-CM

## 2019-03-25 DIAGNOSIS — R41.3 MEMORY LOSS: ICD-10-CM

## 2019-03-25 PROCEDURE — 92507 TX SP LANG VOICE COMM INDIV: CPT | Performed by: SPEECH-LANGUAGE PATHOLOGIST

## 2019-03-26 ENCOUNTER — OFFICE VISIT (OUTPATIENT)
Dept: PSYCHIATRY | Facility: CLINIC | Age: 64
End: 2019-03-26
Payer: MEDICARE

## 2019-03-26 DIAGNOSIS — F33.1 MAJOR DEPRESSIVE DISORDER, RECURRENT, MODERATE (HCC): Primary | ICD-10-CM

## 2019-03-26 DIAGNOSIS — G31.84 MILD COGNITIVE IMPAIRMENT WITH MEMORY LOSS: ICD-10-CM

## 2019-03-26 PROCEDURE — 99214 OFFICE O/P EST MOD 30 MIN: CPT | Performed by: PSYCHIATRY & NEUROLOGY

## 2019-03-26 RX ORDER — CITALOPRAM 20 MG/1
30 TABLET ORAL DAILY
Qty: 90 TABLET | Refills: 3 | Status: SHIPPED | OUTPATIENT
Start: 2019-03-26 | End: 2019-06-18 | Stop reason: SDUPTHER

## 2019-03-26 NOTE — PSYCH
Subjective:     Patient ID: Renny Goodwin is a 75 y o  male with DREW, malignant melanoma, BPH, HLD, fatigue, memory impairment and depression is being seen for a follow up  He was seen last time and cross tapered from lexapro to celexa as it has been shown to have some benefit in patients with memory impairment  HPI ROS Appetite Changes and Sleep: normal appetite, increased energy, no weight change and decrease in number of sleep hours 8  the patient and his wife stated that he was able to get wean off of the lexapro and start on celexa  There may be some more gassiness but otherwise no other side effects  His wife endorsed seeing some improvements in his daily activity  She found that he will go out and do more  He has been able to recall more things from previous conversations earlier in the day  Other days he still is forgetful  He is playing more solitaire  He has gone to the diner now  He has gone back to the speech therapist and cognitive testing did not show worsening  He finds his mood a little up and down but his wife sees improvement  He is able to complete more tasks and the cross word puzzles he was assigned  He has been better and not as much  He is no longer pulling the curtains closed during the day  He has cut out the coffee at 230am when his wife gets up to go to work  Denied SI/HI  Denied passive death wish  Appetite is good without changes in his weight       Review Of Systems:     Mood Depression   Behavior Normal    Thought Content Normal   General Sleep Disturbances and Decreased Functioning   Personality Change in Personality and Character Deficiency   Other Psych Symptoms Normal   Constitutional Negative   ENT Negative   Cardiovascular Negative   Respiratory Negative   Gastrointestinal As Noted in HPI   Genitourinary Negative   Musculoskeletal Negative   Integumentary Negative   Neurological Negative   Endocrine Normal    Other Symptoms Normal              Laboratory Results: No results found for this or any previous visit      Substance Abuse History:  Social History     Substance and Sexual Activity   Drug Use No       Family Psychiatric History:   Family History   Problem Relation Age of Onset    Arthritis Mother     Hyperlipidemia Mother     Dementia Mother         started late 76s    Tinnitus Mother     Arthritis Family     Hyperlipidemia Family     Anxiety disorder Sister        The following portions of the patient's history were reviewed and updated as appropriate: allergies, current medications, past family history, past medical history, past social history, past surgical history and problem list     Social History     Socioeconomic History    Marital status: /Civil Union     Spouse name: Not on file    Number of children: 3    Years of education: 12+    Highest education level: Not on file   Occupational History    Occupation: retired   Social Needs    Financial resource strain: Somewhat hard   [address] insecurity:     Worry: Not on file     Inability: Not on file    Transportation needs:     Medical: Not on file     Non-medical: Not on file   Tobacco Use    Smoking status: Former Smoker    Smokeless tobacco: Never Used    Tobacco comment: quit 20 years ago   Substance and Sexual Activity    Alcohol use: Yes     Frequency: 2-3 times a week     Drinks per session: 1 or 2     Binge frequency: Never     Comment: BEING A SOCIAL DRINKER    Drug use: No    Sexual activity: Not on file   Lifestyle    Physical activity:     Days per week: Not on file     Minutes per session: Not on file    Stress: Not on file   Relationships    Social connections:     Talks on phone: Not on file     Gets together: Not on file     Attends Mandaeism service: Not on file     Active member of club or organization: Not on file     Attends meetings of clubs or organizations: Not on file     Relationship status: Not on file    Intimate partner violence:     Fear of current or ex partner: Not on file     Emotionally abused: Not on file     Physically abused: Not on file     Forced sexual activity: Not on file   Other Topics Concern    Not on file   Social History Narrative    Not on file     Social History     Social History Narrative    Not on file       Objective:       Mental status:  Appearance calm and cooperative , adequate hygiene and grooming and poor eye contact    Mood depressed   Affect affect was constricted   Speech slowed and sparse   Thought Processes poverty of thought was observed   Hallucinations no hallucinations present    Thought Content no delusions   Abnormal Thoughts no suicidal thoughts  and no homicidal thoughts    Orientation  oriented to person, oriented to place, oriented to time and not oriented to date  Remote Memory short term memory impaired and long term memory impaired   Attention Span concentration intact   Intellect Appears to be of Average Intelligence   Insight Limited insight   Judgement judgment was limited   Muscle Strength Muscle strength and tone were normal and Normal gait    Language no difficulty naming common objects and no difficulty repeating a phrase    Fund of Knowledge displays adequate knowledge of current events   Pain none   Pain Scale 0       Assessment/Plan:       Diagnoses and all orders for this visit:    Major depressive disorder, recurrent, moderate (HCC)  -     citalopram (CeleXA) 20 mg tablet; Take 1 5 tablets (30 mg total) by mouth daily    Mild cognitive impairment with memory loss  -     citalopram (CeleXA) 20 mg tablet; Take 1 5 tablets (30 mg total) by mouth daily          Follow up in 3 months  Treatment Recommendations- Risks Benefits      Immediate Medical/Psychiatric/Psychotherapy Treatments and Any Precautions: some improvement seen in motivation and concentration  Mood improved  No SI  Will increase to 30mg of celexa  Will be working on puzzles and continue with speech therapy      Risks, Benefits And Possible Side Effects Of Medications:  PSYCH RISK, BENEFITS AND POSSIBLE SIDE EFFECTS discussed with patient and his wife  Controlled Medication Discussion: No records found for controlled prescriptions according to [address] Monitoring Program       Psychotherapy Provided: Individual psychotherapy provided       Goals discussed in session: working on memory    Counseling provided: 10

## 2019-03-26 NOTE — PATIENT INSTRUCTIONS
Major depressive disorder, recurrent, moderate (HCC)  -     citalopram (CeleXA) 20 mg tablet; Take 1 5 tablets (30 mg total) by mouth daily    Mild cognitive impairment with memory loss  -     citalopram (CeleXA) 20 mg tablet;  Take 1 5 tablets (30 mg total) by mouth daily          Follow up in 3 months

## 2019-03-27 ENCOUNTER — OFFICE VISIT (OUTPATIENT)
Dept: SPEECH THERAPY | Facility: CLINIC | Age: 64
End: 2019-03-27
Payer: MEDICARE

## 2019-03-27 DIAGNOSIS — R41.3 MEMORY LOSS: ICD-10-CM

## 2019-03-27 DIAGNOSIS — G31.84 MILD COGNITIVE IMPAIRMENT WITH MEMORY LOSS: ICD-10-CM

## 2019-03-27 DIAGNOSIS — R48.8 OTHER SYMBOLIC DYSFUNCTIONS: Primary | ICD-10-CM

## 2019-03-27 PROCEDURE — 92507 TX SP LANG VOICE COMM INDIV: CPT | Performed by: SPEECH-LANGUAGE PATHOLOGIST

## 2019-04-01 ENCOUNTER — OFFICE VISIT (OUTPATIENT)
Dept: SPEECH THERAPY | Facility: CLINIC | Age: 64
End: 2019-04-01
Payer: MEDICARE

## 2019-04-01 DIAGNOSIS — G31.84 MILD COGNITIVE IMPAIRMENT WITH MEMORY LOSS: ICD-10-CM

## 2019-04-01 DIAGNOSIS — R48.8 OTHER SYMBOLIC DYSFUNCTIONS: Primary | ICD-10-CM

## 2019-04-01 DIAGNOSIS — R41.3 MEMORY LOSS: ICD-10-CM

## 2019-04-01 PROCEDURE — 92507 TX SP LANG VOICE COMM INDIV: CPT | Performed by: SPEECH-LANGUAGE PATHOLOGIST

## 2019-04-03 ENCOUNTER — APPOINTMENT (OUTPATIENT)
Dept: SPEECH THERAPY | Facility: CLINIC | Age: 64
End: 2019-04-03
Payer: MEDICARE

## 2019-04-03 ENCOUNTER — TELEPHONE (OUTPATIENT)
Dept: NEUROLOGY | Facility: CLINIC | Age: 64
End: 2019-04-03

## 2019-04-03 ENCOUNTER — OFFICE VISIT (OUTPATIENT)
Dept: NEUROLOGY | Facility: CLINIC | Age: 64
End: 2019-04-03
Payer: MEDICARE

## 2019-04-03 VITALS
BODY MASS INDEX: 26.68 KG/M2 | SYSTOLIC BLOOD PRESSURE: 110 MMHG | HEIGHT: 66 IN | DIASTOLIC BLOOD PRESSURE: 65 MMHG | HEART RATE: 75 BPM | WEIGHT: 166 LBS

## 2019-04-03 DIAGNOSIS — H93.13 TINNITUS OF BOTH EARS: ICD-10-CM

## 2019-04-03 DIAGNOSIS — G31.84 MILD COGNITIVE IMPAIRMENT WITH MEMORY LOSS: Primary | ICD-10-CM

## 2019-04-03 DIAGNOSIS — H90.2 CONDUCTIVE HEARING LOSS, UNSPECIFIED LATERALITY: ICD-10-CM

## 2019-04-03 PROCEDURE — 99215 OFFICE O/P EST HI 40 MIN: CPT | Performed by: PSYCHIATRY & NEUROLOGY

## 2019-04-08 ENCOUNTER — OFFICE VISIT (OUTPATIENT)
Dept: SPEECH THERAPY | Facility: CLINIC | Age: 64
End: 2019-04-08
Payer: MEDICARE

## 2019-04-08 DIAGNOSIS — R41.3 MEMORY LOSS: ICD-10-CM

## 2019-04-08 DIAGNOSIS — R48.8 OTHER SYMBOLIC DYSFUNCTIONS: Primary | ICD-10-CM

## 2019-04-08 DIAGNOSIS — G31.84 MILD COGNITIVE IMPAIRMENT WITH MEMORY LOSS: ICD-10-CM

## 2019-04-08 PROCEDURE — 92507 TX SP LANG VOICE COMM INDIV: CPT | Performed by: SPEECH-LANGUAGE PATHOLOGIST

## 2019-04-10 ENCOUNTER — OFFICE VISIT (OUTPATIENT)
Dept: SPEECH THERAPY | Facility: CLINIC | Age: 64
End: 2019-04-10
Payer: MEDICARE

## 2019-04-10 DIAGNOSIS — R41.3 MEMORY LOSS: ICD-10-CM

## 2019-04-10 DIAGNOSIS — R48.8 OTHER SYMBOLIC DYSFUNCTIONS: Primary | ICD-10-CM

## 2019-04-10 DIAGNOSIS — G31.84 MILD COGNITIVE IMPAIRMENT WITH MEMORY LOSS: ICD-10-CM

## 2019-04-10 PROCEDURE — 92507 TX SP LANG VOICE COMM INDIV: CPT | Performed by: SPEECH-LANGUAGE PATHOLOGIST

## 2019-04-15 ENCOUNTER — EVALUATION (OUTPATIENT)
Dept: SPEECH THERAPY | Facility: CLINIC | Age: 64
End: 2019-04-15
Payer: MEDICARE

## 2019-04-15 DIAGNOSIS — R41.3 MEMORY LOSS: ICD-10-CM

## 2019-04-15 DIAGNOSIS — R48.8 OTHER SYMBOLIC DYSFUNCTIONS: Primary | ICD-10-CM

## 2019-04-15 DIAGNOSIS — G31.84 MILD COGNITIVE IMPAIRMENT WITH MEMORY LOSS: ICD-10-CM

## 2019-04-15 PROCEDURE — 92507 TX SP LANG VOICE COMM INDIV: CPT | Performed by: SPEECH-LANGUAGE PATHOLOGIST

## 2019-04-17 ENCOUNTER — APPOINTMENT (OUTPATIENT)
Dept: SPEECH THERAPY | Facility: CLINIC | Age: 64
End: 2019-04-17
Payer: MEDICARE

## 2019-06-17 ENCOUNTER — APPOINTMENT (OUTPATIENT)
Dept: LAB | Facility: MEDICAL CENTER | Age: 64
End: 2019-06-17
Payer: MEDICARE

## 2019-06-17 DIAGNOSIS — C43.9 MALIGNANT MELANOMA OF SKIN (HCC): Chronic | ICD-10-CM

## 2019-06-17 DIAGNOSIS — E55.9 VITAMIN D DEFICIENCY: Chronic | ICD-10-CM

## 2019-06-17 DIAGNOSIS — E78.01 FAMILIAL HYPERCHOLESTEROLEMIA: Chronic | ICD-10-CM

## 2019-06-17 DIAGNOSIS — H90.2 CONDUCTIVE HEARING LOSS, UNSPECIFIED LATERALITY: ICD-10-CM

## 2019-06-17 DIAGNOSIS — M25.561 KNEE PAIN, RIGHT ANTERIOR: ICD-10-CM

## 2019-06-17 DIAGNOSIS — N40.0 BENIGN PROSTATIC HYPERPLASIA, UNSPECIFIED WHETHER LOWER URINARY TRACT SYMPTOMS PRESENT: Chronic | ICD-10-CM

## 2019-06-17 DIAGNOSIS — M53.3 SACRAL BACK PAIN: ICD-10-CM

## 2019-06-17 DIAGNOSIS — R41.3 MEMORY LOSS: Chronic | ICD-10-CM

## 2019-06-17 DIAGNOSIS — R79.9 ABNORMAL FINDING OF BLOOD CHEMISTRY: ICD-10-CM

## 2019-06-17 LAB
25(OH)D3 SERPL-MCNC: 34.3 NG/ML (ref 30–100)
ALBUMIN SERPL BCP-MCNC: 3.9 G/DL (ref 3.5–5)
ALP SERPL-CCNC: 96 U/L (ref 46–116)
ALT SERPL W P-5'-P-CCNC: 39 U/L (ref 12–78)
ANION GAP SERPL CALCULATED.3IONS-SCNC: 4 MMOL/L (ref 4–13)
AST SERPL W P-5'-P-CCNC: 26 U/L (ref 5–45)
BASOPHILS # BLD AUTO: 0.03 THOUSANDS/ΜL (ref 0–0.1)
BASOPHILS NFR BLD AUTO: 1 % (ref 0–1)
BILIRUB SERPL-MCNC: 0.41 MG/DL (ref 0.2–1)
BUN SERPL-MCNC: 19 MG/DL (ref 5–25)
CALCIUM SERPL-MCNC: 8.9 MG/DL (ref 8.3–10.1)
CHLORIDE SERPL-SCNC: 108 MMOL/L (ref 100–108)
CHOLEST SERPL-MCNC: 198 MG/DL (ref 50–200)
CO2 SERPL-SCNC: 30 MMOL/L (ref 21–32)
CREAT SERPL-MCNC: 0.98 MG/DL (ref 0.6–1.3)
EOSINOPHIL # BLD AUTO: 0.27 THOUSAND/ΜL (ref 0–0.61)
EOSINOPHIL NFR BLD AUTO: 6 % (ref 0–6)
ERYTHROCYTE [DISTWIDTH] IN BLOOD BY AUTOMATED COUNT: 13.1 % (ref 11.6–15.1)
EST. AVERAGE GLUCOSE BLD GHB EST-MCNC: 103 MG/DL
GFR SERPL CREATININE-BSD FRML MDRD: 80 ML/MIN/1.73SQ M
GLUCOSE P FAST SERPL-MCNC: 88 MG/DL (ref 65–99)
HBA1C MFR BLD: 5.2 % (ref 4.2–6.3)
HCT VFR BLD AUTO: 43.8 % (ref 36.5–49.3)
HDLC SERPL-MCNC: 68 MG/DL (ref 40–60)
HGB BLD-MCNC: 14.3 G/DL (ref 12–17)
IMM GRANULOCYTES # BLD AUTO: 0.01 THOUSAND/UL (ref 0–0.2)
IMM GRANULOCYTES NFR BLD AUTO: 0 % (ref 0–2)
LDLC SERPL CALC-MCNC: 121 MG/DL (ref 0–100)
LYMPHOCYTES # BLD AUTO: 1.11 THOUSANDS/ΜL (ref 0.6–4.47)
LYMPHOCYTES NFR BLD AUTO: 25 % (ref 14–44)
MCH RBC QN AUTO: 29.8 PG (ref 26.8–34.3)
MCHC RBC AUTO-ENTMCNC: 32.6 G/DL (ref 31.4–37.4)
MCV RBC AUTO: 91 FL (ref 82–98)
MONOCYTES # BLD AUTO: 0.78 THOUSAND/ΜL (ref 0.17–1.22)
MONOCYTES NFR BLD AUTO: 17 % (ref 4–12)
NEUTROPHILS # BLD AUTO: 2.29 THOUSANDS/ΜL (ref 1.85–7.62)
NEUTS SEG NFR BLD AUTO: 51 % (ref 43–75)
NRBC BLD AUTO-RTO: 0 /100 WBCS
PLATELET # BLD AUTO: 193 THOUSANDS/UL (ref 149–390)
PMV BLD AUTO: 10.2 FL (ref 8.9–12.7)
POTASSIUM SERPL-SCNC: 4.4 MMOL/L (ref 3.5–5.3)
PROT SERPL-MCNC: 6.8 G/DL (ref 6.4–8.2)
RBC # BLD AUTO: 4.8 MILLION/UL (ref 3.88–5.62)
SODIUM SERPL-SCNC: 142 MMOL/L (ref 136–145)
TRIGL SERPL-MCNC: 47 MG/DL
TSH SERPL DL<=0.05 MIU/L-ACNC: 1.82 UIU/ML (ref 0.36–3.74)
WBC # BLD AUTO: 4.49 THOUSAND/UL (ref 4.31–10.16)

## 2019-06-17 PROCEDURE — 84443 ASSAY THYROID STIM HORMONE: CPT

## 2019-06-17 PROCEDURE — 83036 HEMOGLOBIN GLYCOSYLATED A1C: CPT

## 2019-06-17 PROCEDURE — 82306 VITAMIN D 25 HYDROXY: CPT

## 2019-06-17 PROCEDURE — 84153 ASSAY OF PSA TOTAL: CPT

## 2019-06-17 PROCEDURE — 80053 COMPREHEN METABOLIC PANEL: CPT

## 2019-06-17 PROCEDURE — 84154 ASSAY OF PSA FREE: CPT

## 2019-06-17 PROCEDURE — 85025 COMPLETE CBC W/AUTO DIFF WBC: CPT

## 2019-06-17 PROCEDURE — 80061 LIPID PANEL: CPT

## 2019-06-17 PROCEDURE — 36415 COLL VENOUS BLD VENIPUNCTURE: CPT

## 2019-06-18 ENCOUNTER — OFFICE VISIT (OUTPATIENT)
Dept: INTERNAL MEDICINE CLINIC | Facility: CLINIC | Age: 64
End: 2019-06-18
Payer: MEDICARE

## 2019-06-18 VITALS
HEIGHT: 66 IN | TEMPERATURE: 98.4 F | RESPIRATION RATE: 16 BRPM | HEART RATE: 69 BPM | OXYGEN SATURATION: 96 % | DIASTOLIC BLOOD PRESSURE: 78 MMHG | SYSTOLIC BLOOD PRESSURE: 124 MMHG | WEIGHT: 160.8 LBS | BODY MASS INDEX: 25.84 KG/M2

## 2019-06-18 DIAGNOSIS — E78.01 FAMILIAL HYPERCHOLESTEROLEMIA: Chronic | ICD-10-CM

## 2019-06-18 DIAGNOSIS — M53.3 SACRAL BACK PAIN: ICD-10-CM

## 2019-06-18 DIAGNOSIS — G31.84 MILD COGNITIVE IMPAIRMENT WITH MEMORY LOSS: ICD-10-CM

## 2019-06-18 DIAGNOSIS — F33.1 MAJOR DEPRESSIVE DISORDER, RECURRENT, MODERATE (HCC): ICD-10-CM

## 2019-06-18 LAB
PSA FREE MFR SERPL: 30 %
PSA FREE SERPL-MCNC: 0.15 NG/ML
PSA SERPL-MCNC: 0.5 NG/ML (ref 0–4)

## 2019-06-18 PROCEDURE — G0438 PPPS, INITIAL VISIT: HCPCS | Performed by: INTERNAL MEDICINE

## 2019-06-18 RX ORDER — PRAVASTATIN SODIUM 40 MG
40 TABLET ORAL DAILY
Qty: 90 TABLET | Refills: 2 | Status: SHIPPED | OUTPATIENT
Start: 2019-06-18 | End: 2020-03-08

## 2019-06-18 RX ORDER — MIRTAZAPINE 7.5 MG/1
7.5 TABLET, FILM COATED ORAL
Qty: 30 TABLET | Refills: 0 | Status: SHIPPED | OUTPATIENT
Start: 2019-06-18 | End: 2019-07-10 | Stop reason: SDUPTHER

## 2019-06-18 RX ORDER — CITALOPRAM 20 MG/1
30 TABLET ORAL DAILY
Qty: 135 TABLET | Refills: 3 | Status: SHIPPED | OUTPATIENT
Start: 2019-06-18 | End: 2019-11-04

## 2019-06-18 RX ORDER — MELOXICAM 15 MG/1
15 TABLET ORAL DAILY PRN
Qty: 90 TABLET | Refills: 0 | Status: SHIPPED | OUTPATIENT
Start: 2019-06-18 | End: 2020-04-07 | Stop reason: ALTCHOICE

## 2019-07-10 DIAGNOSIS — F33.1 MAJOR DEPRESSIVE DISORDER, RECURRENT, MODERATE (HCC): ICD-10-CM

## 2019-07-11 RX ORDER — MIRTAZAPINE 7.5 MG/1
7.5 TABLET, FILM COATED ORAL
Qty: 30 TABLET | Refills: 0 | Status: SHIPPED | OUTPATIENT
Start: 2019-07-11 | End: 2019-11-04

## 2019-07-29 DIAGNOSIS — F33.1 MAJOR DEPRESSIVE DISORDER, RECURRENT, MODERATE (HCC): ICD-10-CM

## 2019-07-29 RX ORDER — MIRTAZAPINE 7.5 MG/1
7.5 TABLET, FILM COATED ORAL
Qty: 30 TABLET | Refills: 0 | Status: CANCELLED | OUTPATIENT
Start: 2019-07-29

## 2019-07-31 ENCOUNTER — TELEPHONE (OUTPATIENT)
Dept: INTERNAL MEDICINE CLINIC | Facility: CLINIC | Age: 64
End: 2019-07-31

## 2019-08-16 NOTE — TELEPHONE ENCOUNTER
mirtazapine (REMERON) 7 5 MG tablet [[id number]]     Order Details   Dose: 7 5 mg Route: Oral Frequency: Daily at bedtime   Dispense Quantity: 30 tablet Refills: 0

## 2019-10-09 ENCOUNTER — CLINICAL SUPPORT (OUTPATIENT)
Dept: INTERNAL MEDICINE CLINIC | Facility: CLINIC | Age: 64
End: 2019-10-09
Payer: MEDICARE

## 2019-10-09 DIAGNOSIS — Z23 NEEDS FLU SHOT: Primary | ICD-10-CM

## 2019-10-09 PROCEDURE — 90662 IIV NO PRSV INCREASED AG IM: CPT

## 2019-10-09 PROCEDURE — G0008 ADMIN INFLUENZA VIRUS VAC: HCPCS

## 2019-11-01 ENCOUNTER — APPOINTMENT (OUTPATIENT)
Dept: LAB | Facility: MEDICAL CENTER | Age: 64
End: 2019-11-01
Payer: MEDICARE

## 2019-11-01 DIAGNOSIS — E78.01 FAMILIAL HYPERCHOLESTEROLEMIA: Chronic | ICD-10-CM

## 2019-11-01 DIAGNOSIS — N40.0 BENIGN PROSTATIC HYPERPLASIA, UNSPECIFIED WHETHER LOWER URINARY TRACT SYMPTOMS PRESENT: Chronic | ICD-10-CM

## 2019-11-01 LAB
ALBUMIN SERPL BCP-MCNC: 3.9 G/DL (ref 3.5–5)
ALP SERPL-CCNC: 85 U/L (ref 46–116)
ALT SERPL W P-5'-P-CCNC: 31 U/L (ref 12–78)
ANION GAP SERPL CALCULATED.3IONS-SCNC: 3 MMOL/L (ref 4–13)
AST SERPL W P-5'-P-CCNC: 22 U/L (ref 5–45)
BILIRUB SERPL-MCNC: 0.47 MG/DL (ref 0.2–1)
BUN SERPL-MCNC: 19 MG/DL (ref 5–25)
CALCIUM SERPL-MCNC: 9.2 MG/DL (ref 8.3–10.1)
CHLORIDE SERPL-SCNC: 106 MMOL/L (ref 100–108)
CHOLEST SERPL-MCNC: 189 MG/DL (ref 50–200)
CO2 SERPL-SCNC: 29 MMOL/L (ref 21–32)
CREAT SERPL-MCNC: 0.95 MG/DL (ref 0.6–1.3)
GFR SERPL CREATININE-BSD FRML MDRD: 83 ML/MIN/1.73SQ M
GLUCOSE P FAST SERPL-MCNC: 95 MG/DL (ref 65–99)
HDLC SERPL-MCNC: 66 MG/DL
LDLC SERPL CALC-MCNC: 111 MG/DL (ref 0–100)
POTASSIUM SERPL-SCNC: 4 MMOL/L (ref 3.5–5.3)
PROT SERPL-MCNC: 7 G/DL (ref 6.4–8.2)
PSA SERPL-MCNC: 0.6 NG/ML (ref 0–4)
SODIUM SERPL-SCNC: 138 MMOL/L (ref 136–145)
TRIGL SERPL-MCNC: 58 MG/DL

## 2019-11-01 PROCEDURE — 80053 COMPREHEN METABOLIC PANEL: CPT

## 2019-11-01 PROCEDURE — 80061 LIPID PANEL: CPT

## 2019-11-01 PROCEDURE — 84153 ASSAY OF PSA TOTAL: CPT

## 2019-11-01 PROCEDURE — 36415 COLL VENOUS BLD VENIPUNCTURE: CPT

## 2019-11-04 ENCOUNTER — OFFICE VISIT (OUTPATIENT)
Dept: INTERNAL MEDICINE CLINIC | Facility: CLINIC | Age: 64
End: 2019-11-04
Payer: MEDICARE

## 2019-11-04 ENCOUNTER — TELEPHONE (OUTPATIENT)
Dept: INTERNAL MEDICINE CLINIC | Facility: CLINIC | Age: 64
End: 2019-11-04

## 2019-11-04 VITALS
SYSTOLIC BLOOD PRESSURE: 122 MMHG | BODY MASS INDEX: 26.37 KG/M2 | WEIGHT: 163.4 LBS | DIASTOLIC BLOOD PRESSURE: 78 MMHG | HEART RATE: 87 BPM | OXYGEN SATURATION: 96 %

## 2019-11-04 DIAGNOSIS — E78.01 FAMILIAL HYPERCHOLESTEROLEMIA: Chronic | ICD-10-CM

## 2019-11-04 DIAGNOSIS — G31.84 MILD COGNITIVE IMPAIRMENT WITH MEMORY LOSS: Primary | ICD-10-CM

## 2019-11-04 DIAGNOSIS — M54.12 CERVICAL RADICULOPATHY: ICD-10-CM

## 2019-11-04 DIAGNOSIS — G47.33 OBSTRUCTIVE SLEEP APNEA: ICD-10-CM

## 2019-11-04 DIAGNOSIS — F33.1 MAJOR DEPRESSIVE DISORDER, RECURRENT, MODERATE (HCC): ICD-10-CM

## 2019-11-04 DIAGNOSIS — F41.8 DEPRESSION WITH ANXIETY: Chronic | ICD-10-CM

## 2019-11-04 DIAGNOSIS — M54.16 LUMBAR RADICULOPATHY: ICD-10-CM

## 2019-11-04 PROCEDURE — 99215 OFFICE O/P EST HI 40 MIN: CPT | Performed by: INTERNAL MEDICINE

## 2019-11-04 RX ORDER — CITALOPRAM 40 MG/1
40 TABLET ORAL DAILY
Qty: 90 TABLET | Refills: 0
Start: 2019-11-04 | End: 2019-12-24 | Stop reason: SDUPTHER

## 2019-11-04 NOTE — TELEPHONE ENCOUNTER
I think he needs to see sleep medicine first to see what CPAP machine he has and if they need to do anything differently

## 2019-11-04 NOTE — PROGRESS NOTES
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  -remains on celexa,   -Depression Screening Follow-up Plan: Patient's depression screening was positive with a PHQ-2 score of 2  Their PHQ-9 score was 5  Patient advised to follow-up with PCP for further management    -increase celexa to 40mg daily    #Hearing Loss  -wearing hearing aides but not as effecive    # anxiety and depression  -increase celexa    # bilateral lower groin pain  -previous history of hernia surgery  -US imaging was benign and was evaluated by surgery however stable  -pain likely from back  -will obtain MRI imaging  -reports tingling down lumbar spine  -straight leg test positive in R[facility]    # hyperlipidemia  - and HDL 66 controlled on pravastatin    # previous history of lumbar disc herniation  -continues to report lumbar and sacral back pain  -has tried meloxicam without relief  -reflexes remain diminished +1 patella on right and +2 on left  -reports tingling down right leg  -will obtain MRI imaging    # obstructive sleep apnea  -refer to sleep specialist for mask fitting and titration since patient is not wearing his mask    #Cervical Radiculopathy  -reports tingling down arms especially right side  -reports occasional weakness and difficulty with gripping  -muscle strength intact today however  -has taken meloxicam without relief  -symptoms present for over 6 weeks    # health maintenance  -routine labs and follow-up in 6 months  -colonoscopy completed in 2014 with repeat in 2020  -flu vaccine administered 2019  -Prevnar 13 administered 2018  -Pneumovax 23 given to patient 2014    BMI Counseling: Body mass index is 26 37 kg/m²  The BMI is above normal  Nutrition recommendations include decreasing overall calorie intake and 3-5 servings of fruits/vegetables daily   Exercise recommendations include vigorous aerobic physical activity for 75 minutes/week  Addendum 11/11/2019 patient completed MRI cervical spine revealing C3 to see for moderate left foraminal stenosis with C5-C6 moderate right foraminal stenosis with C7-T1 mild degenerative spondylolysis Los is grade 1 anterolisthesis  Lumbar spine MRI revealing degenerative disc disease with small right foraminal disc protrusion at L4-L5 with mild canal stenosis and foraminal narrowing  Addendum 11/20/19 patient reports increaasing anxiety  Will start on lorazepam     Addendum 04/08/2020 patient was seen by Neurology and will start on Aricept for dementia      No problem-specific Assessment & Plan notes found for this encounter  Diagnoses and all orders for this visit:    Mild cognitive impairment with memory loss  -     Comprehensive metabolic panel; Future  -     Lipid Panel with Direct LDL reflex; Future  -     citalopram (CeleXA) 40 mg tablet; Take 1 tablet (40 mg total) by mouth daily    Depression with anxiety  -     Comprehensive metabolic panel; Future  -     Lipid Panel with Direct LDL reflex; Future    Familial hypercholesterolemia  -     Comprehensive metabolic panel; Future  -     Lipid Panel with Direct LDL reflex; Future    Obstructive sleep apnea  -     Comprehensive metabolic panel; Future  -     Lipid Panel with Direct LDL reflex; Future  -     Cancel: Ambulatory referral to Sleep Medicine; Future  -     Ambulatory referral to Sleep Medicine; Future    Major depressive disorder, recurrent, moderate (HCC)  -     Comprehensive metabolic panel; Future  -     Lipid Panel with Direct LDL reflex; Future  -     citalopram (CeleXA) 40 mg tablet; Take 1 tablet (40 mg total) by mouth daily    Cervical radiculopathy  -     Comprehensive metabolic panel; Future  -     Lipid Panel with Direct LDL reflex; Future  -     MRI cervical spine wo contrast; Future    Lumbar radiculopathy  -     Comprehensive metabolic panel; Future  -     Lipid Panel with Direct LDL reflex;  Future  - MRI lumbar spine wo contrast; Future            Current Outpatient Medications:     aspirin 81 MG tablet, Take 1 tablet by mouth daily, Disp: , Rfl:     citalopram (CeleXA) 40 mg tablet, Take 1 tablet (40 mg total) by mouth daily, Disp: 90 tablet, Rfl: 0    Flaxseed, Linseed, (FLAX SEED OIL) 1300 MG CAPS, Take by mouth, Disp: , Rfl:     fluticasone (FLONASE) 50 mcg/act nasal spray, 2 sprays into each nostril as needed  , Disp: , Rfl:     meloxicam (MOBIC) 15 mg tablet, Take 1 tablet (15 mg total) by mouth daily as needed for moderate pain for up to 90 days, Disp: 90 tablet, Rfl: 0    Multiple Vitamin ([employer] DAILY MULTIPLE PO), Take by mouth, Disp: , Rfl:     pravastatin (PRAVACHOL) 40 mg tablet, Take 1 tablet (40 mg total) by mouth daily for 90 days, Disp: 90 tablet, Rfl: 2    Subjective:      Patient ID: Naya Branch is a 75 y o  male  HPI     Patient presents for routine visit  Denies any recent hospitalizations or surgeries  His wife presented with him today and is concerned regarding his memory loss  She states that is progressively over the same and does not appear to be improved  He remains on 30 mg Celexa daily and stated may be help before he got short  We had him perform PHQ-9 surgery today and the score was 5  We also did a mini-mental status exam and he scored 24/30 which was decreased from ears previous 26  Previously saw neuropsychiatry sings that it may have been soon to dementia underlining manifestation of depression  Patient is depressed however we encouraged him to interact with others and get back to doing the things that he enjoys  Patient also notes a history of cervical neck pain with radiation and radiculopathy down his arms  He states that whenever he turns his neck there is clicking as well as the discomfort  He states that it comes up periodically without any warning and usually he has stretched to give himself relief    We will obtain MRIs of the cervical spine as well as as lumbar spine  Lumbar spine wise he states that continues to have lumbar and sacral back pain due to the history of lumbar disc herniation  He has been taking meloxicam however did not provide him any relief  His reflexes on the right patellar are decreased compared to his left patella  Muscle strength and sensation were intact however he was unable to perform a straight leg raise on his right leg  Patient also has a history of obstructive sleep apnea and for which she is on a CPAP which she has not been using in many years  We will refer him for repeat sleep study to evaluate for better mask fitting as well as titration of his settings  He will return to care in 6 months  Review of Systems   Constitutional: Negative for activity change, appetite change, fatigue and fever  HENT: Positive for hearing loss (hearing aides)  Negative for congestion, ear pain, sore throat and tinnitus  Eyes: Negative for photophobia, pain and visual disturbance  Respiratory: Negative for cough, shortness of breath and wheezing  Cardiovascular: Negative for chest pain and leg swelling  Gastrointestinal: Negative for abdominal distention, abdominal pain, nausea and vomiting  Genitourinary: Negative for difficulty urinating, frequency and hematuria  Musculoskeletal: Positive for back pain (lumbar) and neck pain  Negative for arthralgias, gait problem, joint swelling, myalgias and neck stiffness  Skin: Negative for color change, pallor, rash and wound  Neurological: Positive for weakness (hands)  Negative for dizziness, tremors, numbness and headaches  Hematological: Does not bruise/bleed easily  Psychiatric/Behavioral: Positive for decreased concentration ( forgetfulness) and dysphoric mood (mild)  Negative for self-injury, sleep disturbance and suicidal ideas  The patient is nervous/anxious (nervous about doing anything by himself)            Objective:      /78 (BP Location: Left arm, Patient Position: Sitting, Cuff Size: Adult)   Pulse 87   Wt 74 1 kg (163 lb 6 4 oz)   SpO2 96%   BMI 26 37 kg/m²          Physical Exam   Constitutional: He is oriented to person, place, and time  He appears well-developed and well-nourished  HENT:   Head: Normocephalic and atraumatic  Right Ear: External ear normal    Left Ear: External ear normal    Nose: Nose normal    Mouth/Throat: Oropharynx is clear and moist    Eyes: Pupils are equal, round, and reactive to light  Conjunctivae and EOM are normal    Neck: Normal range of motion  Neck supple  No JVD present  No thyromegaly present  Cardiovascular: Normal rate, regular rhythm, normal heart sounds and intact distal pulses  No murmur heard  Pulmonary/Chest: Effort normal and breath sounds normal  No respiratory distress  He has no wheezes  He has no rales  He exhibits no tenderness  Abdominal: Soft  Bowel sounds are normal  He exhibits no distension and no mass  There is no tenderness  There is no rebound and no guarding  Musculoskeletal: Normal range of motion  He exhibits tenderness (lumbar and cervical)  He exhibits no edema or deformity  Lymphadenopathy:     He has no cervical adenopathy  Neurological: He is alert and oriented to person, place, and time  He displays abnormal reflex ( +1 patellar reflex right knee)  No cranial nerve deficit  Skin: Skin is warm and dry  No rash noted  No erythema  No pallor  Vitals reviewed

## 2019-11-04 NOTE — TELEPHONE ENCOUNTER
PATIENT ASKED ABOUT A SLEEP STUDY, I SAW IN THE SYSTEM THAT YOU PUT IN A REFERRAL FOR SLEEP MEDICINE  I THINK PATIENT WIFE MAY BE CONFUSED ABOUT WHAT YOU WANT     WOULD YOU LIKE HIM TO SEE SLEEP MEDICINE OR DOES HE NEED A SLEEP STUDY FIRST

## 2019-11-08 ENCOUNTER — HOSPITAL ENCOUNTER (OUTPATIENT)
Dept: RADIOLOGY | Facility: IMAGING CENTER | Age: 64
Discharge: HOME | End: 2019-11-08
Payer: MEDICARE

## 2019-11-08 DIAGNOSIS — M54.16 LUMBAR RADICULOPATHY: ICD-10-CM

## 2019-11-08 DIAGNOSIS — M54.12 CERVICAL RADICULOPATHY: ICD-10-CM

## 2019-11-08 PROCEDURE — 72141 MRI NECK SPINE W/O DYE: CPT

## 2019-11-08 PROCEDURE — 72148 MRI LUMBAR SPINE W/O DYE: CPT

## 2019-11-12 ENCOUNTER — TELEPHONE (OUTPATIENT)
Dept: INTERNAL MEDICINE CLINIC | Facility: CLINIC | Age: 64
End: 2019-11-12

## 2019-11-20 ENCOUNTER — TELEPHONE (OUTPATIENT)
Dept: INTERNAL MEDICINE CLINIC | Facility: CLINIC | Age: 64
End: 2019-11-20

## 2019-11-20 RX ORDER — LORAZEPAM 0.5 MG/1
0.5 TABLET ORAL EVERY 8 HOURS PRN
Qty: 45 TABLET | Refills: 0 | Status: SHIPPED | OUTPATIENT
Start: 2019-11-20 | End: 2020-09-14 | Stop reason: ALTCHOICE

## 2019-11-20 NOTE — TELEPHONE ENCOUNTER
PT'S WIFE STATED THAT SINCE THIS PAST Sunday LYN'S ANXIETY HAS BEEN WORSE  SHE STATED THAT THEY DROVE THE CAMPER TO [place] AND ARE DOWN THERE UNTIL 12/17 BUT THAT "HE'S HAVING A HARD TIME AND IS WORRIED ABOUT THINGS GOING TO HAPPEN"  SHE STATED THAT HE IS TAKING THE CITALOPRAM 40MG (2 TABS) IN THE MORNING BUT BEFORE HE TAKES IT, THE ANXIETY IS THE WORST BUT HE DOES FEEL BETTER AFTER HE TAKES IT  SHE IS ASKING IF HE CAN TAKE ANOTHER MEDICATION FOR HIS ANXIETY  SHE ALSO WANTED TO LET YOU KNOW THAT HE IS USING THE CBD OIL  [phone]    SHE KNOWS THAT SHE MIGHT NOT HEAR BACK FROM ME UNTIL TOMORROW WHEN Neeraj 64 BACK IN THE OFFICE   PLEASE ADVISE

## 2019-11-20 NOTE — TELEPHONE ENCOUNTER
Please let him know to continue with citalopram however I did send in for lorazepam to take in addition as needed for anxiety to [place]

## 2019-12-19 ENCOUNTER — OFFICE VISIT (OUTPATIENT)
Dept: SLEEP CENTER | Facility: CLINIC | Age: 64
End: 2019-12-19
Payer: MEDICARE

## 2019-12-19 ENCOUNTER — HOSPITAL ENCOUNTER (OUTPATIENT)
Dept: SLEEP CENTER | Facility: CLINIC | Age: 64
Discharge: HOME | End: 2019-12-19
Payer: MEDICARE

## 2019-12-19 VITALS
SYSTOLIC BLOOD PRESSURE: 110 MMHG | BODY MASS INDEX: 24.4 KG/M2 | HEIGHT: 68 IN | WEIGHT: 161 LBS | DIASTOLIC BLOOD PRESSURE: 60 MMHG

## 2019-12-19 DIAGNOSIS — F51.04 PSYCHOPHYSIOLOGICAL INSOMNIA: ICD-10-CM

## 2019-12-19 DIAGNOSIS — G47.33 OSA (OBSTRUCTIVE SLEEP APNEA): Primary | ICD-10-CM

## 2019-12-19 DIAGNOSIS — G47.33 OSA (OBSTRUCTIVE SLEEP APNEA): ICD-10-CM

## 2019-12-19 PROCEDURE — 95810 POLYSOM 6/> YRS 4/> PARAM: CPT

## 2019-12-19 PROCEDURE — 99213 OFFICE O/P EST LOW 20 MIN: CPT | Performed by: INTERNAL MEDICINE

## 2019-12-19 RX ORDER — ZOLPIDEM TARTRATE 10 MG/1
TABLET ORAL
Qty: 30 TABLET | Refills: 0 | Status: SHIPPED | OUTPATIENT
Start: 2019-12-19 | End: 2020-04-07 | Stop reason: ALTCHOICE

## 2019-12-19 NOTE — PROGRESS NOTES
Consultation - Sleep Center   iNsha Arreola : 1955  MRN: [id number]      Assessment:  The patient has a prior history of obstructive sleep apnea, but was unable to tolerate CPAP  Testing was performed approximately 20 years ago and he has not been treated since  He still has snoring and excessive daytime sleepiness  There is a suspicion that his obstructive sleep apnea may be playing a role in terms of his forgetfulness  There is a significant likelihood that depression is responsible  Plan:  Diagnostic polysomnography    Follow up: After testing    History of Present Illness:   75 y o male with a history of obstructive sleep apnea diagnosed approximately 20 years ago, who was unable to tolerate positive airway pressure therapy  Since that time, his DREW has been untreated  He has had worsening daytime sleepiness and has been falling asleep inappropriately when sedentary  He denies awakening with a choking or gasping sensation  His wife has noticed snoring but no apneas  The patient's wife reports frequent periodic limb movements during the night  The patient has had memory loss, but neuropsych evaluation has not been consistent with dementia          Review of Systems      Genitourinary none   Cardiology none   Gastrointestinal none   Neurology forgetfulness, poor concentration or confusion,  and difficulty with memory   Constitutional fatigue   Integumentary none   Psychiatry anxiety, depression, aggressiveness or irritability and mood change   Musculoskeletal back pain and legs twitching/jerking   Pulmonary snoring   ENT throat clearing and ringing in ears   Endocrine excessive thirst   Hematological none         I have reviewed and updated the review of systems as necessary    Historical Information    Past Medical History:  Cognitive impairment, depression, anxiety    Family History: non-contributory    Social History     Socioeconomic History    Marital status: /Civil Union Spouse name: None    Number of children: 1    Years of education: 12+    Highest education level: None   Occupational History    Occupation: retired   Social Needs    Financial resource strain: Somewhat hard   [place] insecurity:     Worry: None     Inability: None    Transportation needs:     Medical: None     Non-medical: None   Tobacco Use    Smoking status: Former Smoker    Smokeless tobacco: Never Used    Tobacco comment: quit 20 years ago   Substance and Sexual Activity    Alcohol use: Yes     Frequency: 2-3 times a week     Drinks per session: 1 or 2     Binge frequency: Never     Comment: BEING A SOCIAL DRINKER    Drug use: No    Sexual activity: None   Lifestyle    Physical activity:     Days per week: None     Minutes per session: None    Stress: None   Relationships    Social connections:     Talks on phone: None     Gets together: None     Attends [employer] service: None     Active member of club or organization: None     Attends meetings of clubs or organizations: None     Relationship status: None    Intimate partner violence:     Fear of current or ex partner: None     Emotionally abused: None     Physically abused: None     Forced sexual activity: None   Other Topics Concern    None   Social History Narrative    None         Sleep Schedule: unremarkable    Snoring:  Yes    Witnessed Apnea:  No    Medications/Allergies:    Current Outpatient Medications:     aspirin 81 MG tablet, Take 1 tablet by mouth daily, Disp: , Rfl:     citalopram (CeleXA) 40 mg tablet, Take 1 tablet (40 mg total) by mouth daily, Disp: 90 tablet, Rfl: 0    Flaxseed, Linseed, (FLAX SEED OIL) 1300 MG CAPS, Take by mouth, Disp: , Rfl:     fluticasone (FLONASE) 50 mcg/act nasal spray, 2 sprays into each nostril as needed  , Disp: , Rfl:     LORazepam (ATIVAN) 0 5 mg tablet, Take 1 tablet (0 5 mg total) by mouth every 8 (eight) hours as needed for anxiety, Disp: 45 tablet, Rfl: 0    meloxicam (MOBIC) 15 mg tablet, Take 1 tablet (15 mg total) by mouth daily as needed for moderate pain for up to 90 days, Disp: 90 tablet, Rfl: 0    Multiple Vitamin (CVS DAILY MULTIPLE PO), Take by mouth, Disp: , Rfl:     pravastatin (PRAVACHOL) 40 mg tablet, Take 1 tablet (40 mg total) by mouth daily for 90 days, Disp: 90 tablet, Rfl: 2        No notes on file                  Objective:    Vital Signs:   Vitals:    12/19/19 1000   BP: 110/60   Weight: 73 kg (161 lb)   Height: 5' 8" (1 727 m)     Neck Circumference: 15      Jasper Sleepiness Scale: Total score: 14    Physical Exam:    General: Alert, appropriate, cooperative, normal build    Head: NC/AT, no retrognathia    Nose: No septal deviation, nares partial obstructed, mucosa normal    Throat: Airway diminished, tongue base thickened, no tonsils visualized    Neck: Normal, no thyromegaly or lymphadenopathy, no JVD    Heart: RR, normal S1 and S2, no murmurs    Chest: Clear bilaterally    Extremity: No clubbing, cyanosis, no edema    Skin: Warm, dry    Neuro: No motor abnormalities, cranial nerves appear intact    Sleep Study Results:   Unavailable      Counseling / Coordination of Care  A description of the counseling / coordination of care: We discussed the possible causes of his cognitive dysfunction  Further recommendations will be made when results of his sleep study are available  Board Certified Sleep Specialist    Portions of the record may have been created with voice recognition software  Occasional wrong word or "sound a like" substitutions may have occurred due to the inherent limitations of voice recognition software  Read the chart carefully and recognize, using context, where substitutions have occurred

## 2019-12-20 NOTE — PROGRESS NOTES
Sleep Study Documentation    Pre-Sleep Study       Sleep testing procedure explained to patient:YES    Patient napped prior to study:NO    Caffeine:Dayshift worker after 12PM   Caffeine use:YES- coffee  6 ounces    Alcohol:Dayshift workers after 5PM: Alcohol use:NO    Typical day for patient:YES       Study Documentation    Sleep Study Indications: snoring, EDS    Sleep Study: Diagnostic   Snore: Moderate  Supplemental O2: no    O2 flow rate (L/min) range n/a  O2 flow rate (L/min) final n/a  Minimum SaO2 81%  Baseline SaO2 93%        Mode of Therapy: n/a    EKG abnormalities: no     EEG abnormalities: no    Sleep Study Recorded < 2 hours: N/A    Sleep Study Recorded > 2 hours but incomplete study: N/A    Sleep Study Recorded 6 hours but no sleep obtained: NO    Patient classification: retired       Post-Sleep Study    Medication used at bedtime or during sleep study:NO    Patient reports time it took to fall asleep:30 to 60 minutes    Patient reports waking up during study:1 to 2 times  Patient reports returning to sleep without difficulty  Patient reports sleeping 6 to 8 hours without dreaming  Patient reports sleep during study:better than usual    Patient rated sleepiness: Very sleepy or tired    PAP treatment:no

## 2019-12-23 DIAGNOSIS — G47.33 OSA (OBSTRUCTIVE SLEEP APNEA): Primary | ICD-10-CM

## 2019-12-24 ENCOUNTER — OFFICE VISIT (OUTPATIENT)
Dept: URGENT CARE | Facility: MEDICAL CENTER | Age: 64
End: 2019-12-24
Payer: MEDICARE

## 2019-12-24 VITALS
WEIGHT: 158.6 LBS | DIASTOLIC BLOOD PRESSURE: 70 MMHG | BODY MASS INDEX: 24.04 KG/M2 | SYSTOLIC BLOOD PRESSURE: 106 MMHG | TEMPERATURE: 98.2 F | HEIGHT: 68 IN | OXYGEN SATURATION: 97 % | HEART RATE: 94 BPM | RESPIRATION RATE: 18 BRPM

## 2019-12-24 DIAGNOSIS — J06.9 ACUTE URI: Primary | ICD-10-CM

## 2019-12-24 DIAGNOSIS — G31.84 MILD COGNITIVE IMPAIRMENT WITH MEMORY LOSS: ICD-10-CM

## 2019-12-24 DIAGNOSIS — F33.1 MAJOR DEPRESSIVE DISORDER, RECURRENT, MODERATE (HCC): ICD-10-CM

## 2019-12-24 PROCEDURE — G0463 HOSPITAL OUTPT CLINIC VISIT: HCPCS | Performed by: PHYSICIAN ASSISTANT

## 2019-12-24 PROCEDURE — 99213 OFFICE O/P EST LOW 20 MIN: CPT | Performed by: PHYSICIAN ASSISTANT

## 2019-12-24 RX ORDER — BROMPHENIRAMINE MALEATE, PSEUDOEPHEDRINE HYDROCHLORIDE, AND DEXTROMETHORPHAN HYDROBROMIDE 2; 30; 10 MG/5ML; MG/5ML; MG/5ML
5 SYRUP ORAL 4 TIMES DAILY PRN
Qty: 120 ML | Refills: 0 | Status: SHIPPED | OUTPATIENT
Start: 2019-12-24 | End: 2019-12-29

## 2019-12-24 RX ORDER — CITALOPRAM 40 MG/1
40 TABLET ORAL DAILY
Qty: 90 TABLET | Refills: 0 | Status: SHIPPED | OUTPATIENT
Start: 2019-12-24 | End: 2020-04-01 | Stop reason: SDUPTHER

## 2019-12-24 NOTE — PATIENT INSTRUCTIONS
Upper respiratory infection  Steam from shower  Humidifier in room  bromfed as directed for cough  Follow up with PCP in 3-5 days  Proceed to  ER if symptoms worsen  Pharyngitis   WHAT YOU NEED TO KNOW:   Pharyngitis, or sore throat, is inflammation of the tissues and structures in your pharynx (throat)  Pharyngitis is most often caused by bacteria  It may also be caused by a cold or flu virus  Other causes include smoking, allergies, or acid reflux  DISCHARGE INSTRUCTIONS:   Call 911 for any of the following:   · You have trouble breathing or swallowing because your throat is swollen or sore  Return to the emergency department if:   · You are drooling because it hurts too much to swallow  · Your fever is higher than 102? F (39?C) or lasts longer than 3 days  · You are confused  · You taste blood in your throat  Contact your healthcare provider if:   · Your throat pain gets worse  · You have a painful lump in your throat that does not go away after 5 days  · Your symptoms do not improve after 5 days  · You have questions or concerns about your condition or care  Medicines:  Viral pharyngitis will go away on its own without treatment  Your sore throat should start to feel better in 3 to 5 days for both viral and bacterial infections  You may need any of the following:  · Antibiotics  treat a bacterial infection  · NSAIDs , such as ibuprofen, help decrease swelling, pain, and fever  NSAIDs can cause stomach bleeding or kidney problems in certain people  If you take blood thinner medicine, always ask your healthcare provider if NSAIDs are safe for you  Always read the medicine label and follow directions  · Acetaminophen  decreases pain and fever  It is available without a doctor's order  Ask how much to take and how often to take it  Follow directions  Acetaminophen can cause liver damage if not taken correctly  · Take your medicine as directed    Contact your healthcare provider if you think your medicine is not helping or if you have side effects  Tell him or her if you are allergic to any medicine  Keep a list of the medicines, vitamins, and herbs you take  Include the amounts, and when and why you take them  Bring the list or the pill bottles to follow-up visits  Carry your medicine list with you in case of an emergency  Manage your symptoms:   · Gargle salt water  Mix ¼ teaspoon salt in an 8 ounce glass of warm water and gargle  This may help decrease swelling in your throat  · Drink liquids as directed  You may need to drink more liquids than usual  Liquids may help soothe your throat and prevent dehydration  Ask how much liquid to drink each day and which liquids are best for you  · Use a cool-steam humidifier  to help moisten the air in your room and calm your cough  · Soothe your throat  with cough drops, ice, soft foods, or popsicles  Prevent the spread of pharyngitis:  Cover your mouth and nose when you cough or sneeze  Do not share food or drinks  Wash your hands often  Use soap and water  If soap and water are unavailable, use an alcohol based hand   Follow up with your healthcare provider as directed:  Write down your questions so you remember to ask them during your visits  © 2017 [employer] Information is for End User's use only and may not be sold, redistributed or otherwise used for commercial purposes  All illustrations and images included in CareNotes® are the copyrighted property of [employer]  or [employer]  The above information is an  only  It is not intended as medical advice for individual conditions or treatments  Talk to your doctor, nurse or pharmacist before following any medical regimen to see if it is safe and effective for you

## 2019-12-24 NOTE — PROGRESS NOTES
[facility]        NAME: Verdene Boeck is a 75 y o  male  : 1955    MRN: [id number]  DATE: 2019  TIME: 8:32 AM    Assessment and Plan   Acute URI [J06 9]  1  Acute URI  brompheniramine-pseudoephedrine-DM 30-2-10 MG/5ML syrup         Patient Instructions     Upper respiratory infection  Steam from shower  Humidifier in room  bromfed as directed for cough  Follow up with PCP in 3-5 days  Proceed to  ER if symptoms worsen  Chief Complaint     Chief Complaint   Patient presents with    Cough     Symptoms beninning yesterday afternoon   Nasal Congestion         History of Present Illness       76 y/o male presents c/o cough productive of white sputum associated with low grade fever which started yesterday  Wife states she was concerned because 3 years ago he had pneumonia  Patient denies chest pain, SOB, nausea, vomiting, diaphoresis      Review of Systems   Review of Systems   Constitutional: Positive for fever  HENT: Positive for congestion  Negative for dental problem, drooling, ear pain, postnasal drip, rhinorrhea, sinus pain, sore throat, trouble swallowing and voice change  Eyes: Negative  Respiratory: Positive for cough  Negative for apnea, choking, chest tightness, shortness of breath, wheezing and stridor  Cardiovascular: Negative  Negative for chest pain           Current Medications       Current Outpatient Medications:     aspirin 81 MG tablet, Take 1 tablet by mouth daily, Disp: , Rfl:     citalopram (CeleXA) 40 mg tablet, Take 1 tablet (40 mg total) by mouth daily (Patient taking differently: Take 40 mg by mouth daily ), Disp: 90 tablet, Rfl: 0    Flaxseed, Linseed, (FLAX SEED OIL) 1300 MG CAPS, Take by mouth, Disp: , Rfl:     Multiple Vitamin (CVS DAILY MULTIPLE PO), Take by mouth, Disp: , Rfl:     pravastatin (PRAVACHOL) 40 mg tablet, Take 1 tablet (40 mg total) by mouth daily for 90 days, Disp: 90 tablet, Rfl: 2   brompheniramine-pseudoephedrine-DM 30-2-10 MG/5ML syrup, Take 5 mL by mouth 4 (four) times a day as needed for congestion or cough for up to 5 days, Disp: 120 mL, Rfl: 0    fluticasone (FLONASE) 50 mcg/act nasal spray, 2 sprays into each nostril as needed  , Disp: , Rfl:     LORazepam (ATIVAN) 0 5 mg tablet, Take 1 tablet (0 5 mg total) by mouth every 8 (eight) hours as needed for anxiety, Disp: 45 tablet, Rfl: 0    meloxicam (MOBIC) 15 mg tablet, Take 1 tablet (15 mg total) by mouth daily as needed for moderate pain for up to 90 days, Disp: 90 tablet, Rfl: 0    zolpidem (AMBIEN) 10 mg tablet, Take 1/2 tablet at time of sleep    Take the other 1/2 in 1 hour if the patient is still awake (Patient not taking: Reported on 12/24/2019), Disp: 30 tablet, Rfl: 0    Current Allergies     Allergies as of 12/24/2019 - Reviewed 12/24/2019   Allergen Reaction Noted    Fish oil + d3 [fish oil-cholecalciferol] Rash 02/27/2014    Oseltamivir Rash 02/27/2014            The following portions of the patient's history were reviewed and updated as appropriate: allergies, current medications, past family history, past medical history, past social history, past surgical history and problem list      Past Medical History:   Diagnosis Date    Anxiety     BPH with obstruction/lower urinary tract symptoms     LAST ASSESSED: 9/15/14    Depression     Hx of laceration of skin     LAST ASSESSED: 11/3/14    Hyperlipidemia     Long term use of drug     LAST ASSESSED: 3/8/14    Memory loss     Pneumonia     LAST ASSESSED: 5/12/14    Sleep apnea     Tachycardia     LAST ASSESSED: 3/8/14    Thrombophlebitis of superficial veins of upper extremities     LAST ASSESSED: 8/9/17    Uncomplicated alcohol abuse     LAST ASSESSED: 2/28/15    Weakness of both arms     LAST ASSESSED: 5/12/14    Weakness of both legs     LAST ASSESSED: 5/12/14       Past Surgical History:   Procedure Laterality Date    COLONOSCOPY      COMPLETE    HERNIA REPAIR      SKIN LESION EXCISION         Family History   Problem Relation Age of Onset    Arthritis Mother     Hyperlipidemia Mother     Dementia Mother         started late 76s    Tinnitus Mother     Arthritis Family     Hyperlipidemia Family     Anxiety disorder Sister          Medications have been verified  Objective   /70   Pulse 94   Temp 98 2 °F (36 8 °C) (Temporal)   Resp 18   Ht 5' 8" (1 727 m)   Wt 71 9 kg (158 lb 9 6 oz)   SpO2 97%   BMI 24 12 kg/m²        Physical Exam     Physical Exam   Constitutional: He appears well-developed and well-nourished  No distress  HENT:   Head: Normocephalic and atraumatic  Right Ear: Hearing, tympanic membrane, external ear and ear canal normal    Left Ear: Hearing, tympanic membrane, external ear and ear canal normal    Nose: Rhinorrhea present  Right sinus exhibits no maxillary sinus tenderness and no frontal sinus tenderness  Left sinus exhibits no maxillary sinus tenderness and no frontal sinus tenderness  Mouth/Throat: Uvula is midline, oropharynx is clear and moist and mucous membranes are normal    Neck: Normal range of motion  Neck supple  Cardiovascular: Normal rate, regular rhythm, normal heart sounds and intact distal pulses  Pulmonary/Chest: Effort normal and breath sounds normal  No respiratory distress  He has no wheezes  He has no rales  He exhibits no tenderness  Lymphadenopathy:     He has no cervical adenopathy  Skin: He is not diaphoretic

## 2019-12-27 ENCOUNTER — TELEPHONE (OUTPATIENT)
Dept: SLEEP CENTER | Facility: CLINIC | Age: 64
End: 2019-12-27

## 2019-12-27 NOTE — TELEPHONE ENCOUNTER
Discussed sleep study results with patient and his wife  Patient scheduled sleep study  Financial no show liability form reviewed with the patient

## 2019-12-31 ENCOUNTER — TELEPHONE (OUTPATIENT)
Dept: SLEEP CENTER | Facility: CLINIC | Age: 64
End: 2019-12-31

## 2019-12-31 NOTE — TELEPHONE ENCOUNTER
Pt came into office for f/u apt , dr Forest Tse was not in on this day, pt wife wants to know the status of the medication they had talked about to help him sleep   Pt leaves on vacation 1/8 until the beginning of April 2020

## 2020-01-05 NOTE — TELEPHONE ENCOUNTER
I spoke with the patient's wife  The pt has symptoms of PLMS and perhaps RLS, which I will address when he comes in in April for follow-up

## 2020-01-06 ENCOUNTER — OFFICE VISIT (OUTPATIENT)
Dept: UROLOGY | Facility: CLINIC | Age: 65
End: 2020-01-06
Payer: MEDICARE

## 2020-01-06 VITALS
SYSTOLIC BLOOD PRESSURE: 108 MMHG | HEIGHT: 68 IN | WEIGHT: 159.4 LBS | DIASTOLIC BLOOD PRESSURE: 64 MMHG | BODY MASS INDEX: 24.16 KG/M2 | HEART RATE: 68 BPM

## 2020-01-06 DIAGNOSIS — Z12.5 SCREENING FOR PROSTATE CANCER: Primary | ICD-10-CM

## 2020-01-06 PROCEDURE — 99213 OFFICE O/P EST LOW 20 MIN: CPT | Performed by: UROLOGY

## 2020-01-06 NOTE — PROGRESS NOTES
1/6/2020    Izzy Blind  1955  [id number]        Assessment  Routine prostate cancer screening      Discussion  Provided the patient and his wife with reassurance that his recent PSA level is 0 6  As there is no strong family history of prostate cancer recommend follow-up in 2 years based on current AUA guidelines  In 2 years time we will perform a digital rectal examination as well as a repeat PSA  History of Present Illness  75 y o  male with a history of routine prostate cancer screening  He has minimal lower urinary tract symptoms  His wife is present with him in the office today  Both the patient and his wife have concerns over his issues with memory at this time  He is following with his family physician for this currently  His most recent PSA level is 0 6  The patient has previously been seen by Dr Sonal Hernandez  He is transferring care  AUA Symptom Score      Review of Systems  Review of Systems   Constitutional: Negative  HENT: Negative  Eyes: Negative  Respiratory: Negative  Cardiovascular: Negative  Gastrointestinal: Negative  Endocrine: Negative  Genitourinary: Negative  Musculoskeletal: Negative  Skin: Negative  Allergic/Immunologic: Negative  Neurological: Negative  Hematological: Negative  Psychiatric/Behavioral: Negative            Past Medical History  Past Medical History:   Diagnosis Date    Anxiety     BPH with obstruction/lower urinary tract symptoms     LAST ASSESSED: 9/15/14    Depression     Hx of laceration of skin     LAST ASSESSED: 11/3/14    Hyperlipidemia     Long term use of drug     LAST ASSESSED: 3/8/14    Memory loss     Pneumonia     LAST ASSESSED: 5/12/14    Sleep apnea     Tachycardia     LAST ASSESSED: 3/8/14    Thrombophlebitis of superficial veins of upper extremities     LAST ASSESSED: 8/0/00    Uncomplicated alcohol abuse     LAST ASSESSED: 2/28/15    Weakness of both arms     LAST ASSESSED: 5/12/14  Weakness of both legs     LAST ASSESSED: 5/12/14       Past Social History  Past Surgical History:   Procedure Laterality Date    COLONOSCOPY      COMPLETE    HERNIA REPAIR      SKIN LESION EXCISION         Past Family History  Family History   Problem Relation Age of Onset    Arthritis Mother     Hyperlipidemia Mother     Dementia Mother         started late 76s    Tinnitus Mother     Arthritis Family     Hyperlipidemia Family     Anxiety disorder Sister        Past Social history  Social History     Socioeconomic History    Marital status: /Civil Union     Spouse name: Not on file    Number of children: 3    Years of education: 12+    Highest education level: Not on file   Occupational History    Occupation: retired   Social Needs    Financial resource strain: Somewhat hard   [place] insecurity:     Worry: Not on file     Inability: Not on file    Transportation needs:     Medical: Not on file     Non-medical: Not on file   Tobacco Use    Smoking status: Former Smoker    Smokeless tobacco: Never Used    Tobacco comment: quit 20 years ago   Substance and Sexual Activity    Alcohol use: Yes     Frequency: 2-3 times a week     Drinks per session: 1 or 2     Binge frequency: Never     Comment: BEING A SOCIAL DRINKER    Drug use: No    Sexual activity: Not on file   Lifestyle    Physical activity:     Days per week: Not on file     Minutes per session: Not on file    Stress: Not on file   Relationships    Social connections:     Talks on phone: Not on file     Gets together: Not on file     Attends Mormon service: Not on file     Active member of club or organization: Not on file     Attends meetings of clubs or organizations: Not on file     Relationship status: Not on file    Intimate partner violence:     Fear of current or ex partner: Not on file     Emotionally abused: Not on file     Physically abused: Not on file     Forced sexual activity: Not on file   Other Topics Concern    Not on file   Social History Narrative    Not on file       Current Medications  Current Outpatient Medications   Medication Sig Dispense Refill    aspirin 81 MG tablet Take 1 tablet by mouth daily      citalopram (CeleXA) 40 mg tablet Take 1 tablet (40 mg total) by mouth daily 90 tablet 0    Flaxseed, Linseed, (FLAX SEED OIL) 1300 MG CAPS Take by mouth      fluticasone (FLONASE) 50 mcg/act nasal spray 2 sprays into each nostril as needed        Multiple Vitamin (CVS DAILY MULTIPLE PO) Take by mouth      pravastatin (PRAVACHOL) 40 mg tablet Take 1 tablet (40 mg total) by mouth daily for 90 days 90 tablet 2    LORazepam (ATIVAN) 0 5 mg tablet Take 1 tablet (0 5 mg total) by mouth every 8 (eight) hours as needed for anxiety 45 tablet 0    meloxicam (MOBIC) 15 mg tablet Take 1 tablet (15 mg total) by mouth daily as needed for moderate pain for up to 90 days 90 tablet 0    zolpidem (AMBIEN) 10 mg tablet Take 1/2 tablet at time of sleep  Take the other 1/2 in 1 hour if the patient is still awake (Patient not taking: Reported on 12/24/2019) 30 tablet 0     No current facility-administered medications for this visit  Allergies  Allergies   Allergen Reactions    Fish Oil + D3 [Fish Oil-Cholecalciferol] Rash    Oseltamivir Rash       Past Medical History, Social History, Family History, medications and allergies were reviewed  Vitals  Vitals:    01/06/20 0926   BP: 108/64   BP Location: Right arm   Patient Position: Sitting   Cuff Size: Adult   Pulse: 68   Weight: 72 3 kg (159 lb 6 4 oz)   Height: 5' 8" (1 727 m)       Physical Exam  Physical Exam  On examination he is in no acute distress  His abdomen is soft nontender nondistended  A right inguinal hernia scars visualized and well-healed  There are no recurrent hernias  Phallus, scrotum and scrotal contents are normal   Digital rectal examination reveals a 50 g prostate which is smooth and firm without nodularity  Skin is warm  Extremities without edema    Neurologic is grossly intact and nonfocal   Gait normal   Affect normal       Results  Lab Results   Component Value Date    PSA 0 6 11/01/2019    PSA 0 5 06/17/2019    PSA 0 5 01/05/2019     Lab Results   Component Value Date    GLUCOSE 97 02/16/2014    CALCIUM 9 2 11/01/2019     09/07/2017    K 4 0 11/01/2019    CO2 29 11/01/2019     11/01/2019    BUN 19 11/01/2019    CREATININE 0 95 11/01/2019     Lab Results   Component Value Date    WBC 4 49 06/17/2019    HGB 14 3 06/17/2019    HCT 43 8 06/17/2019    MCV 91 06/17/2019     06/17/2019         Office Urine Dip  No results found for this or any previous visit (from the past 1 hour(s)) ]

## 2020-01-14 ENCOUNTER — TELEPHONE (OUTPATIENT)
Dept: INTERNAL MEDICINE CLINIC | Facility: CLINIC | Age: 65
End: 2020-01-14

## 2020-01-14 NOTE — TELEPHONE ENCOUNTER
I spoke to his wife  She will continue to monitor his symptoms of forgetfulness  She reports that he has been restless and has difficult sleeping at night  She will start giving him some lorazepam to see if that will help with sleep  She will call to schedule an appointment with neurology when she returns home from the [facility] in March

## 2020-01-14 NOTE — TELEPHONE ENCOUNTER
Pt's wife called wanting to speak with you when you have a chance  She wants to check him for dementia again but they are still in [facility] till end of March  She asked that you call her to discuss options  Please advise

## 2020-03-08 DIAGNOSIS — E78.01 FAMILIAL HYPERCHOLESTEROLEMIA: Chronic | ICD-10-CM

## 2020-03-08 RX ORDER — PRAVASTATIN SODIUM 40 MG
40 TABLET ORAL DAILY
Qty: 90 TABLET | Refills: 2 | Status: SHIPPED | OUTPATIENT
Start: 2020-03-08 | End: 2021-04-29 | Stop reason: SDUPTHER

## 2020-03-30 ENCOUNTER — TELEPHONE (OUTPATIENT)
Dept: SLEEP CENTER | Facility: CLINIC | Age: 65
End: 2020-03-30

## 2020-03-30 NOTE — TELEPHONE ENCOUNTER
Left message for patient that CPAP study is canceled & we will call back with recommendations  How would you like to proceed?

## 2020-03-31 DIAGNOSIS — G47.33 OSA (OBSTRUCTIVE SLEEP APNEA): Primary | ICD-10-CM

## 2020-04-01 DIAGNOSIS — F33.1 MAJOR DEPRESSIVE DISORDER, RECURRENT, MODERATE (HCC): ICD-10-CM

## 2020-04-01 DIAGNOSIS — G31.84 MILD COGNITIVE IMPAIRMENT WITH MEMORY LOSS: ICD-10-CM

## 2020-04-01 RX ORDER — CITALOPRAM 40 MG/1
40 TABLET ORAL DAILY
Qty: 90 TABLET | Refills: 1 | Status: SHIPPED | OUTPATIENT
Start: 2020-04-01 | End: 2020-05-11 | Stop reason: SDUPTHER

## 2020-04-01 NOTE — TELEPHONE ENCOUNTER
I spoke with Mrs Bragg Phlegm, (ok per chart)  Advised above  She is concerned about going out for visit  Advised will inform [facility] and they will be in contact with them to schedule delivery and phone set up  She did not want to schedule compliance follow up at this time and will call to schedule  I stressed this is a very important appointment and should be scheduled to as close to the 31 days after receiving machine as possible  She verbalizes understanding  [facility] aware

## 2020-04-02 ENCOUNTER — TELEPHONE (OUTPATIENT)
Dept: NEUROLOGY | Facility: CLINIC | Age: 65
End: 2020-04-02

## 2020-04-07 ENCOUNTER — TELEPHONE (OUTPATIENT)
Dept: SLEEP CENTER | Facility: CLINIC | Age: 65
End: 2020-04-07

## 2020-04-07 ENCOUNTER — TELEMEDICINE (OUTPATIENT)
Dept: NEUROLOGY | Facility: CLINIC | Age: 65
End: 2020-04-07
Payer: MEDICARE

## 2020-04-07 DIAGNOSIS — F03.90 DEMENTIA WITHOUT BEHAVIORAL DISTURBANCE, UNSPECIFIED DEMENTIA TYPE (HCC): Primary | ICD-10-CM

## 2020-04-07 PROCEDURE — 99215 OFFICE O/P EST HI 40 MIN: CPT | Performed by: PSYCHIATRY & NEUROLOGY

## 2020-04-07 RX ORDER — DONEPEZIL HYDROCHLORIDE 10 MG/1
10 TABLET, FILM COATED ORAL
Qty: 30 TABLET | Refills: 3 | Status: SHIPPED | OUTPATIENT
Start: 2020-04-07 | End: 2020-04-24

## 2020-04-09 DIAGNOSIS — G47.33 OSA (OBSTRUCTIVE SLEEP APNEA): Primary | ICD-10-CM

## 2020-04-10 ENCOUNTER — TELEPHONE (OUTPATIENT)
Dept: SLEEP CENTER | Facility: CLINIC | Age: 65
End: 2020-04-10

## 2020-04-20 ENCOUNTER — TELEPHONE (OUTPATIENT)
Dept: NEUROLOGY | Facility: CLINIC | Age: 65
End: 2020-04-20

## 2020-04-20 DIAGNOSIS — F03.90 DEMENTIA WITHOUT BEHAVIORAL DISTURBANCE, UNSPECIFIED DEMENTIA TYPE (HCC): Primary | ICD-10-CM

## 2020-04-22 ENCOUNTER — CLINICAL SUPPORT (OUTPATIENT)
Dept: FAMILY MEDICINE CLINIC | Facility: CLINIC | Age: 65
End: 2020-04-22

## 2020-04-22 DIAGNOSIS — F03.90 DEMENTIA WITHOUT BEHAVIORAL DISTURBANCE, UNSPECIFIED DEMENTIA TYPE (HCC): Primary | ICD-10-CM

## 2020-04-22 RX ORDER — KRILL/OM-3/DHA/EPA/PHOSPHO/AST 500MG-86MG
CAPSULE ORAL
COMMUNITY
End: 2021-01-28

## 2020-04-22 RX ORDER — PROPRANOLOL/HYDROCHLOROTHIAZID 40 MG-25MG
TABLET ORAL
COMMUNITY
End: 2021-01-28

## 2020-04-24 RX ORDER — MEMANTINE HYDROCHLORIDE 10 MG/1
10 TABLET ORAL 2 TIMES DAILY
Qty: 60 TABLET | Refills: 3 | Status: SHIPPED | OUTPATIENT
Start: 2020-04-24 | End: 2020-05-05

## 2020-05-01 ENCOUNTER — TELEPHONE (OUTPATIENT)
Dept: NEUROLOGY | Facility: CLINIC | Age: 65
End: 2020-05-01

## 2020-05-05 ENCOUNTER — TELEMEDICINE (OUTPATIENT)
Dept: NEUROLOGY | Facility: CLINIC | Age: 65
End: 2020-05-05
Payer: MEDICARE

## 2020-05-05 DIAGNOSIS — F03.90 DEMENTIA WITHOUT BEHAVIORAL DISTURBANCE, UNSPECIFIED DEMENTIA TYPE (HCC): Primary | ICD-10-CM

## 2020-05-05 DIAGNOSIS — F33.1 MAJOR DEPRESSIVE DISORDER, RECURRENT, MODERATE (HCC): ICD-10-CM

## 2020-05-05 PROCEDURE — 99215 OFFICE O/P EST HI 40 MIN: CPT | Performed by: PHYSICIAN ASSISTANT

## 2020-05-05 RX ORDER — DONEPEZIL HYDROCHLORIDE 10 MG/1
10 TABLET, FILM COATED ORAL
Qty: 30 TABLET | Refills: 3
Start: 2020-05-05 | End: 2020-05-05 | Stop reason: SDUPTHER

## 2020-05-05 RX ORDER — DONEPEZIL HYDROCHLORIDE 10 MG/1
10 TABLET, FILM COATED ORAL
Qty: 30 TABLET | Refills: 3 | Status: SHIPPED | OUTPATIENT
Start: 2020-05-05 | End: 2020-07-10 | Stop reason: SDUPTHER

## 2020-05-07 ENCOUNTER — TELEMEDICINE (OUTPATIENT)
Dept: INTERNAL MEDICINE CLINIC | Facility: CLINIC | Age: 65
End: 2020-05-07
Payer: MEDICARE

## 2020-05-07 DIAGNOSIS — F41.8 DEPRESSION WITH ANXIETY: Chronic | ICD-10-CM

## 2020-05-07 DIAGNOSIS — E78.01 FAMILIAL HYPERCHOLESTEROLEMIA: Chronic | ICD-10-CM

## 2020-05-07 DIAGNOSIS — F41.9 ANXIETY: Chronic | ICD-10-CM

## 2020-05-07 DIAGNOSIS — N40.0 BENIGN PROSTATIC HYPERPLASIA, UNSPECIFIED WHETHER LOWER URINARY TRACT SYMPTOMS PRESENT: Chronic | ICD-10-CM

## 2020-05-07 DIAGNOSIS — G47.33 OSA (OBSTRUCTIVE SLEEP APNEA): ICD-10-CM

## 2020-05-07 DIAGNOSIS — F03.90 DEMENTIA WITHOUT BEHAVIORAL DISTURBANCE, UNSPECIFIED DEMENTIA TYPE (HCC): Primary | ICD-10-CM

## 2020-05-07 PROCEDURE — 99214 OFFICE O/P EST MOD 30 MIN: CPT | Performed by: INTERNAL MEDICINE

## 2020-05-11 DIAGNOSIS — G31.84 MILD COGNITIVE IMPAIRMENT WITH MEMORY LOSS: ICD-10-CM

## 2020-05-11 DIAGNOSIS — F33.1 MAJOR DEPRESSIVE DISORDER, RECURRENT, MODERATE (HCC): ICD-10-CM

## 2020-05-11 RX ORDER — CITALOPRAM 40 MG/1
40 TABLET ORAL DAILY
Qty: 90 TABLET | Refills: 1 | Status: SHIPPED | OUTPATIENT
Start: 2020-05-11 | End: 2020-10-29 | Stop reason: SDUPTHER

## 2020-05-14 DIAGNOSIS — J30.2 SEASONAL ALLERGIES: Primary | ICD-10-CM

## 2020-05-14 RX ORDER — FLUTICASONE PROPIONATE 50 MCG
2 SPRAY, SUSPENSION (ML) NASAL DAILY
Qty: 16 ML | Refills: 1 | Status: SHIPPED | OUTPATIENT
Start: 2020-05-14 | End: 2020-06-08

## 2020-05-27 ENCOUNTER — HOSPITAL ENCOUNTER (OUTPATIENT)
Dept: RADIOLOGY | Age: 65
Discharge: HOME | End: 2020-05-27
Payer: MEDICARE

## 2020-05-27 DIAGNOSIS — F03.90 DEMENTIA WITHOUT BEHAVIORAL DISTURBANCE, UNSPECIFIED DEMENTIA TYPE (HCC): ICD-10-CM

## 2020-05-27 PROCEDURE — 70553 MRI BRAIN STEM W/O & W/DYE: CPT

## 2020-05-27 PROCEDURE — 76376 3D RENDER W/INTRP POSTPROCES: CPT

## 2020-05-27 PROCEDURE — A9585 GADOBUTROL INJECTION: HCPCS | Performed by: PSYCHIATRY & NEUROLOGY

## 2020-05-27 RX ADMIN — GADOBUTROL 6 ML: 604.72 INJECTION INTRAVENOUS at 10:04

## 2020-05-28 ENCOUNTER — TELEPHONE (OUTPATIENT)
Dept: LAB | Facility: HOSPITAL | Age: 65
End: 2020-05-28

## 2020-05-28 ENCOUNTER — TELEPHONE (OUTPATIENT)
Dept: NEUROLOGY | Facility: CLINIC | Age: 65
End: 2020-05-28

## 2020-05-29 DIAGNOSIS — G31.84 MILD COGNITIVE IMPAIRMENT WITH MEMORY LOSS: ICD-10-CM

## 2020-05-29 DIAGNOSIS — A53.0: ICD-10-CM

## 2020-05-29 DIAGNOSIS — Z77.018 HEAVY METAL EXPOSURE: Primary | ICD-10-CM

## 2020-05-29 DIAGNOSIS — Z11.59 ENCOUNTER FOR HEPATITIS C SCREENING TEST FOR LOW RISK PATIENT: ICD-10-CM

## 2020-05-29 DIAGNOSIS — Z11.4 SCREENING FOR HIV WITHOUT PRESENCE OF RISK FACTORS: ICD-10-CM

## 2020-05-29 DIAGNOSIS — F03.90 DEMENTIA WITHOUT BEHAVIORAL DISTURBANCE, UNSPECIFIED DEMENTIA TYPE (HCC): ICD-10-CM

## 2020-05-29 DIAGNOSIS — F33.1 MAJOR DEPRESSIVE DISORDER, RECURRENT, MODERATE (HCC): ICD-10-CM

## 2020-06-01 ENCOUNTER — TELEPHONE (OUTPATIENT)
Dept: NEUROLOGY | Facility: CLINIC | Age: 65
End: 2020-06-01

## 2020-06-01 DIAGNOSIS — R79.89 LOW VITAMIN D LEVEL: ICD-10-CM

## 2020-06-01 DIAGNOSIS — E55.9 VITAMIN D DEFICIENCY: ICD-10-CM

## 2020-06-01 DIAGNOSIS — F03.90 DEMENTIA WITHOUT BEHAVIORAL DISTURBANCE, UNSPECIFIED DEMENTIA TYPE (HCC): Primary | ICD-10-CM

## 2020-06-08 DIAGNOSIS — J30.2 SEASONAL ALLERGIES: ICD-10-CM

## 2020-06-08 RX ORDER — FLUTICASONE PROPIONATE 50 MCG
SPRAY, SUSPENSION (ML) NASAL
Qty: 16 ML | Refills: 1 | Status: SHIPPED | OUTPATIENT
Start: 2020-06-08 | End: 2020-07-02

## 2020-06-11 ENCOUNTER — APPOINTMENT (OUTPATIENT)
Dept: LAB | Facility: HOSPITAL | Age: 65
End: 2020-06-11
Payer: MEDICARE

## 2020-06-11 DIAGNOSIS — A53.0: ICD-10-CM

## 2020-06-11 DIAGNOSIS — G31.84 MILD COGNITIVE IMPAIRMENT WITH MEMORY LOSS: ICD-10-CM

## 2020-06-11 DIAGNOSIS — E78.01 FAMILIAL HYPERCHOLESTEROLEMIA: Chronic | ICD-10-CM

## 2020-06-11 DIAGNOSIS — Z11.59 ENCOUNTER FOR HEPATITIS C SCREENING TEST FOR LOW RISK PATIENT: ICD-10-CM

## 2020-06-11 DIAGNOSIS — F33.1 MAJOR DEPRESSIVE DISORDER, RECURRENT, MODERATE (HCC): ICD-10-CM

## 2020-06-11 DIAGNOSIS — E55.9 VITAMIN D DEFICIENCY: ICD-10-CM

## 2020-06-11 DIAGNOSIS — Z11.4 SCREENING FOR HIV WITHOUT PRESENCE OF RISK FACTORS: ICD-10-CM

## 2020-06-11 DIAGNOSIS — F41.8 DEPRESSION WITH ANXIETY: Chronic | ICD-10-CM

## 2020-06-11 DIAGNOSIS — M54.12 CERVICAL RADICULOPATHY: ICD-10-CM

## 2020-06-11 DIAGNOSIS — M54.16 LUMBAR RADICULOPATHY: ICD-10-CM

## 2020-06-11 DIAGNOSIS — G47.33 OBSTRUCTIVE SLEEP APNEA: ICD-10-CM

## 2020-06-11 DIAGNOSIS — Z77.018 HEAVY METAL EXPOSURE: ICD-10-CM

## 2020-06-11 DIAGNOSIS — F03.90 DEMENTIA WITHOUT BEHAVIORAL DISTURBANCE, UNSPECIFIED DEMENTIA TYPE (HCC): ICD-10-CM

## 2020-06-11 LAB
25(OH)D3 SERPL-MCNC: 55.8 NG/ML (ref 30–100)
ALBUMIN SERPL BCP-MCNC: 3.8 G/DL (ref 3.5–5)
ALP SERPL-CCNC: 95 U/L (ref 46–116)
ALT SERPL W P-5'-P-CCNC: 34 U/L (ref 12–78)
ANION GAP SERPL CALCULATED.3IONS-SCNC: 6 MMOL/L (ref 4–13)
AST SERPL W P-5'-P-CCNC: 24 U/L (ref 5–45)
BILIRUB SERPL-MCNC: 0.56 MG/DL (ref 0.2–1)
BUN SERPL-MCNC: 16 MG/DL (ref 5–25)
CALCIUM SERPL-MCNC: 9 MG/DL (ref 8.3–10.1)
CHLORIDE SERPL-SCNC: 103 MMOL/L (ref 100–108)
CHOLEST SERPL-MCNC: 187 MG/DL (ref 50–200)
CO2 SERPL-SCNC: 28 MMOL/L (ref 21–32)
CREAT SERPL-MCNC: 0.94 MG/DL (ref 0.6–1.3)
GFR SERPL CREATININE-BSD FRML MDRD: 84 ML/MIN/1.73SQ M
GLUCOSE SERPL-MCNC: 69 MG/DL (ref 65–140)
HCV AB SER QL: NORMAL
HDLC SERPL-MCNC: 74 MG/DL
LDLC SERPL CALC-MCNC: 101 MG/DL (ref 0–100)
POTASSIUM SERPL-SCNC: 3.6 MMOL/L (ref 3.5–5.3)
PROT SERPL-MCNC: 7 G/DL (ref 6.4–8.2)
SODIUM SERPL-SCNC: 137 MMOL/L (ref 136–145)
TRIGL SERPL-MCNC: 61 MG/DL
TSH SERPL DL<=0.05 MIU/L-ACNC: 1.76 UIU/ML (ref 0.36–3.74)
VIT B12 SERPL-MCNC: 1996 PG/ML (ref 100–900)

## 2020-06-11 PROCEDURE — 82306 VITAMIN D 25 HYDROXY: CPT

## 2020-06-11 PROCEDURE — 82607 VITAMIN B-12: CPT

## 2020-06-11 PROCEDURE — 84443 ASSAY THYROID STIM HORMONE: CPT

## 2020-06-11 PROCEDURE — 83655 ASSAY OF LEAD: CPT

## 2020-06-11 PROCEDURE — 82175 ASSAY OF ARSENIC: CPT

## 2020-06-11 PROCEDURE — 83825 ASSAY OF MERCURY: CPT

## 2020-06-11 PROCEDURE — 87389 HIV-1 AG W/HIV-1&-2 AB AG IA: CPT

## 2020-06-11 PROCEDURE — 86592 SYPHILIS TEST NON-TREP QUAL: CPT

## 2020-06-11 PROCEDURE — 36415 COLL VENOUS BLD VENIPUNCTURE: CPT

## 2020-06-11 PROCEDURE — 80061 LIPID PANEL: CPT

## 2020-06-11 PROCEDURE — 86803 HEPATITIS C AB TEST: CPT

## 2020-06-11 PROCEDURE — 80053 COMPREHEN METABOLIC PANEL: CPT

## 2020-06-12 LAB
HIV 1+2 AB+HIV1 P24 AG SERPL QL IA: NORMAL
RPR SER QL: NORMAL

## 2020-06-15 LAB
ARSENIC BLD-MCNC: 5 UG/L (ref 2–23)
LEAD BLD-MCNC: NORMAL UG/DL (ref 0–4)
MERCURY BLD-MCNC: NORMAL UG/L (ref 0–14.9)

## 2020-06-16 DIAGNOSIS — N42.9 DISORDER OF PROSTATE, UNSPECIFIED: ICD-10-CM

## 2020-06-16 DIAGNOSIS — Z12.5 SCREENING FOR PROSTATE CANCER: ICD-10-CM

## 2020-06-16 DIAGNOSIS — E78.01 FAMILIAL HYPERCHOLESTEROLEMIA: Primary | ICD-10-CM

## 2020-07-01 ENCOUNTER — TELEPHONE (OUTPATIENT)
Dept: PSYCHIATRY | Facility: CLINIC | Age: 65
End: 2020-07-01

## 2020-07-01 NOTE — TELEPHONE ENCOUNTER
Behavorial Health Outpatient Intake Questions    Referred by: [facility] - Ricardo Youssef PCP    Please advised interviewee that they need to answer all questions truthfully to allow for best care and any misrepresentations of information may affect their ability to be seen at this clinic   => Was this discussed? Yes     [facility] -     Presenting Problem (in patient's words): Patient has been getting more forgetful  He see's Dr Nelida Mccollum for Neurology  Patient received a MRI and it showed no signs of Alzheimer's or Dementia  He is getting depressed due to the virus, he is usually quite social   Wife stated that they started getting breakfast and it increases his socialization which helps  When he just sits at home is when he starts to get down on himself, wife has a hard time getting him to do simple things like cut the grass  She will offer to do it which causes to him to feel guilty so then it'll force him to complete the task  That is not her intention though  All he wants to do is sleep  Now, he is barely getting out of bed by 9AM  He is tired of feeling down  Are there any developmental disabilities? ? If yes, can they speak to you on the phone? If they are too limited to speak to you on phone, refer out No    Are you taking any psychiatric medications? Yes    => If yes, who prescribes? If yes, are they injectable medications? Citalopram  40 mg - no Injectables     Does the patient have a language barrier or hearing impairment? Yes hearing aides in both ears     Have you been treated at [facility] by a therapist or a doctor in the past? If yes, who? Yes Sha Koenig     Has the patient been hospitalized for mental health? No   If yes, how long ago was last hospitalization and where was it? Do you actively use alcohol or marijuana or illegal substances?  If yes, what and how much - refer out to Drug and alcohol treatment if use is excessive or daily use of illegal substances No concerns of substance abuse are reported  Do you have a community treatment team or ? No    Legal History-     Does the patient have any history of arrests, halfway/[facility] time, or DUIs? No  If Yes-  1) What types of charges? 2) When were they last incarcerated? 3) Are they currently on parole or probation? Minor Child-    Who has custody of the child? N/A    Is there a custody agreement? N/A    If there is a custody agreement remind parent that they must bring a copy to the first appt or they will not be seen  Intake Team, please check with provider before scheduling if flags come up such as:  - complex case  - legal history (other than DUI)  - communication barrier concerns are present  - if, in your judgment, this needs further review    ACCEPTED as a patient Yes  => Appointment Date: Tuesday, July 14th at 9:00AM with Teagan Centeno     Referred Elsewhere? No    Name of Insurance Co: Medicare A and B  Insurance ID# [employer] #  If ins is primary or secondary  If patient is a minor, parents information such as Name, D  O B of guarantor

## 2020-07-02 ENCOUNTER — OFFICE VISIT (OUTPATIENT)
Dept: SLEEP CENTER | Facility: CLINIC | Age: 65
End: 2020-07-02
Payer: MEDICARE

## 2020-07-02 ENCOUNTER — TELEPHONE (OUTPATIENT)
Dept: SLEEP CENTER | Facility: CLINIC | Age: 65
End: 2020-07-02

## 2020-07-02 VITALS
SYSTOLIC BLOOD PRESSURE: 105 MMHG | BODY MASS INDEX: 24.22 KG/M2 | HEIGHT: 68 IN | DIASTOLIC BLOOD PRESSURE: 60 MMHG | WEIGHT: 159.8 LBS

## 2020-07-02 DIAGNOSIS — G47.33 OSA (OBSTRUCTIVE SLEEP APNEA): Primary | ICD-10-CM

## 2020-07-02 DIAGNOSIS — J30.2 SEASONAL ALLERGIES: ICD-10-CM

## 2020-07-02 PROCEDURE — 1160F RVW MEDS BY RX/DR IN RCRD: CPT | Performed by: INTERNAL MEDICINE

## 2020-07-02 PROCEDURE — 3008F BODY MASS INDEX DOCD: CPT | Performed by: INTERNAL MEDICINE

## 2020-07-02 PROCEDURE — 99214 OFFICE O/P EST MOD 30 MIN: CPT | Performed by: INTERNAL MEDICINE

## 2020-07-02 PROCEDURE — 1036F TOBACCO NON-USER: CPT | Performed by: INTERNAL MEDICINE

## 2020-07-02 PROCEDURE — 4040F PNEUMOC VAC/ADMIN/RCVD: CPT | Performed by: INTERNAL MEDICINE

## 2020-07-02 RX ORDER — FLUTICASONE PROPIONATE 50 MCG
SPRAY, SUSPENSION (ML) NASAL
Qty: 16 ML | Refills: 1 | Status: SHIPPED | OUTPATIENT
Start: 2020-07-02 | End: 2020-07-24

## 2020-07-02 NOTE — TELEPHONE ENCOUNTER
Patient informed that COVID testing is to be performed 4-5 days prior to PAP study  Testing site information provided

## 2020-07-02 NOTE — PROGRESS NOTES
Progress Note - Sleep Center   Agata Palm QJM:[id number] MRN: [id number]      Reason for Visit:  77 y o male here for PAP compliance check    Assessment:  Doing well with new PAP device  Sleep quality is improved and the patient feels less drowsy  Compliance data show utilization for greater than or equal to 70% of nights, for greater than or equal to 4 hours per night  Plan:  Adequate compliance and successful treatment  Positive airway pressure titration study to determine best setting  The patient's sleep quality is still poor and he complains of excessive daytime sleepiness, worse than before he started his APAP  Follow up: One year    History of Present Illness:  History of DREW on PAP therapy  Meets adequate compliance  He complains of poor sleep quality  He has leg kicks but no restless legs          Review of Systems      Genitourinary none   Cardiology none   Gastrointestinal none   Neurology awaken with headache, forgetfulness and difficulty with memory   Constitutional fatigue   Integumentary none   Psychiatry anxiety and depression   Musculoskeletal back pain, legs twitching/jerking and leg cramps   Pulmonary snoring   ENT throat clearing and ringing in ears   Endocrine excessive thirst and frequent urination   Hematological none           I have reviewed and updated the review of systems as necessary    Historical Information    Past Medical History:   Diagnosis Date    Anxiety     BPH with obstruction/lower urinary tract symptoms     LAST ASSESSED: 9/15/14    Depression     Hx of laceration of skin     LAST ASSESSED: 11/3/14    Hyperlipidemia     Long term use of drug     LAST ASSESSED: 3/8/14    Memory loss     Pneumonia     LAST ASSESSED: 5/12/14    Sleep apnea     Tachycardia     LAST ASSESSED: 3/8/14    Thrombophlebitis of superficial veins of upper extremities     LAST ASSESSED: 7/3/46    Uncomplicated alcohol abuse     LAST ASSESSED: 2/28/15    Weakness of both arms LAST ASSESSED: 5/12/14    Weakness of both legs     LAST ASSESSED: 5/12/14         Past Surgical History:   Procedure Laterality Date    COLONOSCOPY      COMPLETE    HERNIA REPAIR      SKIN LESION EXCISION           Social History     Socioeconomic History    Marital status: /Civil Union     Spouse name: None    Number of children: 3    Years of education: 12+    Highest education level: None   Occupational History    Occupation: retired   Social Needs    Financial resource strain: Somewhat hard   [address] insecurity:     Worry: None     Inability: None    Transportation needs:     Medical: None     Non-medical: None   Tobacco Use    Smoking status: Former Smoker    Smokeless tobacco: Never Used    Tobacco comment: quit 20 years ago   Substance and Sexual Activity    Alcohol use: Yes     Frequency: 2-3 times a week     Drinks per session: 1 or 2     Binge frequency: Never     Comment: BEING A SOCIAL DRINKER    Drug use: No    Sexual activity: None   Lifestyle    Physical activity:     Days per week: None     Minutes per session: None    Stress: None   Relationships    Social connections:     Talks on phone: None     Gets together: None     Attends Quaker service: None     Active member of club or organization: None     Attends meetings of clubs or organizations: None     Relationship status: None    Intimate partner violence:     Fear of current or ex partner: None     Emotionally abused: None     Physically abused: None     Forced sexual activity: None   Other Topics Concern    None   Social History Narrative    None         Family History   Problem Relation Age of Onset    Arthritis Mother     Hyperlipidemia Mother     Dementia Mother         started late 76s    Tinnitus Mother     Arthritis Family     Hyperlipidemia Family     Anxiety disorder Sister        Medications/Allergies:      Current Outpatient Medications:     aspirin 81 MG tablet, Take 1 tablet by mouth daily, Disp: , Rfl:     citalopram (CeleXA) 40 mg tablet, Take 1 tablet (40 mg total) by mouth daily, Disp: 90 tablet, Rfl: 1    cyanocobalamin (VITAMIN B-12) 1000 MCG tablet, Take 1,000 mcg by mouth daily, Disp: , Rfl:     donepezil (ARICEPT) 10 mg tablet, Take 1 tablet (10 mg total) by mouth daily at bedtime, Disp: 30 tablet, Rfl: 3    Flaxseed, Linseed, (FLAX SEED OIL) 1300 MG CAPS, Take by mouth, Disp: , Rfl:     fluticasone (FLONASE) 50 mcg/act nasal spray, SPRAY 2 SPRAYS INTO EACH NOSTRIL EVERY DAY, Disp: 16 mL, Rfl: 1    LORazepam (ATIVAN) 0 5 mg tablet, Take 1 tablet (0 5 mg total) by mouth every 8 (eight) hours as needed for anxiety, Disp: 45 tablet, Rfl: 0    Multiple Vitamin (CVS DAILY MULTIPLE PO), Take by mouth, Disp: , Rfl:     pravastatin (PRAVACHOL) 40 mg tablet, TAKE 1 TABLET (40 MG TOTAL) BY MOUTH DAILY FOR 90 DAYS, Disp: 90 tablet, Rfl: 2    Turmeric 500 MG CAPS, Take by mouth, Disp: , Rfl:     Krill Oil 500 MG CAPS, Take by mouth, Disp: , Rfl:       Objective    Vital Signs:   Vitals:    07/02/20 1400   BP: 105/60     [place] Sleepiness Scale: Total score: 16        Physical Exam:    General: Alert, appropriate, cooperative, overweight    Head: NC/AT    Skin: Warm, dry    Neuro: No motor abnormalities, cranial nerves appear intact    Extremity: No clubbing, cyanosis    PAP setting:   APAP 4 to 20 cm  DME Provider: [facility]  Test results:  24 2    Counseling / Coordination of Care  Total clinic time spent today 15 minutes  A description of the counseling / coordination of care: Maintain compliance  Discussed equipment  OCTAVIA Cameron    Board Certified Sleep Specialist

## 2020-07-03 DIAGNOSIS — Z11.59 SPECIAL SCREENING EXAMINATION FOR UNSPECIFIED VIRAL DISEASE: Primary | ICD-10-CM

## 2020-07-03 PROCEDURE — U0003 INFECTIOUS AGENT DETECTION BY NUCLEIC ACID (DNA OR RNA); SEVERE ACUTE RESPIRATORY SYNDROME CORONAVIRUS 2 (SARS-COV-2) (CORONAVIRUS DISEASE [COVID-19]), AMPLIFIED PROBE TECHNIQUE, MAKING USE OF HIGH THROUGHPUT TECHNOLOGIES AS DESCRIBED BY CMS-2020-01-R: HCPCS | Performed by: FAMILY MEDICINE

## 2020-07-08 LAB — SARS-COV-2 RNA SPEC QL NAA+PROBE: NOT DETECTED

## 2020-07-09 ENCOUNTER — TELEPHONE (OUTPATIENT)
Dept: SLEEP CENTER | Facility: CLINIC | Age: 65
End: 2020-07-09

## 2020-07-09 ENCOUNTER — HOSPITAL ENCOUNTER (OUTPATIENT)
Dept: SLEEP CENTER | Facility: CLINIC | Age: 65
Discharge: HOME | End: 2020-07-09
Payer: MEDICARE

## 2020-07-09 DIAGNOSIS — G47.33 OSA (OBSTRUCTIVE SLEEP APNEA): ICD-10-CM

## 2020-07-09 PROCEDURE — 95811 POLYSOM 6/>YRS CPAP 4/> PARM: CPT

## 2020-07-09 NOTE — PROGRESS NOTES
DEPARTMENT OF NEUROLOGICAL SCIENCES   [address]       PATIENT RETURN NOTE    Patient: Shady Bro  Medical Record Number: # [id number]  YOB: 1955  Date of visit: 7/10/2020    ASSESSMENT     Problem List Items Addressed This Visit        Nervous and Auditory    Dementia without behavioral disturbance ([address] ) - Primary    Relevant Medications    donepezil (ARICEPT) 10 mg tablet    Other Relevant Orders    MRI brain NeuroQuant wo and w contrast        Impression of this 78 yo gentleman returning for cognitive changes, improved with SSRI addition but still suffering from a lack of self-confidence in answering questions when challenged  Yet he is unhampered by confidence when doing much of his other tasks  On the days he is "good" he does well  His previous cognitive testing have been of questionable accuracy given poor effort to answer  Today he knew not the year, the location or recalled any of three given words  We explained to patient's wife our concerns for his situation - that while part of what he has may be pseudodementia, that it would be prudent to undergo the Driving Evaluation mentioned earlier and officially limit his driving  We strongly encouraged for them to surrender his driving license in exchange for a state ID card  She was very reluctant to consider the options at this time, stating he has no issues with driving while she has been with him, and would remain with him whenever he drives now  She says she would notify us immediately if there is any concern over his driving ability  Given part of his disability may be pseudodementia as suggested by earlier Neuropsychology evaluation, we will proceed as below and he will see his psychologist in case any improvement can be made  PLAN     · Continue with Aricept in case of underlying early stage organic dementia     · Repeat MRI Brain NeuroQuant in 12 months as follow up to possible developing dementia  · We discussed about referral to Occupational Therapy for driving evaluation for reassurance about his ability to handle on-the-road challenges  His wife would like to hold off this and will be supervising him  · Continue interaction with family within safety limits  Suggested continue to walk in the park, to continually seek activities to do that will motivate him  He likes fishing and would be interested in outdoors activities  · He will be seeing his psychiatry team soon for his history of depression  Wife does not feel the Celexa is helping enough  · Continue on turmeric (curcumin) 500-750mg daily  · Recommended Mediterranean diet for stroke risk and for memory  · Recommended continuing wearing his hearing aids to avoid missing conversations and issues with carrying on his daily life, adherence to an exercise regimen, sleep and hydration  · He should continue completing crossword puzzles and keeping his mind active  · Return to Clinic on 4 months    I spent 40 minutes directly with the patient during this visit and >50% was counseling/coordination of care    Provider located at [address] [place] [address] RD  [address]    Recent Visits  Date Type Provider Dept   07/10/20 Office Visit Michael Aguirre MD [facility] recent visits within past 7 days and meeting all other requirements     Future Appointments  No visits were found meeting these conditions  Showing future appointments within next 150 days and meeting all other requirements      Subjective  Libia Caro is a 77 y o  R-handed male with notable hx of anxiety/depression who returns for memory issues  Last seen by me 4/7/2020, but saw Anna Walton in 5/5/20  Interim History  Donepezil apparently caused transient GI upset and complaints of headache  MRI brain was WNL and without suspicious red flag   Today his wife says that he has "good and bad days" with his memory  He does use a CPAP  He says he is now more tolerating the donepezil if taken after food  His memory will immediately decline sharply if stressed  He is to see a psychologist next week  When properly motivated he can remain awake enough to do more daily tasks  Otherwise, without a structured schedule he would sleep excessively  He did get new hearing aids, which has helped his hearing  He does take the dogs for a walk  When asked, she strongly vouches for his driving safety  INITIAL HISTORY   The patient was accompanied today  History was obtained from patient and wife    In the interim, per PCP, he saw Neuropsychiatry who 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  His wife does not think he is depressed any longer, on the citalopram 40mg daily  She feels his memory has worsened in the meantime since early Fall 2019  Previously 8/2018 MMSE 26 and on 11/2019 MMSE 24  Wife feels that his concentration is "Not all there" and he can get confused of the date  She feels that he is still having difficulty in social demeanor and trouble getting words out, but positive change in terms of his interactions  He has seen Sleep Medicine and in addition to old dx of DREW, he has PLMS and RLS  He will be starting on CPAP soon  They called 4/2/20 regarding feeling his memory has since declined, requesting earlier appointment  He denies any hallucinations, paranoia but he does often worry about any action he takes  He has been started on Ativan, which helped  He does have good and bad days cognitively, but physically "has not changed"  He is fearful of making mistakes and less confident in his actions       Past Medical History:   Diagnosis Date    Anxiety     BPH with obstruction/lower urinary tract symptoms     LAST ASSESSED: 9/15/14    Depression     Hx of laceration of skin     LAST ASSESSED: 11/3/14    Hyperlipidemia     Long term use of drug     LAST ASSESSED: 3/8/14    Memory loss     Pneumonia     LAST ASSESSED: 5/12/14    Sleep apnea     Tachycardia     LAST ASSESSED: 3/8/14    Thrombophlebitis of superficial veins of upper extremities     LAST ASSESSED: 6/6/70    Uncomplicated alcohol abuse     LAST ASSESSED: 2/28/15    Weakness of both arms     LAST ASSESSED: 5/12/14    Weakness of both legs     LAST ASSESSED: 5/12/14     Past Surgical History:   Procedure Laterality Date    COLONOSCOPY      COMPLETE    HERNIA REPAIR      SKIN LESION EXCISION       Current Outpatient Medications   Medication Sig Dispense Refill    aspirin 81 MG tablet Take 1 tablet by mouth daily      citalopram (CeleXA) 40 mg tablet Take 1 tablet (40 mg total) by mouth daily 90 tablet 1    cyanocobalamin (VITAMIN B-12) 1000 MCG tablet Take 1,000 mcg by mouth daily      donepezil (ARICEPT) 10 mg tablet Take 1 tablet (10 mg total) by mouth daily at bedtime 90 tablet 2    Flaxseed, Linseed, (FLAX SEED OIL) 1300 MG CAPS Take by mouth      fluticasone (FLONASE) 50 mcg/act nasal spray SPRAY 2 SPRAYS INTO EACH NOSTRIL EVERY DAY 16 mL 1    LORazepam (ATIVAN) 0 5 mg tablet Take 1 tablet (0 5 mg total) by mouth every 8 (eight) hours as needed for anxiety 45 tablet 0    Multiple Vitamin ([employer] DAILY MULTIPLE PO) Take by mouth      pravastatin (PRAVACHOL) 40 mg tablet TAKE 1 TABLET (40 MG TOTAL) BY MOUTH DAILY FOR 90 DAYS 90 tablet 2    Turmeric 500 MG CAPS Take by mouth      Krill Oil 500 MG CAPS Take by mouth       No current facility-administered medications for this visit  Allergies   Allergen Reactions    Fish Oil + D3 [Fish Oil-Cholecalciferol] Rash    Oseltamivir Rash     ROS  Review of Systems   Constitutional: Negative  Negative for appetite change and fever  HENT: Negative  Negative for hearing loss, tinnitus, trouble swallowing and voice change  Eyes: Negative  Negative for photophobia and pain  Respiratory: Negative  Negative for shortness of breath  Cardiovascular: Negative  Negative for palpitations  Gastrointestinal: Negative  Negative for nausea and vomiting  Endocrine: Negative  Negative for cold intolerance  Genitourinary: Negative  Negative for dysuria, frequency and urgency  Musculoskeletal: Negative  Negative for myalgias and neck pain  Skin: Negative  Negative for rash  Allergic/Immunologic: Negative  Neurological: Positive for dizziness  Negative for tremors, seizures, syncope, facial asymmetry, speech difficulty, weakness, light-headedness, numbness and headaches  Positive for memory problems   Positive for forgetfulness   Hematological: Negative  Does not bruise/bleed easily  Psychiatric/Behavioral: Negative  Negative for confusion, hallucinations and sleep disturbance  EXAMINATION    Vitals:    07/10/20 1039   BP: 110/65   BP Location: Left arm   Patient Position: Sitting   Cuff Size: Large   Pulse: 58   Temp: 98 9 °F (37 2 °C)   TempSrc: Tympanic   Weight: 71 7 kg (158 lb)   Height: 5' 8" (1 727 m)       Physical Exam   Constitutional: He is oriented to person, place, and time  He appears well-developed and well-nourished  No distress  HENT:   Head: Normocephalic and atraumatic  Mouth/Throat: No oropharyngeal exudate  Eyes: Conjunctivae and EOM are normal  Right eye exhibits no discharge  Left eye exhibits no discharge  Neck: Normal range of motion  Musculoskeletal: Normal range of motion  Neurological: He is alert and oriented to person, place, and time  No cranial nerve deficit  Skin: Skin is warm  He is not diaphoretic  Psychiatric: He has a normal mood and affect  +hypomimia    Cognitive and Mental Exam:  [facility] OF THE [facility] 16/30 with 0/5 recall in Oct 2018  [employer] [facility] (for telephone virtual interviews) version 1 0 was 3/22 from poor effort in April 2020     Today he is awake and alert but states only his first name when asked his full name, unless prompted further  He does not know the year, but knows it is summer   He knows provider is a doctor but unable to state his current immediate settings  He recalls 0/3 word recall after one minute (cat, ball, tree)   He has trouble with word-pair associations    UPDRSIII                Time since last dose:   4/7/20      Speech  1     Facial Expression  2    Postural Tremor (Right) 0    Postural Tremor (Left) 0    Kinetic Tremor (Right)  0    Kinetic Tremor (Left)  0    Rest tremor amplitude RUE 0    Rest tremor amplitude LUE 0    Rest tremor amplitude RLE 0    Rest tremor amplitude LLE 0    Lip/Jaw Tremor  0    Consistency of tremor 0    Finger Taps (Right)   -    Finger Taps (Left)  -    Hand Movement (Right)  1    Hand Movement (Left)   1    Pronation/Supination (Right)  1    Pronation/Supination (Left)   1    Toe Tapping (Right) 1 slow    Toe Tapping (Left) 1 slow    Leg Agility (Right)  2 very hestitation    Leg Agility (Left)   2 very hesitatnt     Rigidity - Neck  -     Rigidity - Upper Extremity (Right)  -      Rigidity - Upper Extremity (Left)   -     Rigidity - Lower Extremity (Right)  -    Rigidity - Lower Extremity (Left)   -    Arising from Chair   0      Gait   1 slow     Freezing of Gait 0     Postural Stability  -     Posture 0     Global spontaneity of movement 0       Gait: Normal comprehensive gait evaluation, has normal raising, stance, gait, turns, AS, tip-toes, heels and Pull test   +reduced left arm swing compared to R

## 2020-07-10 ENCOUNTER — OFFICE VISIT (OUTPATIENT)
Dept: NEUROLOGY | Facility: CLINIC | Age: 65
End: 2020-07-10
Payer: MEDICARE

## 2020-07-10 VITALS
SYSTOLIC BLOOD PRESSURE: 110 MMHG | BODY MASS INDEX: 23.95 KG/M2 | HEART RATE: 58 BPM | TEMPERATURE: 98.9 F | WEIGHT: 158 LBS | DIASTOLIC BLOOD PRESSURE: 65 MMHG | HEIGHT: 68 IN

## 2020-07-10 DIAGNOSIS — F03.90 DEMENTIA WITHOUT BEHAVIORAL DISTURBANCE, UNSPECIFIED DEMENTIA TYPE (HCC): Primary | ICD-10-CM

## 2020-07-10 PROCEDURE — 4040F PNEUMOC VAC/ADMIN/RCVD: CPT | Performed by: PSYCHIATRY & NEUROLOGY

## 2020-07-10 PROCEDURE — 99214 OFFICE O/P EST MOD 30 MIN: CPT | Performed by: PSYCHIATRY & NEUROLOGY

## 2020-07-10 PROCEDURE — 1036F TOBACCO NON-USER: CPT | Performed by: PSYCHIATRY & NEUROLOGY

## 2020-07-10 PROCEDURE — 1160F RVW MEDS BY RX/DR IN RCRD: CPT | Performed by: PSYCHIATRY & NEUROLOGY

## 2020-07-10 RX ORDER — DONEPEZIL HYDROCHLORIDE 10 MG/1
10 TABLET, FILM COATED ORAL
Qty: 90 TABLET | Refills: 2 | Status: SHIPPED | OUTPATIENT
Start: 2020-07-10 | End: 2021-04-01 | Stop reason: SDUPTHER

## 2020-07-10 NOTE — PROGRESS NOTES
Review of Systems   Constitutional: Negative  Negative for appetite change and fever  HENT: Negative  Negative for hearing loss, tinnitus, trouble swallowing and voice change  Eyes: Negative  Negative for photophobia and pain  Respiratory: Negative  Negative for shortness of breath  Cardiovascular: Negative  Negative for palpitations  Gastrointestinal: Negative  Negative for nausea and vomiting  Endocrine: Negative  Negative for cold intolerance  Genitourinary: Negative  Negative for dysuria, frequency and urgency  Musculoskeletal: Negative  Negative for myalgias and neck pain  Skin: Negative  Negative for rash  Allergic/Immunologic: Negative  Neurological: Positive for dizziness  Negative for tremors, seizures, syncope, facial asymmetry, speech difficulty, weakness, light-headedness, numbness and headaches  Positive for memory problems  Positive for forgetfulness     Hematological: Negative  Does not bruise/bleed easily  Psychiatric/Behavioral: Negative  Negative for confusion, hallucinations and sleep disturbance

## 2020-07-10 NOTE — PATIENT INSTRUCTIONS
· Continue with Aricept  · Repeat MRI Brain NeuroQuant in 12 months as follow up to possible developing dementia  · We discussed about referral to Occupational Therapy for driving evaluation for reassurance about his ability to handle on-the-road challenges  His wife would like to hold off this and will be supervising him  · Continue interaction with family within safety limits  Suggested continue to walk in the park, to continually seek activities to do that will motivate him  He likes fishing and would be interested in outdoors activities  · He will be seeing his psychiatry team soon for his history of depression  Wife does not feel the Celexa is helping enough  · Continue on turmeric (curcumin) 500-750mg daily  · Recommended Mediterranean diet for stroke risk and for memory  · Recommended continuing wearing his hearing aids to avoid missing conversations and issues with carrying on his daily life, adherence to an exercise regimen, sleep and hydration  · He should continue completing crossword puzzles and keeping his mind active     · Return to Clinic on 4 months

## 2020-07-10 NOTE — PROGRESS NOTES
Sleep Study Documentation    Pre-Sleep Study       Sleep testing procedure explained to patient:YES    Patient napped prior to study:NO    Caffeine:Dayshift worker after 12PM   Caffeine use:NO    Alcohol:Dayshift workers after 5PM: Alcohol use:NO    Typical day for patient:YES       Study Documentation    Sleep Study Indications: DREW, Loud Snoring, EDS, PLMS, Memory Loss     Sleep Study: Treatment   Optimal PAP pressure: 10 cm   Leak:Small  Snore:Eliminated  REM Obtained:yes  Supplemental O2: no    Minimum SaO2 87%  Baseline SaO2 97%  PAP mask tried (list all)  ResMed AirFit F20 FFM size large   PAP mask choice (final) Size large ResMed AirFit F20 FFM   PAP mask type:full face  PAP pressure at which snoring was eliminated 10 cm   Minimum SaO2 at final PAP pressure 89%  Mode of Therapy:CPAP  ETCO2:No  CPAP changed to BiPAP:No    Mode of Therapy:CPAP    EKG abnormalities: yes - rare sinus arrhythmia     EEG abnormalities: no    Sleep Study Recorded < 2 hours: N/A    Sleep Study Recorded > 2 hours but incomplete study: N/A    Sleep Study Recorded 6 hours but no sleep obtained: NO    Patient classification: retired       Post-Sleep Study    Medication used at bedtime or during sleep study:NO    Patient reports time it took to fall asleep:less than 20 minutes    Patient reports waking up during study:1 to 2 times  Patient reports returning to sleep in 10 to 30 minutes  Patient reports sleeping 4 to 6 hours without dreaming  Patient reports sleep during study:typical    Patient rated sleepiness: Not sleepy or tired    PAP treatment:yes: Post PAP treatment patient reports feeling unsure if a change is noted and  would wear PAP mask at home

## 2020-07-10 NOTE — LETTER
July 11, 2020     Khang Wirt,   [address]    Patient: Renny Goodwin   YOB: 1955   Date of Visit: 7/10/2020       Dear Dr Onnie Mohs:    Earlier today I saw Mr  Renny Goodwin for evaluation of his dementia / pseudodementia  Below are my notes for this visit for your records and to keep you updated on his health status  If you have questions, please do not hesitate to call me  Sincerely,        Fady Alfaro MD        CC: JENN Zapata  7/10/2020 10:48 AM  Sign at close encounter  Review of Systems   Constitutional: Negative  Negative for appetite change and fever  HENT: Negative  Negative for hearing loss, tinnitus, trouble swallowing and voice change  Eyes: Negative  Negative for photophobia and pain  Respiratory: Negative  Negative for shortness of breath  Cardiovascular: Negative  Negative for palpitations  Gastrointestinal: Negative  Negative for nausea and vomiting  Endocrine: Negative  Negative for cold intolerance  Genitourinary: Negative  Negative for dysuria, frequency and urgency  Musculoskeletal: Negative  Negative for myalgias and neck pain  Skin: Negative  Negative for rash  Allergic/Immunologic: Negative  Neurological: Positive for dizziness  Negative for tremors, seizures, syncope, facial asymmetry, speech difficulty, weakness, light-headedness, numbness and headaches  Positive for memory problems  Positive for forgetfulness     Hematological: Negative  Does not bruise/bleed easily  Psychiatric/Behavioral: Negative  Negative for confusion, hallucinations and sleep disturbance         Fady Alfaro MD  7/11/2020 11:30 AM  Sign at close encounter  [facility]       PATIENT RETURN NOTE    Patient: Renny Goodwin  Medical Record Number: # [id number]  YOB: 1955  Date of visit: 7/10/2020    ASSESSMENT     Problem List Items Addressed This Visit        Nervous and Auditory    Dementia without behavioral disturbance ([facility] [address] ) - Primary    Relevant Medications    donepezil (ARICEPT) 10 mg tablet    Other Relevant Orders    MRI brain NeuroQuant wo and w contrast        Impression of this 78 yo gentleman returning for cognitive changes, improved with SSRI addition but still suffering from a lack of self-confidence in answering questions when challenged  Yet he is unhampered by confidence when doing much of his other tasks  On the days he is "good" he does well  His previous cognitive testing have been of questionable accuracy given poor effort to answer  Today he knew not the year, the location or recalled any of three given words  We explained to patient's wife our concerns for his situation - that while part of what he has may be pseudodementia, that it would be prudent to undergo the Driving Evaluation mentioned earlier and officially limit his driving  We strongly encouraged for them to surrender his driving license in exchange for a state ID card  She was very reluctant to consider the options at this time, stating he has no issues with driving while she has been with him, and would remain with him whenever he drives now  She says she would notify us immediately if there is any concern over his driving ability  Given part of his disability may be pseudodementia as suggested by earlier Neuropsychology evaluation, we will proceed as below and he will see his psychologist in case any improvement can be made  PLAN     · Continue with Aricept in case of underlying early stage organic dementia  · Repeat MRI Brain NeuroQuant in 12 months as follow up to possible developing dementia  · We discussed about referral to Occupational Therapy for driving evaluation for reassurance about his ability to handle on-the-road challenges   His wife would like to hold off this and will be supervising him  · Continue interaction with family within safety limits  Suggested continue to walk in the park, to continually seek activities to do that will motivate him  He likes fishing and would be interested in outdoors activities  · He will be seeing his psychiatry team soon for his history of depression  Wife does not feel the Celexa is helping enough  · Continue on turmeric (curcumin) 500-750mg daily  · Recommended Mediterranean diet for stroke risk and for memory  · Recommended continuing wearing his hearing aids to avoid missing conversations and issues with carrying on his daily life, adherence to an exercise regimen, sleep and hydration  · He should continue completing crossword puzzles and keeping his mind active  · Return to Clinic on 4 months    I spent 40 minutes directly with the patient during this visit and >50% was counseling/coordination of care    Provider located at [address] [place] [address] RD  [address]    Recent Visits  Date Type Provider Dept   07/10/20 Office Visit [place] MD [facility] [address] [facility] recent visits within past 7 days and meeting all other requirements     Future Appointments  No visits were found meeting these conditions  Showing future appointments within next 150 days and meeting all other requirements      Subjective  Galilea Welsh is a 77 y o  R-handed male with notable hx of anxiety/depression who returns for memory issues  Last seen by me 4/7/2020, but saw Vaishnavi Smith in 5/5/20  Interim History  Donepezil apparently caused transient GI upset and complaints of headache  MRI brain was WNL and without suspicious red flag  Today his wife says that he has "good and bad days" with his memory  He does use a CPAP  He says he is now more tolerating the donepezil if taken after food  His memory will immediately decline sharply if stressed   He is to see a psychologist next week  When properly motivated he can remain awake enough to do more daily tasks  Otherwise, without a structured schedule he would sleep excessively  He did get new hearing aids, which has helped his hearing  He does take the dogs for a walk  When asked, she strongly vouches for his driving safety  INITIAL HISTORY   The patient was accompanied today  History was obtained from patient and wife    In the interim, per PCP, he saw Neuropsychiatry who 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  His wife does not think he is depressed any longer, on the citalopram 40mg daily  She feels his memory has worsened in the meantime since early Fall 2019  Previously 8/2018 MMSE 26 and on 11/2019 MMSE 24  Wife feels that his concentration is "Not all there" and he can get confused of the date  She feels that he is still having difficulty in social demeanor and trouble getting words out, but positive change in terms of his interactions  He has seen Sleep Medicine and in addition to old dx of DREW, he has PLMS and RLS  He will be starting on CPAP soon  They called 4/2/20 regarding feeling his memory has since declined, requesting earlier appointment  He denies any hallucinations, paranoia but he does often worry about any action he takes  He has been started on Ativan, which helped  He does have good and bad days cognitively, but physically "has not changed"  He is fearful of making mistakes and less confident in his actions       Past Medical History:   Diagnosis Date    Anxiety     BPH with obstruction/lower urinary tract symptoms     LAST ASSESSED: 9/15/14    Depression     Hx of laceration of skin     LAST ASSESSED: 11/3/14    Hyperlipidemia     Long term use of drug     LAST ASSESSED: 3/8/14    Memory loss     Pneumonia     LAST ASSESSED: 5/12/14    Sleep apnea     Tachycardia     LAST ASSESSED: 3/8/14    Thrombophlebitis of superficial veins of upper extremities     LAST ASSESSED: 3/8/14    Uncomplicated alcohol abuse     LAST ASSESSED: 2/28/15    Weakness of both arms     LAST ASSESSED: 5/12/14    Weakness of both legs     LAST ASSESSED: 5/12/14     Past Surgical History:   Procedure Laterality Date    COLONOSCOPY      COMPLETE    HERNIA REPAIR      SKIN LESION EXCISION       Current Outpatient Medications   Medication Sig Dispense Refill    aspirin 81 MG tablet Take 1 tablet by mouth daily      citalopram (CeleXA) 40 mg tablet Take 1 tablet (40 mg total) by mouth daily 90 tablet 1    cyanocobalamin (VITAMIN B-12) 1000 MCG tablet Take 1,000 mcg by mouth daily      donepezil (ARICEPT) 10 mg tablet Take 1 tablet (10 mg total) by mouth daily at bedtime 90 tablet 2    Flaxseed, Linseed, (FLAX SEED OIL) 1300 MG CAPS Take by mouth      fluticasone (FLONASE) 50 mcg/act nasal spray SPRAY 2 SPRAYS INTO EACH NOSTRIL EVERY DAY 16 mL 1    LORazepam (ATIVAN) 0 5 mg tablet Take 1 tablet (0 5 mg total) by mouth every 8 (eight) hours as needed for anxiety 45 tablet 0    Multiple Vitamin ([employer] DAILY MULTIPLE PO) Take by mouth      pravastatin (PRAVACHOL) 40 mg tablet TAKE 1 TABLET (40 MG TOTAL) BY MOUTH DAILY FOR 90 DAYS 90 tablet 2    Turmeric 500 MG CAPS Take by mouth      Krill Oil 500 MG CAPS Take by mouth       No current facility-administered medications for this visit  Allergies   Allergen Reactions    Fish Oil + D3 [Fish Oil-Cholecalciferol] Rash    Oseltamivir Rash     ROS  Review of Systems   Constitutional: Negative  Negative for appetite change and fever  HENT: Negative  Negative for hearing loss, tinnitus, trouble swallowing and voice change  Eyes: Negative  Negative for photophobia and pain  Respiratory: Negative  Negative for shortness of breath  Cardiovascular: Negative  Negative for palpitations  Gastrointestinal: Negative  Negative for nausea and vomiting  Endocrine: Negative  Negative for cold intolerance  Genitourinary: Negative   Negative for dysuria, frequency and urgency  Musculoskeletal: Negative  Negative for myalgias and neck pain  Skin: Negative  Negative for rash  Allergic/Immunologic: Negative  Neurological: Positive for dizziness  Negative for tremors, seizures, syncope, facial asymmetry, speech difficulty, weakness, light-headedness, numbness and headaches  Positive for memory problems   Positive for forgetfulness   Hematological: Negative  Does not bruise/bleed easily  Psychiatric/Behavioral: Negative  Negative for confusion, hallucinations and sleep disturbance  EXAMINATION    Vitals:    07/10/20 1039   BP: 110/65   BP Location: Left arm   Patient Position: Sitting   Cuff Size: Large   Pulse: 58   Temp: 98 9 °F (37 2 °C)   TempSrc: Tympanic   Weight: 71 7 kg (158 lb)   Height: 5' 8" (1 727 m)       Physical Exam   Constitutional: He is oriented to person, place, and time  He appears well-developed and well-nourished  No distress  HENT:   Head: Normocephalic and atraumatic  Mouth/Throat: No oropharyngeal exudate  Eyes: Conjunctivae and EOM are normal  Right eye exhibits no discharge  Left eye exhibits no discharge  Neck: Normal range of motion  Musculoskeletal: Normal range of motion  Neurological: He is alert and oriented to person, place, and time  No cranial nerve deficit  Skin: Skin is warm  He is not diaphoretic  Psychiatric: He has a normal mood and affect  +hypomimia    Cognitive and Mental Exam:  [facility] [place] 16/30 with 0/5 recall in Oct 2018  [employer] (for telephone virtual interviews) version 1 0 was 3/22 from poor effort in April 2020     Today he is awake and alert but states only his first name when asked his full name, unless prompted further  He does not know the year, but knows it is summer  He knows provider is a doctor but unable to state his current immediate settings  He recalls 0/3 word recall after one minute (cat, ball, tree)   He has trouble with word-pair associations    UPDRSIII                Time since last dose:   4/7/20      Speech  1     Facial Expression  2    Postural Tremor (Right) 0    Postural Tremor (Left) 0    Kinetic Tremor (Right)  0    Kinetic Tremor (Left)  0    Rest tremor amplitude RUE 0    Rest tremor amplitude LUE 0    Rest tremor amplitude RLE 0    Rest tremor amplitude LLE 0    Lip/Jaw Tremor  0    Consistency of tremor 0    Finger Taps (Right)   -    Finger Taps (Left)  -    Hand Movement (Right)  1    Hand Movement (Left)   1    Pronation/Supination (Right)  1    Pronation/Supination (Left)   1    Toe Tapping (Right) 1 slow    Toe Tapping (Left) 1 slow    Leg Agility (Right)  2 very hestitation    Leg Agility (Left)   2 very hesitatnt     Rigidity - Neck  -     Rigidity - Upper Extremity (Right)  -      Rigidity - Upper Extremity (Left)   -     Rigidity - Lower Extremity (Right)  -    Rigidity - Lower Extremity (Left)   -    Arising from Chair   0      Gait   1 slow     Freezing of Gait 0     Postural Stability  -     Posture 0     Global spontaneity of movement 0       Gait: Normal comprehensive gait evaluation, has normal raising, stance, gait, turns, AS, tip-toes, heels and Pull test   +reduced left arm swing compared to R

## 2020-07-11 DIAGNOSIS — G47.33 OSA (OBSTRUCTIVE SLEEP APNEA): Primary | ICD-10-CM

## 2020-07-13 ENCOUNTER — TELEPHONE (OUTPATIENT)
Dept: SLEEP CENTER | Facility: CLINIC | Age: 65
End: 2020-07-13

## 2020-07-13 NOTE — TELEPHONE ENCOUNTER
Received a pressure change  Changed accordingly  Patient's APAP is now set to 10-15cm  Patient was informed

## 2020-07-14 ENCOUNTER — OFFICE VISIT (OUTPATIENT)
Dept: PSYCHIATRY | Facility: CLINIC | Age: 65
End: 2020-07-14
Payer: MEDICARE

## 2020-07-14 DIAGNOSIS — F33.1 MODERATE EPISODE OF RECURRENT MAJOR DEPRESSIVE DISORDER (HCC): Primary | ICD-10-CM

## 2020-07-14 DIAGNOSIS — G31.84 MILD COGNITIVE IMPAIRMENT WITH MEMORY LOSS: ICD-10-CM

## 2020-07-14 DIAGNOSIS — F41.1 GAD (GENERALIZED ANXIETY DISORDER): ICD-10-CM

## 2020-07-14 PROCEDURE — 90792 PSYCH DIAG EVAL W/MED SRVCS: CPT | Performed by: NURSE PRACTITIONER

## 2020-07-14 PROCEDURE — 1036F TOBACCO NON-USER: CPT | Performed by: NURSE PRACTITIONER

## 2020-07-14 RX ORDER — ARIPIPRAZOLE 2 MG/1
1 TABLET ORAL DAILY
Qty: 30 TABLET | Refills: 1 | Status: SHIPPED | OUTPATIENT
Start: 2020-07-14 | End: 2020-08-06 | Stop reason: SDUPTHER

## 2020-07-14 NOTE — PSYCH
Outpatient Psychiatry Intake Exam       PCP: Tracie Grajeda DO    Referral source: PCP-Dr Esther Hyman    Identifying information:  Marisela Caal is a 77 y o  male with a history of MDD as well as possible neurocognitive disorder here for evaluation and determination of mental health management needs  Sources of information:   Information for this evaluation was gathered through direct interview with the patient  Confidentiality discussion: Limits of confidentiality in cases of safety concerns involving self and others as well as this physician's role as a mandatory  of abuse  They voiced understanding and a desire to continue with the evaluation  SUBJECTIVE     Chief Complaint / reason for visit: "There may be underlying depression, as the neurologist didn't see brain changes on the MRI" (per patient wife, Yoselyn Russell)  History of Present Illness:  Marisela Caal is a 77year old accompanied by his wife, Yoselyn Russell  He has been taking Celexa some time from his neurologist though has not been formally diagnosed with MDD, as it appears 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  Per Melina López has been somewhat helpful but Hadley Sanches struggles with lack of motivation and through descriptors, has many challenges with overall executive functioning  Hadley Sanches is able to answer simple questions though does depend on Yoselyn Russell to answer many of the more comprehensive questions regarding his mental health  Prior to retiring about four years ago, Hadley Sanches did not struggle with anxiety or depression though since that time both Yoselyn Russell and Hadley Leohire have noticed a decline  He is not as physically active and Yoselyn Russell states Hadley Sanches does not do any activities on his own  Hadley Sanches denies psychotic symptoms as well as SI/HI though Yoselyn Russell states he has passive SI at times when he becomes frustrated with his memory, word-finding, or completing tasks   There is also concern about Ryan shaking (legs, hands); they are unsure if this is related to anxiety or a neurological issue but hope it could subside with medications/lessened anxiety  Stressors: declining memory    HPI ROS:  Medication Side Effects: denied  Depression: high/10 (10 worst)  Anxiety: high/10 (10 worst)  Safety (SI, HI, other): passive at times  Sleep: sleeping well with CPAP  Energy: good  Appetite: normal, appetite  Weight Change: none    In terms of depression, the patient endorses depressed mood, loss of energy, loss of interests/pleasure, thoughts about death or suicide, thoughts of worthlessness or guilt, trouble concentrating     In terms of chino, the patient endorses no  Symptoms include no symptoms    EVELIA symptoms: difficulty concentrating, fatigue, insomnia, irritable and restlessness/keyed up  Panic Disorder symptoms: no symptoms suggestive of panic disorder  Social Anxiety symptoms: no symptoms suggestive of social anxiety  OCD Symptoms: No significant symptoms supportive of OCD  Eating Disorder symptoms: no historical or current eating disorder  In terms of PTSD, the patient denies exposure to trauma  In terms of psychotic symptoms, the patient denies  Past Psychiatric History  Previous diagnoses include   Prior outpatient psychiatric treatment:   Prior therapy: only with family (daughter) when she was young  Prior inpatient psychiatric treatment: none-overnight OBS for anxiety/cardiac  Prior suicide attempts: none  Prior self harm: none  Prior violence or aggression: denied    Previous psychotropic medication trials:   Celexa (current), Aricept, Ativan PRN    Social History:    The patient grew up around [place], [place]   Childhood was described as "fun, active"  During childhood, parents were   Since    They have one brother and one sister; Jeanette Kennedy is the youngest      Abuse/neglect: none    As far as the patient (or present family member) is aware, overall childhood development: Patient does ascribe to normal developmental milestones such as walking, talking, potty training and making childhood friends  Education level: HS  Current occupation: retired-[employer]  Marital status: -Arabella  Children: 3 girls  Current Living Situation: the patient currently lives with his wife  Social support: wife    Taoism Affiliation: seldom, [facility] [place]   experience: none  Legal history: none  Access to Guns: locked at home    Substance use and treatment:  Tobacco use: quit 18 years ago  Caffeine Use: 1/2 caf-2 cups    Likes soda, Snapple  ETOH use: rare  Other substance use: none  Endorses previous experimentation with: none    Longest clean time: not applicable  History of Inpatient/Outpatient rehabilitation program: no      Traumatic History:     Abuse: none  Other Traumatic Events: father  at cement [employer], cousin  at [facility] (electrocuted to death)     Family Psychiatric History:     Psychiatric Illness:  Mom-dementia    Family History   Problem Relation Age of Onset    Arthritis Mother     Hyperlipidemia Mother     Dementia Mother         started late 76s    Tinnitus Mother     Arthritis Family     Hyperlipidemia Family     Anxiety disorder Sister             Past Medical / Surgical History:    Current PCP: Katie Hardy DO   Other providers include: Dr Joyce Ayala (neurology)      Patient Active Problem List   Diagnosis    Anxiety    Backache    Benign prostatic hyperplasia    Cough    Depression with anxiety    Dizziness    Dyspnea    Elevated ALT measurement    Elevated AST (SGOT)    Fatigue    Hyperlipidemia    Conductive hearing loss    Malignant melanoma of skin ([address] )    Memory loss    Pain in ankle joint    Pain of upper extremity    Palpitation    Sleep apnea    Symptoms involving urinary system    Vitamin D deficiency    Mild cognitive impairment with memory loss    Major depressive disorder, recurrent, moderate ([address] )    DREW (obstructive sleep apnea)    Dementia without behavioral disturbance ([address] )    EVELIA (generalized anxiety disorder)       Past Medical History-  Past Medical History:   Diagnosis Date    Anxiety     BPH with obstruction/lower urinary tract symptoms     LAST ASSESSED: 9/15/14    Depression     Hx of laceration of skin     LAST ASSESSED: 11/3/14    Hyperlipidemia     Long term use of drug     LAST ASSESSED: 3/8/14    Memory loss     Pneumonia     LAST ASSESSED: 5/12/14    Sleep apnea     Tachycardia     LAST ASSESSED: 3/8/14    Thrombophlebitis of superficial veins of upper extremities     LAST ASSESSED: 3/5/71    Uncomplicated alcohol abuse     LAST ASSESSED: 2/28/15    Weakness of both arms     LAST ASSESSED: 5/12/14    Weakness of both legs     LAST ASSESSED: 5/12/14      History of Seizures: no  History of Head injury-LOC-Concussion: no    Past Surgical History-  Past Surgical History:   Procedure Laterality Date    COLONOSCOPY      COMPLETE    HERNIA REPAIR      SKIN LESION EXCISION            Allergies: Allergies   Allergen Reactions    Fish Oil + D3 [Fish Oil-Cholecalciferol] Rash    Oseltamivir Rash       Recent labs:  Orders Only on 07/03/2020   Component Date Value    SARS-CoV-2  07/03/2020 Not Detected      Labs were reviewed    Medical Review Of Systems:     A comprehensive review of systems was negative        Medications:  Current Outpatient Medications on File Prior to Visit   Medication Sig Dispense Refill    aspirin 81 MG tablet Take 1 tablet by mouth daily      citalopram (CeleXA) 40 mg tablet Take 1 tablet (40 mg total) by mouth daily 90 tablet 1    cyanocobalamin (VITAMIN B-12) 1000 MCG tablet Take 1,000 mcg by mouth daily      donepezil (ARICEPT) 10 mg tablet Take 1 tablet (10 mg total) by mouth daily at bedtime 90 tablet 2    Flaxseed, Linseed, (FLAX SEED OIL) 1300 MG CAPS Take by mouth      fluticasone (FLONASE) 50 mcg/act nasal spray SPRAY 2 SPRAYS INTO EACH NOSTRIL EVERY DAY 16 mL 1    Krill Oil 500 MG CAPS Take by mouth      LORazepam (ATIVAN) 0 5 mg tablet Take 1 tablet (0 5 mg total) by mouth every 8 (eight) hours as needed for anxiety 45 tablet 0    Multiple Vitamin ([facility] DAILY MULTIPLE PO) Take by mouth      pravastatin (PRAVACHOL) 40 mg tablet TAKE 1 TABLET (40 MG TOTAL) BY MOUTH DAILY FOR 90 DAYS 90 tablet 2    Turmeric 500 MG CAPS Take by mouth       No current facility-administered medications on file prior to visit  Medication Compliant? All current active medications have been reviewed  Objective     OBJECTIVE     There were no vitals taken for this visit  MENTAL STATUS EXAM  Appearance:  age appropriate, dressed casually   Behavior:  pleasant, cooperative, with good eye contact   Speech:  Normal volume, regular rate and rhythm   Mood:  depressed and anxious   Affect:  mood congruent   Language: intact and appropriate for age, education, and intellect   Thought Process:  Linear and goal directed   Associations: intact associations   Thought Content:  normal and appropriate   Perceptual Disturbances: no auditory or visual hallcunations   Risk Potential / Abnormal Thoughts: Suicidal ideation - None  Homicidal ideation - None  Potential for aggression - No       Consciousness:  Alert & Awake   Sensorium:  Oriented to person   Attention: attention span and concentration are age appropriate   Intellect: within normal limits   Fund of Knowledge:  Memory: awareness of current events: limited  short term memory impaired, long term memory impaired   Insight:  limited   Judgment: limited   Muscle Strength Muscle Tone: normal  normal   Gait/Station: normal gait/station with good balance   Motor Activity: no abnormal movements     Pain mild   Pain Scale 3     IMPRESSIONS/FORMULATION          MDD  EVELIA    Confidential Assessment: Through discussion with both Geno Cifuentes and Angeles Cabrera, it appears that Angeles Cabrera does struggle with underlying MDD and EVELIA    Though unable to fully describe his thoughts and actions, he does state "I just don't feel like doing a lot"   Focus, activation, action, memory are all affected, and emotions are slightly dysregulated  Anxiety is high, likely due to constant difficulty with executive functions  Will try Abilify 2 mg in addition to Celexa  Scales:  Depression: high  Anxiety: high    Saint Cherry is a 77 y o  male who presents with symptoms supporting the aforementioned  Suicide / Homicide / Safety risk assessment: Safety risk low overall upon consideration of protective and risk factors  Plan:       Education about diagnosis and treatment modalities, patient voiced understanding and agreement with the following plan:    Discussed medication risks, beneftis, alternatives  Patient was informed and had time to ask questions  They agreed to treatment below    Controlled Medication Discussion:     Patient using medication appropriately-Ativan 0 5 mg from Dr Loretta Welch    Initial treatment plan:   1) MEDS:    - Continue Celexa 40 mg (Dr Alondra Augustin)   - Add Abilify 2 mg (starting with 1 mg for one week, then increasing to whole tablet)   - Continue Donepezil 10 mg HS (Dr Alondra Augustin)    2) Labs: none needed    3) Therapy:    - n/a    4) Medical:    - Pt will f/u with other providers as needed    5) Other: Support as needed    6) Follow up:   - Follow up in 3 weeks   - Patient will call if issues or concerns     7) Treatment Plan:    - Enacted, next due 1/10/21    Discussed self monitoring of symptoms, and symptom monitoring tools  Patient has been informed of 24 hours and weekend coverage for urgent situations accessed by calling the main clinic phone number or calling 911 or getting to ED for immediate medical attention or suicidality            CATHRYN Cortes  7/14/2020  I have spent 60 minutes with Patient and family today in which greater than 50% of this time was spent in counseling/coordination of care regarding Prognosis, Risks and benefits of tx options, Intructions for management, Patient and family education, Importance of tx compliance, Risk factor reductions and Impressions

## 2020-07-14 NOTE — BH TREATMENT PLAN
TREATMENT PLAN (Medication Management Only)        [address]    Name and Date of Birth:  Oksana Barakat 77 y o  1955  Date of Treatment Plan: July 14, 2020  Diagnosis/Diagnoses:    1  Moderate episode of recurrent major depressive disorder ([address] )    2  Mild cognitive impairment with memory loss    3  EVELIA (generalized anxiety disorder)      Strengths/Personal Resources for Self-Care: supportive family, taking medications as prescribed, ability to reason, I am proud of the houses we have kept up, I like my mustang  Area/Areas of need (in own words): "anxiety, memory"  1  Long Term Goal: I want to increase my confidence, motivation  Less shakes  Target Date: 2 months - 9/14/2020  Person/Persons responsible for completion of goal: CATHRYN Moseley  2  Short Term Objective (s) - How will we reach this goal?:   A  Provider new recommended medication/dosage changes and/or continue medication(s): Continue Celexa and Aricept  B  Begin Abilify 2 mg   C  N/A  Target Date: 6 months - 1/14/2021  Person/Persons Responsible for Completion of Goal: CATHRYN Moseley  Progress Towards Goals: starting treatment  Treatment Modality: medication management every 4 weeks  Review due 90 to 120 days from date of this plan: 6 months  Expected length of service: ongoing treatment  My Physician/PA/NP and I have developed this plan together and I agree to work on the goals and objectives  I understand the treatment goals that were developed for my treatment

## 2020-07-16 ENCOUNTER — TELEPHONE (OUTPATIENT)
Dept: SLEEP CENTER | Facility: CLINIC | Age: 65
End: 2020-07-16

## 2020-07-16 NOTE — TELEPHONE ENCOUNTER
----- Message from Betty Roberts MD sent at 7/11/2020 10:15 AM EDT -----  Change to APAP 10-15 cm   Tx  Also, I need to see in clinic to start treatment for PLMD  tx

## 2020-07-24 DIAGNOSIS — J30.2 SEASONAL ALLERGIES: ICD-10-CM

## 2020-07-24 RX ORDER — FLUTICASONE PROPIONATE 50 MCG
SPRAY, SUSPENSION (ML) NASAL
Qty: 16 ML | Refills: 1 | Status: SHIPPED | OUTPATIENT
Start: 2020-07-24 | End: 2020-08-16 | Stop reason: SDUPTHER

## 2020-08-06 ENCOUNTER — OFFICE VISIT (OUTPATIENT)
Dept: PSYCHIATRY | Facility: CLINIC | Age: 65
End: 2020-08-06
Payer: MEDICARE

## 2020-08-06 DIAGNOSIS — F41.1 GAD (GENERALIZED ANXIETY DISORDER): ICD-10-CM

## 2020-08-06 DIAGNOSIS — G31.84 MILD COGNITIVE IMPAIRMENT WITH MEMORY LOSS: ICD-10-CM

## 2020-08-06 DIAGNOSIS — F33.1 MODERATE EPISODE OF RECURRENT MAJOR DEPRESSIVE DISORDER (HCC): Primary | ICD-10-CM

## 2020-08-06 PROCEDURE — 99214 OFFICE O/P EST MOD 30 MIN: CPT | Performed by: NURSE PRACTITIONER

## 2020-08-06 RX ORDER — ARIPIPRAZOLE 2 MG/1
2 TABLET ORAL DAILY
Qty: 90 TABLET | Refills: 1 | Status: SHIPPED | OUTPATIENT
Start: 2020-08-06 | End: 2020-10-29 | Stop reason: DRUGHIGH

## 2020-08-06 NOTE — PSYCH
MEDICATION MANAGEMENT NOTE        [address]      Name and Date of Birth:  Abisai Tierney 77 y o  1955 MRN: [id number]    Date of Visit: August 6, 2020    SUBJECTIVE (HPI):    Ilan Manuel is seen today for a follow up for Major Depressive Disorder and Generalized Anxiety Disorder  He had started Abilify 2 mg at our last appointment and today states he feels good most days  His wife is present and states she has noticed he has a bit more energy and is less fatigued  He has a bit more interest in household activities as well as in getting out of the house  They have been doing some landscaping at the house and he is enjoying that  Fide Machuca reports that his short term memory seems slightly improved and is happy with the small changes she has noticed since Ilan Manuel started Abilify  Sleep is overall poor though CPAP is ill-fitting and they will follow up with sleep medicine in a few weeks  Fide Machuca also states that Ilan Manuel is tapping/shaking his legs a bit less which is also positive  HPI ROS:             ('was' notes: recent => remote)  Medication Side Effects:  denied  denied   Depression (10 worst): moderate Was high   Anxiety (10 worst): moderate Was high   Safety concerns (SI, HI, etc): denied Was passive   Sleep: Difficulty with fit of CPAP, poor sleep Was sleeping well with CPAP   Energy: Some improvement Was good   Appetite: normal Was normal   Weight Change: none none       Review Of Systems:    Comprehensive physical health review of systems negative except those noted in HPI      The following portions of the patient's history were reviewed and updated as appropriate: past family history, past medical history, past social history, past surgical history and problem list     Past Psychiatric History:   Previous diagnoses include   Prior outpatient psychiatric treatment:   Prior therapy: only with family (daughter) when she was young  Prior inpatient psychiatric treatment: none-overnight OBS for anxiety/cardiac  Prior suicide attempts: none  Prior self harm: none  Prior violence or aggression: denied     Previous psychotropic medication trials:   Celexa (current), Aricept, Ativan PRN     Social History:     The patient grew up around [place], [place]   Childhood was described as "fun, active"  During childhood, parents were   Since   They have one brother and one sister; Edie Webb is the youngest       Abuse/neglect: none     As far as the patient (or present family member) is aware, overall childhood development: Patient does ascribe to normal developmental milestones such as walking, talking, potty training and making childhood friends  Education level:   Current occupation: retired-[employer]  Marital status: -Arabella  Children: 3 girls  Current Living Situation: the patient currently lives with his wife  Social support: wife     Sabianism Affiliation: seldom, [facility] [place]   experience: none  Legal history: none  Access to Guns: locked at home     Substance use and treatment:  Tobacco use: quit 18 years ago  Caffeine Use: 1/2 caf-2 cups    Likes soda, Snapple  ETOH use: rare  Other substance use: none  Endorses previous experimentation with: none     Longest clean time: not applicable  History of Inpatient/Outpatient rehabilitation program: no        Traumatic History:      Abuse: none  Other Traumatic Events: father  at cement mill, cousin  at [employer] (electrocuted to death)      Family Psychiatric History:      Psychiatric Illness:      Mom-dementia      Past Medical History:    Past Medical History:   Diagnosis Date    Anxiety     BPH with obstruction/lower urinary tract symptoms     LAST ASSESSED: 9/15/14    Depression     Hx of laceration of skin     LAST ASSESSED: 11/3/14    Hyperlipidemia     Long term use of drug     LAST ASSESSED: 3/8/14    Memory loss     Pneumonia     LAST ASSESSED: 14    Sleep apnea     Tachycardia     LAST ASSESSED: 3/8/14    Thrombophlebitis of superficial veins of upper extremities     LAST ASSESSED: 0/9/01    Uncomplicated alcohol abuse     LAST ASSESSED: 2/28/15    Weakness of both arms     LAST ASSESSED: 5/12/14    Weakness of both legs     LAST ASSESSED: 5/12/14     No past medical history pertinent negatives    Past Surgical History:   Procedure Laterality Date    COLONOSCOPY      COMPLETE    HERNIA REPAIR      SKIN LESION EXCISION       Allergies   Allergen Reactions    Fish Oil + D3 [Fish Oil-Cholecalciferol] Rash    Oseltamivir Rash       Substance Abuse History:    Social History     Substance and Sexual Activity   Alcohol Use Yes    Frequency: 2-3 times a week    Drinks per session: 1 or 2    Binge frequency: Never    Comment: BEING A SOCIAL DRINKER     Social History     Substance and Sexual Activity   Drug Use No       Social History:    Social History     Socioeconomic History    Marital status: /Civil Union     Spouse name: Not on file    Number of children: 3    Years of education: 12+    Highest education level: Not on file   Occupational History    Occupation: retired   Social Needs    Financial resource strain: Somewhat hard   Mario-Maria R insecurity     Worry: Not on file     Inability: Not on file    Transportation needs     Medical: Not on file     Non-medical: Not on file   Tobacco Use    Smoking status: Former Smoker    Smokeless tobacco: Never Used    Tobacco comment: quit 20 years ago   Substance and Sexual Activity    Alcohol use: Yes     Frequency: 2-3 times a week     Drinks per session: 1 or 2     Binge frequency: Never     Comment: BEING A SOCIAL DRINKER    Drug use: No    Sexual activity: Not on file   Lifestyle    Physical activity     Days per week: Not on file     Minutes per session: Not on file    Stress: Not on file   Relationships    Social connections     Talks on phone: Not on file     Gets together: Not on file Attends Buddhist service: Not on file     Active member of club or organization: Not on file     Attends meetings of clubs or organizations: Not on file     Relationship status: Not on file    Intimate partner violence     Fear of current or ex partner: Not on file     Emotionally abused: Not on file     Physically abused: Not on file     Forced sexual activity: Not on file   Other Topics Concern    Not on file   Social History Narrative    Not on file       Family Psychiatric History:     Family History   Problem Relation Age of Onset    Arthritis Mother     Hyperlipidemia Mother     Dementia Mother         started late 76s    Tinnitus Mother     Arthritis Family     Hyperlipidemia Family     Anxiety disorder Sister                 OBJECTIVE:     Vital signs in last 24 hours: There were no vitals filed for this visit      Mental Status Evaluation:    Appearance age appropriate, casually dressed   Behavior cooperative, calm   Speech normal rate, normal volume, normal pitch   Mood normal   Affect normal range and intensity, appropriate   Thought Processes organized, goal directed   Associations intact associations   Thought Content no overt delusions   Perceptual Disturbances: no auditory hallucinations, no visual hallucinations   Abnormal Thoughts  Risk Potential Suicidal ideation - None  Homicidal ideation - None  Potential for aggression - No   Orientation oriented to person, place, time/date and situation   Memory recent memory impaired, remote memory mildly impaired   Consciousness alert and awake   Attention Span Concentration Span attention span and concentration are age appropriate   Intellect appears to be of average intelligence   Insight fair   Judgement fair   Muscle Strength and  Gait normal muscle strength and normal muscle tone, normal gait and normal balance   Motor activity no abnormal movements   Language no difficulty naming common objects, no difficulty repeating a phrase, no difficulty writing a sentence   Fund of Knowledge adequate knowledge of current events  adequate fund of knowledge regarding past history  adequate fund of knowledge regarding vocabulary    Pain none   Pain Scale 0       Laboratory Results:   Recent Labs (last 6 months):   Orders Only on 07/03/2020   Component Date Value    SARS-CoV-2  07/03/2020 Not Detected    Appointment on 06/11/2020   Component Date Value    Sodium 06/11/2020 137     Potassium 06/11/2020 3 6     Chloride 06/11/2020 103     CO2 06/11/2020 28     ANION GAP 06/11/2020 6     BUN 06/11/2020 16     Creatinine 06/11/2020 0 94     Glucose 06/11/2020 69     Calcium 06/11/2020 9 0     AST 06/11/2020 24     ALT 06/11/2020 34     Alkaline Phosphatase 06/11/2020 95     Total Protein 06/11/2020 7 0     Albumin 06/11/2020 3 8     Total Bilirubin 06/11/2020 0 56     eGFR 06/11/2020 84     Cholesterol 06/11/2020 187     Triglycerides 06/11/2020 61     HDL, Direct 06/11/2020 74     LDL Calculated 06/11/2020 101*    Vit D, 25-Hydroxy 06/11/2020 55 8     TSH 3RD GENERATON 06/11/2020 1 760     Vitamin B-12 06/11/2020 1,996*    HIV-1/HIV-2 Ab 06/11/2020 Non-Reactive     Hepatitis C Ab 06/11/2020 Non-reactive     Arsenic 06/11/2020 5     Mercury 06/11/2020 None Detected     Lead Blood 06/11/2020 None Detected     RPR 06/11/2020 Non-Reactive      I have personally reviewed all pertinent laboratory/tests results  No results found for this or any previous visit  Confidential Assessment:  Adelita Cortez is unable to note any changes in his mental health since starting Abilify though his wife Tuyet Cantrell does feel like it has been helpful for his energy, motivation, and focus  Will continue 2 mg at this time  Scales:  Depression: Moderate  Anxiety: moderate         Assessment/Plan:    EVELIA  MDD  MCI    Treatment Recommendations/Precautions/Plan:    Patient has been educated about their diagnosis and treatment modalities   They voiced understanding and agreement with the following plan:    Continue Abilify 2 mg  Continue Celexa 40 mg (from Dr Esther Hyman)    Medication management every 4 weeks  Aware of 24 hour and weekend coverage for urgent situations accessed by calling [facility] main practice number    -Discussed self monitoring of symptoms, and symptom monitoring tools     -Patient has been informed of 24 hours and weekend coverage for urgent situations accessed by calling the main clinic phone number      -Completed and signed during the session: Not applicable - Treatment Plan not due at this session  Due 1/10/21    Risks/Benefits      Risks, Benefits And Possible Side Effects Of Medications:    Risks, benefits, and possible side effects of medications explained to Hadley Snaches and he verbalizes understanding and agreement for treatment  Controlled Medication Discussion:     Not applicable    Psychotherapy Provided:     Individual psychotherapy provided: Yes  Medications, treatment progress and treatment plan reviewed with Hadley Sanches and Yoselyn Russell  Medication education provided to [employer]  Reassurance and supportive therapy provided  CATHRYN Deleon 08/06/20    I have spent 30 minutes with Patient  today in which greater than 50% of this time was spent in counseling/coordination of care regarding Prognosis, Risks and benefits of tx options, Intructions for management, Patient and family education, Importance of tx compliance, Risk factor reductions and Impressions

## 2020-08-16 DIAGNOSIS — J30.2 SEASONAL ALLERGIES: ICD-10-CM

## 2020-08-16 RX ORDER — FLUTICASONE PROPIONATE 50 MCG
SPRAY, SUSPENSION (ML) NASAL
Qty: 16 ML | Refills: 1 | Status: SHIPPED | OUTPATIENT
Start: 2020-08-16 | End: 2020-08-17 | Stop reason: SDUPTHER

## 2020-08-17 DIAGNOSIS — J30.2 SEASONAL ALLERGIES: ICD-10-CM

## 2020-08-17 RX ORDER — FLUTICASONE PROPIONATE 50 MCG
2 SPRAY, SUSPENSION (ML) NASAL DAILY
Qty: 16 ML | Refills: 3 | Status: SHIPPED | OUTPATIENT
Start: 2020-08-17 | End: 2020-11-12

## 2020-09-03 ENCOUNTER — TELEPHONE (OUTPATIENT)
Dept: UROLOGY | Facility: AMBULATORY SURGERY CENTER | Age: 65
End: 2020-09-03

## 2020-09-03 NOTE — TELEPHONE ENCOUNTER
Faxed records request from [facility] to [facility] on 9-3-20

## 2020-09-14 ENCOUNTER — OFFICE VISIT (OUTPATIENT)
Dept: PSYCHIATRY | Facility: CLINIC | Age: 65
End: 2020-09-14
Payer: MEDICARE

## 2020-09-14 DIAGNOSIS — F33.1 MAJOR DEPRESSIVE DISORDER, RECURRENT, MODERATE (HCC): Primary | ICD-10-CM

## 2020-09-14 DIAGNOSIS — F03.90 DEMENTIA WITHOUT BEHAVIORAL DISTURBANCE, UNSPECIFIED DEMENTIA TYPE (HCC): ICD-10-CM

## 2020-09-14 DIAGNOSIS — Z79.899 ENCOUNTER FOR LONG-TERM (CURRENT) USE OF OTHER MEDICATIONS: ICD-10-CM

## 2020-09-14 DIAGNOSIS — F41.1 GAD (GENERALIZED ANXIETY DISORDER): ICD-10-CM

## 2020-09-14 PROCEDURE — 99214 OFFICE O/P EST MOD 30 MIN: CPT | Performed by: PHYSICIAN ASSISTANT

## 2020-09-14 RX ORDER — LORAZEPAM 0.5 MG/1
0.5 TABLET ORAL DAILY PRN
Refills: 0 | COMMUNITY
Start: 2020-09-14 | End: 2021-01-01 | Stop reason: SDUPTHER

## 2020-09-14 NOTE — PSYCH
MEDICATION MANAGEMENT NOTE        [address]  [facility] [place]  [address] [facility]  [place] [address] [phone]        Name and Date of Birth:  Chema Hutchins 77 y o  1955    Date of Visit: September 14, 2020    SUBJECTIVE:     Adelita Cortez seen with his wife Tuyet Cantrell  Last seen by Laura Alexis 8/6 at which time meds unchanged  Is also followed by neuro (dr Terral Severin) and last seen by them in July  Adelita Cortez just returned from [place] for 3 weeks  He has been a bit more anxious and impatient recently  Recent stressor:  His 81 yo mother fell and "broke her neck"- she is in ICU  Adelita Cortez  Was able to go and see her  Adelita Cortez says his mood is sometimes low with crying spells (wife has not seen these) and reports fleeting SI- no intent or plan- family protective  Sleep is usually good with CPAP  Without it he is more restless at night  Appetite is ok  Stays active with his family in the community, will talk with others socially, visits his children  Says he worries about his marriage (he and Tuyet Cantrell  over 36 yrs) and finances Taylor Servant says there is no worries here)  Enjoys working on his [employer]  Review of Systems   HENT: Positive for hearing loss  Neurological: Negative for tremors  No gait problems         Psychiatric History      Has been coming to this office since 7/14/20  No hx of IP or suicide attempts  Trauma History      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  Social History       Retired from [address] [place] [address] about 3 yrs ago after working there over 36 yrs  Says he misses some of his fellow workers  Occ sees them in the community  Has 3 daughters  Lives with his wife Tuyet Cantrell         OBJECTIVE:     MENTAL STATUS EXAM  Appearance:  dressed casually, looks older than stated age   Behavior:  Pleasant & cooperative; often looks to wife to answer questions   Speech:  Normal volume, regular rate and rhythm   Mood:  low/anxious   Affect:  constricted   Language: intact and appropriate for age, education, and intellect   Thought Process:  goal directed   Associations: intact associations   Thought Content:  no overt delusions   Perceptual Disturbances: no auditory or visual hallcunations   Risk Potential / Abnormal Thoughts: Suicidal ideation - Yes, but fleeting thougths that are easily controlled  Homicidal ideation - None  Potential for aggression - No       Consciousness:  Alert & Awake   Sensorium:  Grossly oriented   Attention: attention span and concentration appear shorter than expected for age       Fund of Knowledge:  Memory: awareness of current events: yes  short term memory impaired   Insight:  fair   Judgment: fair   Muscle Strength Muscle Tone: Grossly normal  normal   Gait/Station: normal gait/station with good balance   Motor Activity: no abnormal movements       Lab Review: I have reviewed all pertinent labs  Lab Results   Component Value Date    CHOLESTEROL 187 06/11/2020     Lab Results   Component Value Date    HDL 74 06/11/2020     Lab Results   Component Value Date    TRIG 61 06/11/2020       Lab Results   Component Value Date     09/07/2017    SODIUM 137 06/11/2020    K 3 6 06/11/2020     06/11/2020    CO2 28 06/11/2020    ANIONGAP 8 02/16/2014    AGAP 6 06/11/2020    BUN 16 06/11/2020    CREATININE 0 94 06/11/2020    GLUC 69 06/11/2020    GLUF 95 11/01/2019    CALCIUM 9 0 06/11/2020    AST 24 06/11/2020    ALT 34 06/11/2020    ALKPHOS 95 06/11/2020    PROT 6 3 09/07/2017    TP 7 0 06/11/2020    BILITOT 0 5 09/07/2017    TBILI 0 56 06/11/2020    EGFR 84 06/11/2020     Lab Results   Component Value Date    SHNDFYLX03 1,996 (H) 06/11/2020       Lab Results   Component Value Date    WEB5JWHIJTSP 1 760 06/11/2020           ASSESSMENT & PLAN          Diagnoses and all orders for this visit:    Encounter for long-term (current) use of other medications  -     ECG 12 lead; Future    Major depressive disorder, recurrent, moderate (HCC)    EVELIA (generalized anxiety disorder)    Dementia without behavioral disturbance, unspecified dementia type ([facility] )    Other orders  -     LORazepam (ATIVAN) 0 5 mg tablet; Take 1 tablet (0 5 mg total) by mouth daily as needed for anxiety      Current Outpatient Medications   Medication Sig Dispense Refill    ARIPiprazole (ABILIFY) 2 mg tablet Take 1 tablet (2 mg total) by mouth daily 90 tablet 1    aspirin 81 MG tablet Take 1 tablet by mouth daily      citalopram (CeleXA) 40 mg tablet Take 1 tablet (40 mg total) by mouth daily 90 tablet 1    cyanocobalamin (VITAMIN B-12) 1000 MCG tablet Take 1,000 mcg by mouth daily      donepezil (ARICEPT) 10 mg tablet Take 1 tablet (10 mg total) by mouth daily at bedtime 90 tablet 2    Flaxseed, Linseed, (FLAX SEED OIL) 1300 MG CAPS Take by mouth      fluticasone (FLONASE) 50 mcg/act nasal spray 2 sprays into each nostril daily 16 mL 3    Krill Oil 500 MG CAPS Take by mouth      LORazepam (ATIVAN) 0 5 mg tablet Take 1 tablet (0 5 mg total) by mouth daily as needed for anxiety  0    Multiple Vitamin ([facility] DAILY MULTIPLE PO) Take by mouth      pravastatin (PRAVACHOL) 40 mg tablet TAKE 1 TABLET (40 MG TOTAL) BY MOUTH DAILY FOR 90 DAYS 90 tablet 2    Turmeric 500 MG CAPS Take by mouth       No current facility-administered medications for this visit  Plan:       Continue abilify 2 mg/d and celexa 40 mg/d  Neuro is prescribing aricept 10 mg/d  EKG ordered  Reviewed risks, benefits, side effects of medications, including no medication  Patient understands and agrees to treatment plan  Return to office one month, sooner prn  Patient has been informed of 24 hours and weekend coverage for urgent situations accessed by calling the main clinic phone number       Billy Anne PA-C

## 2020-09-24 NOTE — TELEPHONE ENCOUNTER
SHE STATED THAT HE'S DOING BETTER  THAT TODAY HE IS OUT AND ABOUT  BUT SHE ALSO STATED THAT ONE OF THE TWO MEDICATIONS IS "KNOCKING HIM OUT"  SHE STATED THAT HE'LL NAP FOR HOURS AT A TIME  SHE ALSO SAID THAT SHE HAS NO IDEA WHAT THE TWO MEDICATIONS ARE BUT SHE THINKS THE ONE THAT HE TAKES 1/2 TABLET THREE TIMES A DAY, IS THE ONE THAT'S DOING It   PLEASE ADVISE     [facility] #[phone] [facility] of Hospital Medicine  Saskia Bauer MD  spectra: [phone]    Patient is a 64y old  Female who presents with a chief complaint of Patient was admitted with history of lower extremity radiculopathy. Patient has previous hx of spinal laminectomies. (23 Sep 2020 11:31)      SUBJECTIVE / OVERNIGHT EVENTS: no acute events  ADDITIONAL REVIEW OF SYSTEMS: patient is overall doing better but endorses increased pain especially on ambulation.     MEDICATIONS  (STANDING):  carvedilol 25 milliGRAM(s) Oral every 12 hours  diazepam    Tablet 2 milliGRAM(s) Oral every 6 hours  enoxaparin Injectable 40 milliGRAM(s) SubCutaneous daily  gabapentin 300 milliGRAM(s) Oral daily  influenza   Vaccine 0.5 milliLiter(s) IntraMuscular once  lidocaine 1% Injectable 0.2 milliLiter(s) Local Injection once  multivitamin 1 Tablet(s) Oral daily  pantoprazole   Suspension 40 milliGRAM(s) Oral daily  polyethylene glycol 3350 17 Gram(s) Oral two times a day  senna 2 Tablet(s) Oral at bedtime  silver sulfADIAZINE 1% Cream 1 Application(s) Topical every 8 hours  sodium chloride 0.9% lock flush 3 milliLiter(s) IV Push every 8 hours    MEDICATIONS  (PRN):  acetaminophen   Tablet .. 650 milliGRAM(s) Oral every 6 hours PRN Temp greater or equal to 38C (100.4F), Mild Pain (1 - 3)  aluminum hydroxide/magnesium hydroxide/simethicone Suspension 30 milliLiter(s) Oral every 4 hours PRN Dyspepsia  HYDROmorphone  Injectable 0.5 milliGRAM(s) IV Push daily PRN Breakthrough  naloxone Injectable 0.1 milliGRAM(s) IV Push every 3 minutes PRN For ANY of the following changes in patient status:  A. RR LESS THAN 10 breaths per minute, B. Oxygen saturation LESS THAN 90%, C. Sedation score of 6  ondansetron Injectable 4 milliGRAM(s) IV Push every 6 hours PRN Nausea  oxyCODONE    IR 5 milliGRAM(s) Oral every 4 hours PRN Moderate Pain (4 - 6)  oxyCODONE    IR 10 milliGRAM(s) Oral every 4 hours PRN Severe Pain (7 - 10)      CAPILLARY BLOOD GLUCOSE        I&O's Summary    23 Sep 2020 07:01  -  24 Sep 2020 07:00  --------------------------------------------------------  IN: 720 mL / OUT: 1020 mL / NET: -300 mL    24 Sep 2020 07:01  -  24 Sep 2020 14:16  --------------------------------------------------------  IN: 240 mL / OUT: 0 mL / NET: 240 mL        PHYSICAL EXAM:  Vital Signs Last 24 Hrs  T(C): 36.8 (24 Sep 2020 08:45), Max: 37.1 (23 Sep 2020 14:40)  T(F): 98.3 (24 Sep 2020 08:45), Max: 98.7 (23 Sep 2020 14:40)  HR: 80 (24 Sep 2020 08:45) (76 - 87)  BP: 104/62 (24 Sep 2020 08:45) (101/59 - 125/79)  BP(mean): --  RR: 18 (24 Sep 2020 08:45) (18 - 18)  SpO2: 99% (24 Sep 2020 08:45) (94% - 99%)    CONSTITUTIONAL: NAD, well-developed, well-groomed, obese  NECK: Supple, no palpable masses; no thyromegaly  RESPIRATORY: Normal respiratory effort; lungs are clear to auscultation bilaterally  CARDIOVASCULAR: normal S1 and S2; No lower extremity edema  ABDOMEN: Nontender to palpation, normoactive bowel sounds, no rebound/guarding; No hepatosplenomegaly  MUSCULOSKELETAL:  no clubbing or cyanosis of digits; no joint swelling or tenderness to palpation, moves all extremities  PSYCH: A+O to person, place, and time; affect appropriate   SKIN: No rashes; no palpable lesions, + drain in place    LABS:                        9.0    6.88  )-----------( 180      ( 24 Sep 2020 06:38 )             28.4     09-24    139  |  103  |  11  ----------------------------<  104<H>  4.3   |  27  |  0.87    Ca    9.0      24 Sep 2020 06:37                  RADIOLOGY & ADDITIONAL TESTS:  Results Reviewed:   Imaging Personally Reviewed:  Electrocardiogram Personally Reviewed:    COORDINATION OF CARE:  Care Discussed with Consultants/Other Providers [Y/N]: neurosurgery PA  Prior or Outpatient Records Reviewed [Y/N]:

## 2020-10-06 ENCOUNTER — APPOINTMENT (OUTPATIENT)
Dept: LAB | Facility: MEDICAL CENTER | Age: 65
End: 2020-10-06
Payer: MEDICARE

## 2020-10-06 DIAGNOSIS — N42.9 DISORDER OF PROSTATE, UNSPECIFIED: ICD-10-CM

## 2020-10-06 DIAGNOSIS — E78.01 FAMILIAL HYPERCHOLESTEROLEMIA: ICD-10-CM

## 2020-10-06 DIAGNOSIS — Z12.5 SCREENING FOR PROSTATE CANCER: ICD-10-CM

## 2020-10-06 LAB
ALBUMIN SERPL BCP-MCNC: 4 G/DL (ref 3.5–5)
ALP SERPL-CCNC: 84 U/L (ref 46–116)
ALT SERPL W P-5'-P-CCNC: 26 U/L (ref 12–78)
ANION GAP SERPL CALCULATED.3IONS-SCNC: 3 MMOL/L (ref 4–13)
AST SERPL W P-5'-P-CCNC: 19 U/L (ref 5–45)
BILIRUB SERPL-MCNC: 0.5 MG/DL (ref 0.2–1)
BUN SERPL-MCNC: 21 MG/DL (ref 5–25)
CALCIUM SERPL-MCNC: 9.5 MG/DL (ref 8.3–10.1)
CHLORIDE SERPL-SCNC: 105 MMOL/L (ref 100–108)
CHOLEST SERPL-MCNC: 181 MG/DL (ref 50–200)
CO2 SERPL-SCNC: 30 MMOL/L (ref 21–32)
CREAT SERPL-MCNC: 0.99 MG/DL (ref 0.6–1.3)
GFR SERPL CREATININE-BSD FRML MDRD: 78 ML/MIN/1.73SQ M
GLUCOSE P FAST SERPL-MCNC: 82 MG/DL (ref 65–99)
HDLC SERPL-MCNC: 73 MG/DL
LDLC SERPL CALC-MCNC: 95 MG/DL (ref 0–100)
POTASSIUM SERPL-SCNC: 3.8 MMOL/L (ref 3.5–5.3)
PROT SERPL-MCNC: 7.1 G/DL (ref 6.4–8.2)
SODIUM SERPL-SCNC: 138 MMOL/L (ref 136–145)
TRIGL SERPL-MCNC: 67 MG/DL

## 2020-10-06 PROCEDURE — 80061 LIPID PANEL: CPT

## 2020-10-06 PROCEDURE — 84153 ASSAY OF PSA TOTAL: CPT

## 2020-10-06 PROCEDURE — 36415 COLL VENOUS BLD VENIPUNCTURE: CPT

## 2020-10-06 PROCEDURE — 80053 COMPREHEN METABOLIC PANEL: CPT

## 2020-10-06 PROCEDURE — 84154 ASSAY OF PSA FREE: CPT

## 2020-10-07 LAB
PSA FREE MFR SERPL: 33.3 %
PSA FREE SERPL-MCNC: 0.2 NG/ML
PSA SERPL-MCNC: 0.6 NG/ML (ref 0–4)

## 2020-10-08 ENCOUNTER — OFFICE VISIT (OUTPATIENT)
Dept: INTERNAL MEDICINE CLINIC | Facility: CLINIC | Age: 65
End: 2020-10-08
Payer: MEDICARE

## 2020-10-08 VITALS
HEART RATE: 80 BPM | WEIGHT: 158.4 LBS | DIASTOLIC BLOOD PRESSURE: 76 MMHG | BODY MASS INDEX: 24.01 KG/M2 | HEIGHT: 68 IN | OXYGEN SATURATION: 98 % | SYSTOLIC BLOOD PRESSURE: 96 MMHG | TEMPERATURE: 98 F

## 2020-10-08 DIAGNOSIS — Z00.00 MEDICARE ANNUAL WELLNESS VISIT, SUBSEQUENT: ICD-10-CM

## 2020-10-08 DIAGNOSIS — Z23 ENCOUNTER FOR IMMUNIZATION: ICD-10-CM

## 2020-10-08 DIAGNOSIS — F41.1 GAD (GENERALIZED ANXIETY DISORDER): ICD-10-CM

## 2020-10-08 DIAGNOSIS — Z12.11 SCREENING FOR COLON CANCER: ICD-10-CM

## 2020-10-08 DIAGNOSIS — E78.01 FAMILIAL HYPERCHOLESTEROLEMIA: Chronic | ICD-10-CM

## 2020-10-08 DIAGNOSIS — G47.33 OSA (OBSTRUCTIVE SLEEP APNEA): ICD-10-CM

## 2020-10-08 DIAGNOSIS — Z91.89 AT RISK FOR LONG QT SYNDROME: ICD-10-CM

## 2020-10-08 DIAGNOSIS — Z91.89 HISTORY OF PNEUMONIA AS INDICATION FOR 23-POLYVALENT PNEUMOCOCCAL POLYSACCHARIDE VACCINE: ICD-10-CM

## 2020-10-08 DIAGNOSIS — Z23 FLU VACCINE NEED: ICD-10-CM

## 2020-10-08 DIAGNOSIS — G31.84 MILD COGNITIVE IMPAIRMENT WITH MEMORY LOSS: Primary | ICD-10-CM

## 2020-10-08 DIAGNOSIS — F33.1 MAJOR DEPRESSIVE DISORDER, RECURRENT, MODERATE (HCC): ICD-10-CM

## 2020-10-08 DIAGNOSIS — Z87.01 HISTORY OF PNEUMONIA AS INDICATION FOR 23-POLYVALENT PNEUMOCOCCAL POLYSACCHARIDE VACCINE: ICD-10-CM

## 2020-10-08 PROCEDURE — 93000 ELECTROCARDIOGRAM COMPLETE: CPT | Performed by: INTERNAL MEDICINE

## 2020-10-08 PROCEDURE — 1123F ACP DISCUSS/DSCN MKR DOCD: CPT | Performed by: INTERNAL MEDICINE

## 2020-10-08 PROCEDURE — 90662 IIV NO PRSV INCREASED AG IM: CPT

## 2020-10-08 PROCEDURE — G0008 ADMIN INFLUENZA VIRUS VAC: HCPCS

## 2020-10-08 PROCEDURE — G0009 ADMIN PNEUMOCOCCAL VACCINE: HCPCS

## 2020-10-08 PROCEDURE — G0439 PPPS, SUBSEQ VISIT: HCPCS | Performed by: INTERNAL MEDICINE

## 2020-10-08 PROCEDURE — 99214 OFFICE O/P EST MOD 30 MIN: CPT | Performed by: INTERNAL MEDICINE

## 2020-10-08 PROCEDURE — 90732 PPSV23 VACC 2 YRS+ SUBQ/IM: CPT

## 2020-10-29 ENCOUNTER — OFFICE VISIT (OUTPATIENT)
Dept: PSYCHIATRY | Facility: CLINIC | Age: 65
End: 2020-10-29
Payer: MEDICARE

## 2020-10-29 DIAGNOSIS — G31.84 MILD COGNITIVE IMPAIRMENT WITH MEMORY LOSS: ICD-10-CM

## 2020-10-29 DIAGNOSIS — F41.1 GAD (GENERALIZED ANXIETY DISORDER): ICD-10-CM

## 2020-10-29 DIAGNOSIS — F33.1 MAJOR DEPRESSIVE DISORDER, RECURRENT, MODERATE (HCC): Primary | ICD-10-CM

## 2020-10-29 PROCEDURE — 99214 OFFICE O/P EST MOD 30 MIN: CPT | Performed by: PHYSICIAN ASSISTANT

## 2020-10-29 RX ORDER — CITALOPRAM 40 MG/1
40 TABLET ORAL DAILY
Qty: 90 TABLET | Refills: 0 | Status: SHIPPED | OUTPATIENT
Start: 2020-10-29 | End: 2020-11-24 | Stop reason: SDUPTHER

## 2020-10-29 RX ORDER — ARIPIPRAZOLE 5 MG/1
5 TABLET ORAL EVERY MORNING
Qty: 30 TABLET | Refills: 1 | Status: SHIPPED | OUTPATIENT
Start: 2020-10-29 | End: 2020-11-24 | Stop reason: SDUPTHER

## 2020-11-02 ENCOUNTER — CONSULT (OUTPATIENT)
Dept: GASTROENTEROLOGY | Facility: CLINIC | Age: 65
End: 2020-11-02
Payer: MEDICARE

## 2020-11-02 VITALS
HEIGHT: 68 IN | BODY MASS INDEX: 24.25 KG/M2 | SYSTOLIC BLOOD PRESSURE: 102 MMHG | TEMPERATURE: 97.3 F | WEIGHT: 160 LBS | DIASTOLIC BLOOD PRESSURE: 60 MMHG

## 2020-11-02 DIAGNOSIS — Z12.11 SCREENING FOR COLON CANCER: ICD-10-CM

## 2020-11-02 DIAGNOSIS — Z86.010 HISTORY OF COLON POLYPS: Primary | ICD-10-CM

## 2020-11-02 DIAGNOSIS — Z86.010 HISTORY OF COLON POLYPS: ICD-10-CM

## 2020-11-02 PROCEDURE — 99203 OFFICE O/P NEW LOW 30 MIN: CPT | Performed by: INTERNAL MEDICINE

## 2020-11-02 RX ORDER — POLYETHYLENE GLYCOL 3350 17 G/17G
17 POWDER, FOR SOLUTION ORAL DAILY
Qty: 510 G | Refills: 5 | Status: SHIPPED | OUTPATIENT
Start: 2020-11-02 | End: 2020-11-03

## 2020-11-03 RX ORDER — POLYETHYLENE GLYCOL 3350 17 G/17G
17 POWDER, FOR SOLUTION ORAL DAILY
Qty: 510 G | Refills: 5 | Status: SHIPPED | OUTPATIENT
Start: 2020-11-03 | End: 2021-03-24

## 2020-11-09 ENCOUNTER — ANESTHESIA EVENT (OUTPATIENT)
Dept: GASTROENTEROLOGY | Facility: MEDICAL CENTER | Age: 65
End: 2020-11-09

## 2020-11-12 DIAGNOSIS — J30.2 SEASONAL ALLERGIES: ICD-10-CM

## 2020-11-12 RX ORDER — FLUTICASONE PROPIONATE 50 MCG
SPRAY, SUSPENSION (ML) NASAL
Qty: 48 ML | Refills: 1 | Status: SHIPPED | OUTPATIENT
Start: 2020-11-12

## 2020-11-19 ENCOUNTER — TELEMEDICINE (OUTPATIENT)
Dept: INTERNAL MEDICINE CLINIC | Facility: CLINIC | Age: 65
End: 2020-11-19
Payer: MEDICARE

## 2020-11-19 DIAGNOSIS — R05.9 COUGH: Primary | ICD-10-CM

## 2020-11-19 DIAGNOSIS — J34.89 RHINORRHEA: ICD-10-CM

## 2020-11-19 DIAGNOSIS — J98.8 CONGESTION OF UPPER AIRWAY: ICD-10-CM

## 2020-11-19 PROCEDURE — 99441 PR PHYS/QHP TELEPHONE EVALUATION 5-10 MIN: CPT | Performed by: INTERNAL MEDICINE

## 2020-11-20 DIAGNOSIS — J98.8 CONGESTION OF UPPER AIRWAY: ICD-10-CM

## 2020-11-20 DIAGNOSIS — R05.9 COUGH: ICD-10-CM

## 2020-11-20 DIAGNOSIS — J34.89 RHINORRHEA: ICD-10-CM

## 2020-11-20 PROCEDURE — U0003 INFECTIOUS AGENT DETECTION BY NUCLEIC ACID (DNA OR RNA); SEVERE ACUTE RESPIRATORY SYNDROME CORONAVIRUS 2 (SARS-COV-2) (CORONAVIRUS DISEASE [COVID-19]), AMPLIFIED PROBE TECHNIQUE, MAKING USE OF HIGH THROUGHPUT TECHNOLOGIES AS DESCRIBED BY CMS-2020-01-R: HCPCS | Performed by: INTERNAL MEDICINE

## 2020-11-21 LAB — SARS-COV-2 RNA SPEC QL NAA+PROBE: NOT DETECTED

## 2020-11-23 ENCOUNTER — TELEPHONE (OUTPATIENT)
Dept: INTERNAL MEDICINE CLINIC | Facility: CLINIC | Age: 65
End: 2020-11-23

## 2020-11-24 ENCOUNTER — OFFICE VISIT (OUTPATIENT)
Dept: PSYCHIATRY | Facility: CLINIC | Age: 65
End: 2020-11-24
Payer: MEDICARE

## 2020-11-24 DIAGNOSIS — F33.1 MAJOR DEPRESSIVE DISORDER, RECURRENT, MODERATE (HCC): Primary | ICD-10-CM

## 2020-11-24 DIAGNOSIS — F41.1 GAD (GENERALIZED ANXIETY DISORDER): ICD-10-CM

## 2020-11-24 DIAGNOSIS — F03.90 DEMENTIA WITHOUT BEHAVIORAL DISTURBANCE, UNSPECIFIED DEMENTIA TYPE (HCC): ICD-10-CM

## 2020-11-24 PROCEDURE — 99214 OFFICE O/P EST MOD 30 MIN: CPT | Performed by: PHYSICIAN ASSISTANT

## 2020-11-24 RX ORDER — ARIPIPRAZOLE 5 MG/1
5 TABLET ORAL EVERY MORNING
Qty: 30 TABLET | Refills: 1 | Status: SHIPPED | OUTPATIENT
Start: 2020-11-24 | End: 2020-12-29 | Stop reason: SDUPTHER

## 2020-11-24 RX ORDER — CITALOPRAM 10 MG/1
10 TABLET ORAL DAILY
Qty: 30 TABLET | Refills: 1 | Status: SHIPPED | OUTPATIENT
Start: 2020-11-24 | End: 2020-12-29 | Stop reason: DRUGHIGH

## 2020-11-24 RX ORDER — CITALOPRAM 40 MG/1
40 TABLET ORAL DAILY
Qty: 90 TABLET | Refills: 0 | Status: SHIPPED | OUTPATIENT
Start: 2020-11-24 | End: 2020-12-29 | Stop reason: SDUPTHER

## 2020-12-01 ENCOUNTER — HOSPITAL ENCOUNTER (OUTPATIENT)
Dept: GASTROENTEROLOGY | Facility: MEDICAL CENTER | Age: 65
Setting detail: OUTPATIENT SURGERY
Discharge: HOME | End: 2020-12-01
Attending: INTERNAL MEDICINE | Admitting: INTERNAL MEDICINE
Payer: MEDICARE

## 2020-12-01 ENCOUNTER — ANESTHESIA (OUTPATIENT)
Dept: GASTROENTEROLOGY | Facility: MEDICAL CENTER | Age: 65
End: 2020-12-01

## 2020-12-01 VITALS
HEART RATE: 65 BPM | TEMPERATURE: 98.6 F | SYSTOLIC BLOOD PRESSURE: 115 MMHG | HEIGHT: 68 IN | BODY MASS INDEX: 25.01 KG/M2 | DIASTOLIC BLOOD PRESSURE: 66 MMHG | RESPIRATION RATE: 20 BRPM | WEIGHT: 165 LBS | OXYGEN SATURATION: 100 %

## 2020-12-01 VITALS — HEART RATE: 51 BPM

## 2020-12-01 DIAGNOSIS — Z86.010 HISTORY OF COLON POLYPS: ICD-10-CM

## 2020-12-01 PROCEDURE — 88305 TISSUE EXAM BY PATHOLOGIST: CPT | Performed by: PATHOLOGY

## 2020-12-01 PROCEDURE — 45385 COLONOSCOPY W/LESION REMOVAL: CPT | Performed by: INTERNAL MEDICINE

## 2020-12-01 RX ORDER — SODIUM CHLORIDE 9 MG/ML
125 INJECTION, SOLUTION INTRAVENOUS CONTINUOUS
Status: DISCONTINUED | OUTPATIENT
Start: 2020-12-01 | End: 2020-12-05 | Stop reason: HOSPADM

## 2020-12-01 RX ORDER — PROPOFOL 10 MG/ML
INJECTION, EMULSION INTRAVENOUS AS NEEDED
Status: DISCONTINUED | OUTPATIENT
Start: 2020-12-01 | End: 2020-12-01

## 2020-12-01 RX ADMIN — PROPOFOL 50 MG: 10 INJECTION, EMULSION INTRAVENOUS at 10:23

## 2020-12-01 RX ADMIN — SODIUM CHLORIDE 125 ML/HR: 0.9 INJECTION, SOLUTION INTRAVENOUS at 09:28

## 2020-12-01 RX ADMIN — PROPOFOL 50 MG: 10 INJECTION, EMULSION INTRAVENOUS at 10:17

## 2020-12-01 RX ADMIN — PROPOFOL 50 MG: 10 INJECTION, EMULSION INTRAVENOUS at 10:20

## 2020-12-29 ENCOUNTER — TELEPHONE (OUTPATIENT)
Dept: PSYCHIATRY | Facility: CLINIC | Age: 65
End: 2020-12-29

## 2020-12-29 ENCOUNTER — OFFICE VISIT (OUTPATIENT)
Dept: PSYCHIATRY | Facility: CLINIC | Age: 65
End: 2020-12-29
Payer: MEDICARE

## 2020-12-29 DIAGNOSIS — F41.1 GAD (GENERALIZED ANXIETY DISORDER): Primary | ICD-10-CM

## 2020-12-29 DIAGNOSIS — F33.1 MAJOR DEPRESSIVE DISORDER, RECURRENT, MODERATE (HCC): ICD-10-CM

## 2020-12-29 DIAGNOSIS — F03.90 DEMENTIA WITHOUT BEHAVIORAL DISTURBANCE, UNSPECIFIED DEMENTIA TYPE (HCC): ICD-10-CM

## 2020-12-29 PROCEDURE — 99214 OFFICE O/P EST MOD 30 MIN: CPT | Performed by: PHYSICIAN ASSISTANT

## 2020-12-29 RX ORDER — ARIPIPRAZOLE 5 MG/1
5 TABLET ORAL EVERY MORNING
Qty: 90 TABLET | Refills: 0 | Status: CANCELLED | OUTPATIENT
Start: 2020-12-29

## 2020-12-29 RX ORDER — ARIPIPRAZOLE 5 MG/1
5 TABLET ORAL EVERY MORNING
Qty: 90 TABLET | Refills: 0 | Status: SHIPPED | OUTPATIENT
Start: 2020-12-29 | End: 2020-12-29 | Stop reason: SDUPTHER

## 2020-12-29 RX ORDER — CITALOPRAM 40 MG/1
40 TABLET ORAL DAILY
COMMUNITY
Start: 2020-12-29 | End: 2021-04-01 | Stop reason: SDUPTHER

## 2020-12-29 RX ORDER — ARIPIPRAZOLE 5 MG/1
5 TABLET ORAL EVERY MORNING
Qty: 90 TABLET | Refills: 0 | Status: SHIPPED | OUTPATIENT
Start: 2020-12-29 | End: 2021-01-28

## 2021-01-01 ENCOUNTER — OFFICE VISIT (OUTPATIENT)
Dept: OCCUPATIONAL THERAPY | Facility: CLINIC | Age: 66
End: 2021-01-01
Payer: MEDICARE

## 2021-01-01 ENCOUNTER — OFFICE VISIT (OUTPATIENT)
Dept: URGENT CARE | Age: 66
End: 2021-01-01
Payer: MEDICARE

## 2021-01-01 ENCOUNTER — APPOINTMENT (OUTPATIENT)
Dept: OCCUPATIONAL THERAPY | Facility: CLINIC | Age: 66
End: 2021-01-01
Payer: MEDICARE

## 2021-01-01 ENCOUNTER — TELEPHONE (OUTPATIENT)
Dept: GERIATRICS | Age: 66
End: 2021-01-01

## 2021-01-01 ENCOUNTER — IMMUNIZATIONS (OUTPATIENT)
Dept: FAMILY MEDICINE CLINIC | Facility: HOSPITAL | Age: 66
End: 2021-01-01

## 2021-01-01 ENCOUNTER — PATIENT MESSAGE (OUTPATIENT)
Dept: GERIATRICS | Age: 66
End: 2021-01-01

## 2021-01-01 ENCOUNTER — OFFICE VISIT (OUTPATIENT)
Dept: GERIATRICS | Age: 66
End: 2021-01-01
Payer: MEDICARE

## 2021-01-01 ENCOUNTER — EVALUATION (OUTPATIENT)
Dept: OCCUPATIONAL THERAPY | Facility: CLINIC | Age: 66
End: 2021-01-01
Payer: MEDICARE

## 2021-01-01 ENCOUNTER — EVALUATION (OUTPATIENT)
Dept: PHYSICAL THERAPY | Facility: CLINIC | Age: 66
End: 2021-01-01
Payer: MEDICARE

## 2021-01-01 ENCOUNTER — OFFICE VISIT (OUTPATIENT)
Dept: URGENT CARE | Facility: CLINIC | Age: 66
End: 2021-01-01
Payer: MEDICARE

## 2021-01-01 ENCOUNTER — APPOINTMENT (OUTPATIENT)
Dept: PHYSICAL THERAPY | Facility: CLINIC | Age: 66
End: 2021-01-01
Payer: MEDICARE

## 2021-01-01 ENCOUNTER — CONSULT (OUTPATIENT)
Dept: GERIATRICS | Age: 66
End: 2021-01-01
Payer: MEDICARE

## 2021-01-01 ENCOUNTER — TELEPHONE (OUTPATIENT)
Dept: GERIATRICS | Facility: OTHER | Age: 66
End: 2021-01-01

## 2021-01-01 VITALS
RESPIRATION RATE: 16 BRPM | HEART RATE: 74 BPM | TEMPERATURE: 98.2 F | DIASTOLIC BLOOD PRESSURE: 62 MMHG | SYSTOLIC BLOOD PRESSURE: 96 MMHG | OXYGEN SATURATION: 95 %

## 2021-01-01 VITALS — HEART RATE: 77 BPM | OXYGEN SATURATION: 94 % | RESPIRATION RATE: 18 BRPM | TEMPERATURE: 97.3 F

## 2021-01-01 VITALS
HEIGHT: 69 IN | HEART RATE: 89 BPM | TEMPERATURE: 97.7 F | BODY MASS INDEX: 23.73 KG/M2 | SYSTOLIC BLOOD PRESSURE: 96 MMHG | OXYGEN SATURATION: 95 % | DIASTOLIC BLOOD PRESSURE: 52 MMHG | RESPIRATION RATE: 18 BRPM | WEIGHT: 160.2 LBS

## 2021-01-01 VITALS
BODY MASS INDEX: 25.33 KG/M2 | OXYGEN SATURATION: 95 % | SYSTOLIC BLOOD PRESSURE: 100 MMHG | RESPIRATION RATE: 18 BRPM | WEIGHT: 171 LBS | HEIGHT: 69 IN | TEMPERATURE: 98 F | DIASTOLIC BLOOD PRESSURE: 58 MMHG | HEART RATE: 69 BPM

## 2021-01-01 VITALS
DIASTOLIC BLOOD PRESSURE: 56 MMHG | TEMPERATURE: 97.4 F | SYSTOLIC BLOOD PRESSURE: 106 MMHG | BODY MASS INDEX: 24.62 KG/M2 | HEART RATE: 68 BPM | WEIGHT: 166.2 LBS | HEIGHT: 69 IN | OXYGEN SATURATION: 95 % | RESPIRATION RATE: 18 BRPM

## 2021-01-01 DIAGNOSIS — G30.0 EARLY ONSET ALZHEIMER'S DEMENTIA WITHOUT BEHAVIORAL DISTURBANCE (HCC): Primary | ICD-10-CM

## 2021-01-01 DIAGNOSIS — F41.9 ANXIETY: ICD-10-CM

## 2021-01-01 DIAGNOSIS — F03.91 DEMENTIA WITH BEHAVIORAL DISTURBANCE, UNSPECIFIED DEMENTIA TYPE (HCC): Primary | ICD-10-CM

## 2021-01-01 DIAGNOSIS — G30.0 EARLY ONSET ALZHEIMER'S DISEASE WITH BEHAVIORAL DISTURBANCE (HCC): Primary | ICD-10-CM

## 2021-01-01 DIAGNOSIS — Z11.9 SCREENING EXAMINATION FOR UNSPECIFIED INFECTIOUS DISEASE: Primary | ICD-10-CM

## 2021-01-01 DIAGNOSIS — F02.81 EARLY ONSET ALZHEIMER'S DISEASE WITH BEHAVIORAL DISTURBANCE (HCC): Chronic | ICD-10-CM

## 2021-01-01 DIAGNOSIS — R53.81 PHYSICAL DECONDITIONING: ICD-10-CM

## 2021-01-01 DIAGNOSIS — G89.29 CHRONIC BILATERAL LOW BACK PAIN WITHOUT SCIATICA: ICD-10-CM

## 2021-01-01 DIAGNOSIS — Z20.822 CLOSE EXPOSURE TO COVID-19 VIRUS: Primary | ICD-10-CM

## 2021-01-01 DIAGNOSIS — F02.81 EARLY ONSET ALZHEIMER'S DISEASE WITH BEHAVIORAL DISTURBANCE (HCC): Primary | ICD-10-CM

## 2021-01-01 DIAGNOSIS — Z23 INFLUENZA VACCINE ADMINISTERED: ICD-10-CM

## 2021-01-01 DIAGNOSIS — Z23 NEED FOR IMMUNIZATION AGAINST INFLUENZA: ICD-10-CM

## 2021-01-01 DIAGNOSIS — F02.80 EARLY ONSET ALZHEIMER'S DEMENTIA WITHOUT BEHAVIORAL DISTURBANCE (HCC): Primary | ICD-10-CM

## 2021-01-01 DIAGNOSIS — M79.604 LOW BACK PAIN RADIATING TO RIGHT LEG: Primary | ICD-10-CM

## 2021-01-01 DIAGNOSIS — E78.2 MIXED HYPERLIPIDEMIA: Chronic | ICD-10-CM

## 2021-01-01 DIAGNOSIS — Z23 ENCOUNTER FOR IMMUNIZATION: Primary | ICD-10-CM

## 2021-01-01 DIAGNOSIS — R50.9 FEVER, UNSPECIFIED FEVER CAUSE: ICD-10-CM

## 2021-01-01 DIAGNOSIS — K59.01 SLOW TRANSIT CONSTIPATION: ICD-10-CM

## 2021-01-01 DIAGNOSIS — Z91.89 DRIVING SAFETY ISSUE: Chronic | ICD-10-CM

## 2021-01-01 DIAGNOSIS — F41.9 ANXIETY: Chronic | ICD-10-CM

## 2021-01-01 DIAGNOSIS — G30.0 EARLY ONSET ALZHEIMER'S DISEASE WITH BEHAVIORAL DISTURBANCE (HCC): Chronic | ICD-10-CM

## 2021-01-01 DIAGNOSIS — H90.2 CONDUCTIVE HEARING LOSS, UNSPECIFIED LATERALITY: ICD-10-CM

## 2021-01-01 DIAGNOSIS — F02.81 EARLY ONSET ALZHEIMER'S DISEASE WITH BEHAVIORAL DISTURBANCE (HCC): Primary | Chronic | ICD-10-CM

## 2021-01-01 DIAGNOSIS — G47.33 OBSTRUCTIVE SLEEP APNEA SYNDROME: Chronic | ICD-10-CM

## 2021-01-01 DIAGNOSIS — G30.0 EARLY ONSET ALZHEIMER'S DISEASE WITH BEHAVIORAL DISTURBANCE (HCC): Primary | Chronic | ICD-10-CM

## 2021-01-01 DIAGNOSIS — J06.9 ACUTE URI: Primary | ICD-10-CM

## 2021-01-01 DIAGNOSIS — M54.50 LOW BACK PAIN RADIATING TO RIGHT LEG: Primary | ICD-10-CM

## 2021-01-01 DIAGNOSIS — R05.9 COUGH: Primary | ICD-10-CM

## 2021-01-01 DIAGNOSIS — M54.50 CHRONIC BILATERAL LOW BACK PAIN WITHOUT SCIATICA: ICD-10-CM

## 2021-01-01 LAB
FLUAV RNA RESP QL NAA+PROBE: NEGATIVE
FLUBV RNA RESP QL NAA+PROBE: NEGATIVE
RSV RNA RESP QL NAA+PROBE: NEGATIVE
SARS-COV-2 RNA RESP QL NAA+PROBE: NEGATIVE

## 2021-01-01 PROCEDURE — G0463 HOSPITAL OUTPT CLINIC VISIT: HCPCS | Performed by: NURSE PRACTITIONER

## 2021-01-01 PROCEDURE — G0008 ADMIN INFLUENZA VIRUS VAC: HCPCS | Performed by: STUDENT IN AN ORGANIZED HEALTH CARE EDUCATION/TRAINING PROGRAM

## 2021-01-01 PROCEDURE — G0463 HOSPITAL OUTPT CLINIC VISIT: HCPCS | Performed by: PHYSICIAN ASSISTANT

## 2021-01-01 PROCEDURE — 97110 THERAPEUTIC EXERCISES: CPT

## 2021-01-01 PROCEDURE — 99213 OFFICE O/P EST LOW 20 MIN: CPT | Performed by: STUDENT IN AN ORGANIZED HEALTH CARE EDUCATION/TRAINING PROGRAM

## 2021-01-01 PROCEDURE — 97530 THERAPEUTIC ACTIVITIES: CPT

## 2021-01-01 PROCEDURE — 99215 OFFICE O/P EST HI 40 MIN: CPT | Performed by: STUDENT IN AN ORGANIZED HEALTH CARE EDUCATION/TRAINING PROGRAM

## 2021-01-01 PROCEDURE — 91306 COVID-19 MODERNA VACC 0.25 ML BOOSTER: CPT

## 2021-01-01 PROCEDURE — 0241U HB NFCT DS VIR RESP RNA 4 TRGT: CPT | Performed by: PHYSICIAN ASSISTANT

## 2021-01-01 PROCEDURE — 0013A COVID-19 MODERNA VACC 0.25 ML BOOSTER: CPT

## 2021-01-01 PROCEDURE — 97166 OT EVAL MOD COMPLEX 45 MIN: CPT

## 2021-01-01 PROCEDURE — 99213 OFFICE O/P EST LOW 20 MIN: CPT | Performed by: NURSE PRACTITIONER

## 2021-01-01 PROCEDURE — 97162 PT EVAL MOD COMPLEX 30 MIN: CPT

## 2021-01-01 PROCEDURE — U0003 INFECTIOUS AGENT DETECTION BY NUCLEIC ACID (DNA OR RNA); SEVERE ACUTE RESPIRATORY SYNDROME CORONAVIRUS 2 (SARS-COV-2) (CORONAVIRUS DISEASE [COVID-19]), AMPLIFIED PROBE TECHNIQUE, MAKING USE OF HIGH THROUGHPUT TECHNOLOGIES AS DESCRIBED BY CMS-2020-01-R: HCPCS | Performed by: INTERNAL MEDICINE

## 2021-01-01 PROCEDURE — U0005 INFEC AGEN DETEC AMPLI PROBE: HCPCS | Performed by: INTERNAL MEDICINE

## 2021-01-01 PROCEDURE — 99214 OFFICE O/P EST MOD 30 MIN: CPT | Performed by: INTERNAL MEDICINE

## 2021-01-01 PROCEDURE — 99213 OFFICE O/P EST LOW 20 MIN: CPT | Performed by: PHYSICIAN ASSISTANT

## 2021-01-01 PROCEDURE — 90662 IIV NO PRSV INCREASED AG IM: CPT | Performed by: STUDENT IN AN ORGANIZED HEALTH CARE EDUCATION/TRAINING PROGRAM

## 2021-01-01 RX ORDER — OLANZAPINE 2.5 MG/1
2.5 TABLET ORAL
Qty: 14 TABLET | Refills: 0 | Status: SHIPPED | OUTPATIENT
Start: 2021-01-01 | End: 2021-01-01 | Stop reason: SDUPTHER

## 2021-01-01 RX ORDER — QUETIAPINE FUMARATE 25 MG/1
12.5 TABLET, FILM COATED ORAL
Qty: 45 TABLET | Refills: 1 | Status: SHIPPED | OUTPATIENT
Start: 2021-01-01 | End: 2021-01-01

## 2021-01-01 RX ORDER — OLANZAPINE 2.5 MG/1
2.5 TABLET ORAL
Qty: 30 TABLET | Refills: 0 | Status: SHIPPED | OUTPATIENT
Start: 2021-01-01 | End: 2021-01-01 | Stop reason: SDUPTHER

## 2021-01-01 RX ORDER — DONEPEZIL HYDROCHLORIDE 10 MG/1
10 TABLET, FILM COATED ORAL 2 TIMES DAILY
Qty: 180 TABLET | Refills: 3 | Status: SHIPPED | OUTPATIENT
Start: 2021-01-01 | End: 2021-01-01 | Stop reason: SDUPTHER

## 2021-01-01 RX ORDER — GUAIFENESIN/DEXTROMETHORPHAN 100-10MG/5
5 SYRUP ORAL 3 TIMES DAILY PRN
Qty: 236 ML | Refills: 0 | Status: SHIPPED | OUTPATIENT
Start: 2021-01-01 | End: 2022-01-01 | Stop reason: ALTCHOICE

## 2021-01-01 RX ORDER — LORAZEPAM 0.5 MG/1
0.5 TABLET ORAL EVERY 6 HOURS PRN
Qty: 120 TABLET | Refills: 3 | Status: SHIPPED | OUTPATIENT
Start: 2021-01-01 | End: 2021-01-01 | Stop reason: SINTOL

## 2021-01-01 RX ORDER — IBUPROFEN 200 MG
400 TABLET ORAL 3 TIMES DAILY PRN
COMMUNITY
End: 2022-01-01 | Stop reason: ALTCHOICE

## 2021-01-06 ENCOUNTER — TELEPHONE (OUTPATIENT)
Dept: GERIATRICS | Age: 66
End: 2021-01-06

## 2021-01-06 NOTE — TELEPHONE ENCOUNTER
[address]  [phone]  Telephone Intake PCP    Referral Source: Self/Daughter       Caller (and relationship to patient): Beth Dinh (wife)     (relationship to patient): Beth Dinh (wife)   Phone Number: [phone]   Is caller POA? no    Reason for choosing Geriatric PCP: Aging Adult    What is the goal of visit?

## 2021-01-06 NOTE — LETTER
January 6, 2021    Dear Zoraida Gibson,    Thank you for choosing [facility] as your new primary care provider (PCP)  We specialize in the needs of older adults  We assist aging adults in maintaining a healthy and independent lifestyle  Please bring the following to your initial appointment:     Please wear a mask without a valve (everyone entering the building)   Eye glasses and/or hearing aids (as applicable)   Enclosed forms (please complete prior to appointment)   Living will, advance directives and/or durable power of  documents (if you have them)   Medication list including vitamins or supplements you are currently taking  Your appointment is scheduled for Thursday, January 28, 2021 at 10:00 am  Please arrive by 9:45 am    Please arrive at least 15 minutes before scheduled appointment time  When you park, please call our office at [phone] to let us know you have arrived  We will conduct check-in by telephone   To maintain healthy and safety precautions, please follow the below process:    o When you arrive in the parking lot, please call the office at [phone] to let us know you have arrived  We will conduct check-in over the telephone while you are in the vehicle   o One person may accompany patient at the appointment  o All persons entering the building must wear a mask without a valve  If you don't have one, please inform us at [address] and we will give you one  Thank you again for choosing [facility]  If you need to reach us, you can call [phone]  We look forward to meeting you!

## 2021-01-28 ENCOUNTER — OFFICE VISIT (OUTPATIENT)
Dept: GERIATRICS | Age: 66
End: 2021-01-28
Payer: MEDICARE

## 2021-01-28 VITALS
HEIGHT: 68 IN | OXYGEN SATURATION: 98 % | SYSTOLIC BLOOD PRESSURE: 88 MMHG | DIASTOLIC BLOOD PRESSURE: 50 MMHG | WEIGHT: 166 LBS | BODY MASS INDEX: 25.16 KG/M2 | RESPIRATION RATE: 20 BRPM | HEART RATE: 71 BPM | TEMPERATURE: 96.3 F

## 2021-01-28 DIAGNOSIS — F02.80 EARLY ONSET ALZHEIMER'S DEMENTIA WITHOUT BEHAVIORAL DISTURBANCE (HCC): Primary | Chronic | ICD-10-CM

## 2021-01-28 DIAGNOSIS — G30.0 EARLY ONSET ALZHEIMER'S DEMENTIA WITHOUT BEHAVIORAL DISTURBANCE (HCC): Primary | Chronic | ICD-10-CM

## 2021-01-28 DIAGNOSIS — G47.33 OBSTRUCTIVE SLEEP APNEA SYNDROME: Chronic | ICD-10-CM

## 2021-01-28 DIAGNOSIS — F41.9 ANXIETY: Chronic | ICD-10-CM

## 2021-01-28 DIAGNOSIS — E78.01 FAMILIAL HYPERCHOLESTEROLEMIA: Chronic | ICD-10-CM

## 2021-01-28 DIAGNOSIS — Z91.89 DRIVING SAFETY ISSUE: ICD-10-CM

## 2021-01-28 PROBLEM — F41.1 GAD (GENERALIZED ANXIETY DISORDER): Status: RESOLVED | Noted: 2020-07-14 | Resolved: 2021-01-28

## 2021-01-28 PROBLEM — F33.1 MAJOR DEPRESSIVE DISORDER, RECURRENT, MODERATE (HCC): Status: RESOLVED | Noted: 2019-02-19 | Resolved: 2021-01-28

## 2021-01-28 PROBLEM — R00.2 PALPITATION: Status: RESOLVED | Noted: 2017-09-12 | Resolved: 2021-01-28

## 2021-01-28 PROBLEM — R41.3 MEMORY LOSS: Status: RESOLVED | Noted: 2017-04-05 | Resolved: 2021-01-28

## 2021-01-28 PROBLEM — E55.9 VITAMIN D DEFICIENCY: Status: RESOLVED | Noted: 2017-09-12 | Resolved: 2021-01-28

## 2021-01-28 PROCEDURE — 99205 OFFICE O/P NEW HI 60 MIN: CPT | Performed by: INTERNAL MEDICINE

## 2021-01-28 NOTE — ASSESSMENT & PLAN NOTE
With negative stress test and without history of or signs on imaging for atherosclerotic disease, benefits of primary prevention is less clear  Will continue pravastatin for now and stop ASA therapy

## 2021-01-28 NOTE — ASSESSMENT & PLAN NOTE
Diagnosis of EVELIA likely is anxiety related to dementia process  At this point anxiety has improved  Will continue citalopram, stop Abilify and monitor symptoms of cognition, mood, sleep

## 2021-01-28 NOTE — ASSESSMENT & PLAN NOTE
Considering advancing symptoms of Alzheimer's Dementia, highly concerning for continued safety with driving  Refer to OT driving eval and will fax letter of concern to [place] [employer]

## 2021-01-28 NOTE — PROGRESS NOTES
AGUSTINW met briefly with Aoms Mendoza and his wife, Radha Garcia, during today's appointment to introduce role in [address] [place] [address] office, encourage reaching out as needed, and provide information on monthly virtual caregiver support group  Contact information provided

## 2021-01-28 NOTE — PROGRESS NOTES
Assessment/Plan:    1  Early onset Alzheimer's dementia without behavioral disturbance (HCC)  Assessment & Plan:  Symptoms started likely prior to age 70, with typical progression for Alzheimer's type dementia with short term memory loss, decreased executive functioning and loss of verbal functioning without other secondary features such as neuromuscular findings, significant behavior or personality changes  Orders:  -     Ambulatory referral to Occupational Therapy; Future    2  Obstructive sleep apnea syndrome  Assessment & Plan: Will continue CPAP therapy  May consider discontinuing if it becomes too difficult to use  3  Familial hypercholesterolemia  Assessment & Plan: With negative stress test and without history of or signs on imaging for atherosclerotic disease, benefits of primary prevention is less clear  Will continue pravastatin for now and stop ASA therapy  4  Anxiety  Assessment & Plan:  Diagnosis of EVELIA likely is anxiety related to dementia process  At this point anxiety has improved  Will continue citalopram, stop Abilify and monitor symptoms of cognition, mood, sleep  5  Driving safety issue  Assessment & Plan:  Considering advancing symptoms of Alzheimer's Dementia, highly concerning for continued safety with driving  Refer to OT driving eval and will fax letter of concern to SANJU MACK  Subjective:      Patient ID: Kim Post is a 77 y o  male  HPI  Dementia-patient has followed with Neurology -currently taking donepezil 10 mg patient's wife reports that he had some anxiety towards the onset of his working prior to [employer] and that he has had a continued progressive decline in his cognitive functioning -now with less interest in doing activities or socializing  No longer able to do construction / remodeling or do crossword puzzles  She reports he continues to be independent in basic activities of daily living including bathing dressing toileting    He does not involve himself in finances although wife used to always take care of the finances as well as do the cooking  He does  occasionally still use an TOSIN machine  As well he still does drive although only with his wife present  As well he continues with daily turmeric, vitamin B12 therapies  As per neurology notes patient had MoCA 16/30 with 0/5 recall in Oct 2018  Obstructive sleep apnea- continues use CPAP although sometimes with difficulty  Hyperlipidemia -continues with aspirin and statin therapies  Wife denies any history of atherosclerotic disease such as stroke or heart attack  Records reviewed showed negative stress test a few years ago    Anxiety- has continue with aripiprazole, citalopram every day along with lorazepam 0 only in situations of severe anxiety  Constipation-continues with daily MiraLax and fiber capsule      Review of Systems   Unable to perform ROS: Dementia         Allergies   Allergen Reactions    Fish Oil + D3 [Fish Oil-Cholecalciferol] Rash    Oseltamivir Rash     Past Medical History:   Diagnosis Date    Anxiety     BPH with obstruction/lower urinary tract symptoms     LAST ASSESSED: 9/15/14    Colon polyp     Depression     Hx of laceration of skin     LAST ASSESSED: 11/3/14    Hyperlipidemia     Memory loss     Pneumonia     LAST ASSESSED: 5/12/14    Sleep apnea     Tachycardia     LAST ASSESSED: 3/8/14    Thrombophlebitis of superficial veins of upper extremities     LAST ASSESSED: 9/6/81    Uncomplicated alcohol abuse     LAST ASSESSED: 2/28/15    Weakness of both arms     LAST ASSESSED: 5/12/14    Weakness of both legs     LAST ASSESSED: 5/12/14     Past Surgical History:   Procedure Laterality Date    COLONOSCOPY      COMPLETE    HERNIA REPAIR      SKIN LESION EXCISION      chest melanoma     Family History   Problem Relation Age of Onset    Arthritis Mother     Hyperlipidemia Mother     Dementia Mother         started late 74s    Tinnitus Mother     Arthritis Family     Hyperlipidemia Family     Anxiety disorder Sister      Social History     Socioeconomic History    Marital status: /Civil Union     Spouse name: None    Number of children: 3    Years of education: 12+    Highest education level: None   Occupational History    Occupation: retired   Social Needs    Financial resource strain: Somewhat hard   [place] insecurity     Worry: None     Inability: None    Transportation needs     Medical: None     Non-medical: None   Tobacco Use    Smoking status: Former Smoker     Types: Cigarettes    Smokeless tobacco: Never Used    Tobacco comment: quit 20 years ago   Substance and Sexual Activity    Alcohol use: Yes     Frequency: Monthly or less     Drinks per session: 1 or 2     Binge frequency: Never    Drug use: No    Sexual activity: None   Lifestyle    Physical activity     Days per week: None     Minutes per session: None    Stress: None   Relationships    Social connections     Talks on phone: None     Gets together: None     Attends Sikh service: None     Active member of club or organization: None     Attends meetings of clubs or organizations: None     Relationship status: None    Intimate partner violence     Fear of current or ex partner: None     Emotionally abused: None     Physically abused: None     Forced sexual activity: None   Other Topics Concern    None   Social History Narrative    None         Objective:    BP (!) 88/50 (BP Location: Left arm, Patient Position: Sitting, Cuff Size: Standard)   Pulse 71   Temp (!) 96 3 °F (35 7 °C) (Temporal)   Resp 20   Ht 5' 8" (1 727 m)   Wt 75 3 kg (166 lb)   SpO2 98%   BMI 25 24 kg/m²      Physical Exam  Constitutional:       General: He is not in acute distress  Appearance: He is well-developed  He is not diaphoretic  Eyes:      General: No scleral icterus  Right eye: No discharge  Left eye: No discharge  Conjunctiva/sclera: Conjunctivae normal    Cardiovascular:      Rate and Rhythm: Normal rate and regular rhythm  Heart sounds: Normal heart sounds  Pulmonary:      Effort: Pulmonary effort is normal  No respiratory distress  Breath sounds: Normal breath sounds  Abdominal:      General: Bowel sounds are normal  There is no distension  Palpations: Abdomen is soft  Musculoskeletal:         General: No tenderness or deformity  Skin:     General: Skin is warm and dry  Neurological:      General: No focal deficit present  Mental Status: He is alert  Cranial Nerves: No cranial nerve deficit  Motor: No weakness  Gait: Gait normal       Comments: No rigidity  Speaks in short sentences only  Psychiatric:      Comments: Poor insight  Apathetic appearing           Lab Results   Component Value Date    WBC 4 49 06/17/2019    HGB 14 3 06/17/2019    HCT 43 8 06/17/2019    MCV 91 06/17/2019     06/17/2019     Lab Results   Component Value Date    SODIUM 138 10/06/2020    K 3 8 10/06/2020     10/06/2020    CO2 30 10/06/2020    BUN 21 10/06/2020    CREATININE 0 99 10/06/2020    GLUC 69 06/11/2020    CALCIUM 9 5 10/06/2020     Lab Results   Component Value Date    HGBA1C 5 2 06/17/2019     Lab Results   Component Value Date    IYL5SHMUIJCV 1 760 06/11/2020     Lab Results   Component Value Date    KUHJTPWK39 1,996 (H) 06/11/2020     Lab Results   Component Value Date    PSA 0 6 10/06/2020    PSA 0 6 11/01/2019    PSA 0 5 06/17/2019     Lab Results   Component Value Date    CHOLESTEROL 181 10/06/2020    CHOLESTEROL 187 06/11/2020    CHOLESTEROL 189 11/01/2019     Lab Results   Component Value Date    HDL 73 10/06/2020    HDL 74 06/11/2020    HDL 66 11/01/2019     Lab Results   Component Value Date    TRIG 67 10/06/2020    TRIG 61 06/11/2020    TRIG 58 11/01/2019     No results found for: [place]    No results found for this or any previous visit    Results for orders placed during the hospital encounter of 05/27/20   MRI brain NeuroQuant wo and w contrast    Narrative MRI BRAIN WITH AND WITHOUT CONTRAST, NeuroQuant IMAGING    INDICATION: F03 90: Unspecified dementia without behavioral disturbance  COMPARISON:   Brain MRI 5/9/2017    TECHNIQUE:  Sagittal T1, axial T2, axial Collins, axial T1, axial FLAIR, axial diffusion imaging  Post contrast axial T1, sagittal T1 and Bravo postcontrast   Sagittal 3D volumetric imaging processed by [address] creating General Morphology and Age Related Atrophy reports  IV CONTRAST:  6 mL of gadobutrol injection (MULTI-DOSE)    IMAGE QUALITY:  Diagnostic  FINDINGS:    BRAIN PARENCHYMA:  There is no discrete mass, mass effect or midline shift  Brainstem and cerebellum demonstrate normal signal  There is no intracranial hemorrhage  There is no evidence of acute infarction and diffusion imaging is unremarkable  Post   contrast imaging is normal  Nonspecific foci of T2/FLAIR hyperintensities involving periventricular and subcortical white matter most consistent with mild microangiopathic change, slightly progressed from prior exam           QUANTITATIVE:     Exam Quality: Adequate for volumetric analysis      Hippocampus    Total hippocampal volume (cc):                           6 85    Percentile for similar age:                              38th     Superior Lateral ventricular volume (cc):     41 26    Percentile for similar age:                             69th     Inferior lateral ventricular volume (cc):               3 85    Percentile for similar age:                                    98th     Hippocampal Occupancy Score (HOC):                     0 64        Quantitative conclusions:        Hippocampal Volume:                       Normal volume        Superior Lateral Ventricle Volume:     Normal Volume       Inferior Lateral Ventricle Volume:       Normal Volume    Concordance between qualitative and quantitative hippocampal volume assessment: Concordant  Change in brain volumes: No previous volumetric study for comparison    Mean hippocampal volume loss among normal elderly: 0 7% per year, (-0 3 to 1 7;  Godwin 2008; also Ifeanyi 2010)  VENTRICLES:  The ventricles are normal in size and contour  SELLA AND PITUITARY GLAND:  Normal     ORBITS:  Normal     PARANASAL SINUSES:  Normal     VASCULATURE:  Evaluation of the major intracranial vasculature demonstrates appropriate flow voids  CALVARIUM AND SKULL BASE:  Normal     EXTRACRANIAL SOFT TISSUES:  Normal           Impression 1  Chronic white matter microangiopathy    2  NeuroQuant analysis was performed: Hippocampal volume is within normal range for patient's age (within one standard deviation below the mean); however, there is enlarged temporal horns more than expected for patient's age and additional finding of an   abnormal hippocampal occupancy score  Overall findings are nonspecific  May consider repeat imaging in 6 months to evaluate for interval change in hippocampal volume  Workstation performed: [id number]       I have spent 75 minutes with Patient and family today in which greater than 50% of this time was spent in counseling/coordination of care, review of records regarding Diagnostic results, Prognosis, Risks and benefits of tx options, Intructions for management, Patient and family education, Risk factor reductions and Impressions

## 2021-01-28 NOTE — ASSESSMENT & PLAN NOTE
Symptoms started likely prior to age 70, with typical progression for Alzheimer's type dementia with short term memory loss, decreased executive functioning and loss of verbal functioning without other secondary features such as neuromuscular findings, significant behavior or personality changes

## 2021-02-17 ENCOUNTER — TELEPHONE (OUTPATIENT)
Dept: GERIATRICS | Age: 66
End: 2021-02-17

## 2021-02-17 NOTE — TELEPHONE ENCOUNTER
----- Message from Alex Lopez sent at 2/11/2021  4:47 PM EST -----  Regarding: FW: Non-Urgent Medical Question  Contact: [phone]    ----- Message -----  From: Abdi Avila  Sent: 2/11/2021  10:32 AM EST  To: [address] [facility]  Subject: Non-Urgent Medical Question                      This message is being sent by Jaylon Fowler on behalf of Ozzy Perera seems to be having more bad days than good lately  His legs are shaking again  I am wondering if it is from taking him off the aripiprazole? Would it be ok to start him back on it?                 Thank you,                Arian Espinal

## 2021-02-17 NOTE — TELEPHONE ENCOUNTER
I called and reviewed symptoms  Shaking came and went in a day and has not seemed to return  Not likely related to aripiprazole  Will continue off of aripiprazole

## 2021-02-26 ENCOUNTER — EVALUATION (OUTPATIENT)
Dept: OCCUPATIONAL THERAPY | Facility: CLINIC | Age: 66
End: 2021-02-26
Payer: MEDICARE

## 2021-02-26 DIAGNOSIS — F02.80 EARLY ONSET ALZHEIMER'S DEMENTIA WITHOUT BEHAVIORAL DISTURBANCE (HCC): Primary | Chronic | ICD-10-CM

## 2021-02-26 DIAGNOSIS — G30.0 EARLY ONSET ALZHEIMER'S DEMENTIA WITHOUT BEHAVIORAL DISTURBANCE (HCC): Primary | Chronic | ICD-10-CM

## 2021-02-26 PROCEDURE — 97530 THERAPEUTIC ACTIVITIES: CPT

## 2021-02-26 PROCEDURE — 97167 OT EVAL HIGH COMPLEX 60 MIN: CPT

## 2021-02-26 NOTE — PROGRESS NOTES
OCCUPATIONAL THERAPY INITIAL EVALUATION:    2021  Santana Baxterer  1955  [id number]  Vel Vargas MD  1  Early onset Alzheimer's dementia without behavioral disturbance ([facility] [address] )        Subjective    "We will try"    PATIENT GOAL: "my memory is bad"      Assessment/Plan    Skilled Analysis:  Pt is a 74 y o  male referred to Occupational Therapy s/p Early onset Alzheimer's dementia without behavioral disturbance ([facility] [address] ) [G30 0, F02 80]  Pt participated in skilled OT evaluation and following formalized testing, presents with the following areas of deficit: STM, LTM/delayed recall, processing speed, orientation, temporal awareness, direction following, attention, insight into deficits, safety awareness and functional sequencing impacting indep and completion of ADL/IADL, salient, and leisure tasks  Pt does demo the need for skilled Occupational Therapy services 1x/week for 4-6 weeks with focus on fxnl cognition, short term memory, family training/education, formalized cognitive assessment and fxnl attention, direction following, and HEP development to address the goals as listed below  Pt in agreement with POC, POC to  w/in 90 days     Extensive education provided to spouse and pt regarding OT POC and development an dparticipation in daily occupations        Goals:  Short Term Goals: (4 weeks-6 weeks)    COGNITION      - Direction Following    o Pt will follow 1 step written directions for improved overall comprehension and engagement in life roals and salient tasks      o Pt will follow 1 step verbal directions in multimodal environment for improved role performance and engagement in salient tasks      - Temporal Orientation    o Pt will be alert and oriented to person and place 50% of the time to inc temporal orientation for appointment keeping and safe IADL practice    o Pt will be alert and oriented to date with use of calendar or memory aide 50% of the time          -      Memory    o Pt will demo inc insight into deficits for overall inc safety and self awareness with ADL/IADL tasks and to encourage use of compensatory strategies  o Pt will participate in cognitively stimulating tasks and HEP 50% of the time to inc engagement and participation in ADL fxn and salient tasks    o Patient and/or family will demo G understanding and use of memory book to inc pt's overall engagement in life roles and to dec frustrations with STM/delayed memory loss      - Attention    o Pt will maintain attention to task for 15 minutes in controlled environment to improve engagement and participation in ADL tasks        Treatment Interventions  Direction following (verbal & written)  ADL safety tasks (sequencing cards, safety cards)  Fxnl Sequencing Tasks (written sequencing, picture sequencing)  Sustained attention tasks (use of timers as needed)  Calendar tasks  Memory Book   Family/Caregiver education  HEP development      HISTORY OF PRESENT ILLNESS:     Pt is a 74 y o  male who was referred to Occupational Therapy s/p  Early onset Alzheimer's dementia without behavioral disturbance ([facility] [address] ) [G30 0, F02 80]  Pt was referred from Geriatrics Dr Meg Ludwig, per chart review "Dementia-patient has followed with Neurology -currently taking donepezil 10 mg patient's wife reports that he had some anxiety 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 and that he has had a continued progressive decline in his cognitive functioning -now with less interest in doing activities or socializing  No longer able to do construction / remodeling or do crossword puzzles  She reports he continues to be independent in basic activities of daily living including bathing dressing toileting  He does not involve himself in finances although wife used to always take care of the finances as well as do the cooking  He does  occasionally still use an TOSIN machine  As well he still does drive although only with his wife present    As well he continues with daily turmeric, vitamin B12 therapies  As per neurology notes patient had MoCA 16/30 with 0/5 recall in Oct 2018 "      Pt is familiar to OP [address], in 2018 participated in OT and [address] with F success per spouse  Pt arrived today for evaluation for Occupational therapy services with spouse  Spouse provided majority of demographics and information due to pt's cognitive impairments  Pt's spouse reports that upon arrival, pt was "upset" that he was at therapy and did not want to participate  Pt able to be redirected to therapy office and was able to participate in conversation and questioning  Of note, pt extremely restless, unable to settle with constant fidgeting and flighting attention  Pts spouse stating pt is able to physically complete ADLs however he requires motivation at times to perform  Additionally, per spouse pt is not engaging or motivated to engage in cognitively stimulating tasks with drastic changes in personality  Of note pt with h/o EVELIA       PMH:   Past Medical History:   Diagnosis Date    Anxiety     BPH with obstruction/lower urinary tract symptoms     LAST ASSESSED: 9/15/14    Colon polyp     Dementia ([facility] [address] )     Depression     Hx of laceration of skin     LAST ASSESSED: 11/3/14    Hyperlipidemia     Memory loss     Pneumonia     LAST ASSESSED: 14    Sleep apnea     Tachycardia     LAST ASSESSED: 3/8/14    Thrombophlebitis of superficial veins of upper extremities     LAST ASSESSED: 64    Uncomplicated alcohol abuse     LAST ASSESSED: 2/28/15    Weakness of both arms     LAST ASSESSED: 14    Weakness of both legs     LAST ASSESSED: 14               Pain Levels:     Restin    With Activity:  0    Objective    Impairment Observations:          Cognitive Function      Cognitive Checklist: Patient indicated that he is experiencing the following symptoms:    · Memory: Remembering what people have told you, Remembering peoples' names, Learning new things, Remembering the date/day of the week and Keeping track of your appointments    · Attention: Easily distracted, Keeping your attention/concentrating on a task, Dividing your attention (i e , multi-tasking) and Losing your train of thought    · Processing: Processing new information , Following directions and Responding to questions in a timely manner     · Executive Functions: Organizing your thoughts, Planning daily tasks, Self-correcting or recognizing mistakes, Initiating/starting tasks and Sequencing activities (such as cooking or following a recipe)    · Communication: Word finding in conversation, Expressing thoughts and ideas fluently and Expressing thoughts and ideas into writing    · Visual: Losing spot on the page when reading    · Emotional: Personality changes, Increased anxiety and Sleep changes    · Increased Sensitivities to: Crowds or busy environments         SCORE DEFICIT RANGE Comments                                         COGNITIVE FXN                    MOCA (8 2         Education Level < 12 years     Visuospatial/executive functioning 1/5     Naming 3/3     Memory: 1st trial: 0/5     Memory: 2nd trial: 0/5     Attention/concentration 2/2     List of letters:  0/1     Serial Seven Subtraction: 0/3     Language/sentence repetition: 0/2     Language Fluency:  0/1     Abstract/Correlational Thinkin/2     Delayed Recall: 0/5     Orientation: 0/6     Memory Index Score 0/15                                        Total Score 6/30 severe neurocognitive deficits           CONTEXTUAL MEMORY TEST (CMT)        unable to perform      Immediate Recall 0/20 sever deficits compared to age group    Delayed Recall 0/20 sever deficits compared to age group                       INTERVENTION COMMENTS:  Diagnosis: Early onset Alzheimer's dementia without behavioral disturbance ([facility] [address] ) [G30 0, F02 80]  Precautions: anxiety, cognitive deficits  FOTO: n/a  Insurance: Payor: MEDICARE / Plan: MEDICARE A AND B / Product Type: Medicare A & B Fee for Service /   1 of 10 visits, PN due 3/30

## 2021-03-02 ENCOUNTER — OFFICE VISIT (OUTPATIENT)
Dept: OCCUPATIONAL THERAPY | Facility: CLINIC | Age: 66
End: 2021-03-02
Payer: MEDICARE

## 2021-03-02 DIAGNOSIS — F02.80 EARLY ONSET ALZHEIMER'S DEMENTIA WITHOUT BEHAVIORAL DISTURBANCE (HCC): Primary | ICD-10-CM

## 2021-03-02 DIAGNOSIS — G30.0 EARLY ONSET ALZHEIMER'S DEMENTIA WITHOUT BEHAVIORAL DISTURBANCE (HCC): Primary | ICD-10-CM

## 2021-03-02 PROCEDURE — 97530 THERAPEUTIC ACTIVITIES: CPT

## 2021-03-02 NOTE — PROGRESS NOTES
Occupational Therapy Daily Note:    Today's date: 3/2/2021  Patient name: Virginia Mera  : 1955  MRN: [id number]  Referring provider: Karmen Abreu MD  Dx:   Encounter Diagnosis   Name Primary?  Early onset Alzheimer's dementia without behavioral disturbance (HCC) Yes             Subjective: Pt not interactive with OTR today, pts spouse reports pt is upset with her bringing him to therapy    Objective: Pt seen for OT treatment session focusing on sustained attention, pt/family education, HEP development, and pt engagement  Pt arrived to todays session with limited eye contact/interaction with OTR, pt's spouse revealed pt is "extremely upset" with her bringing him to therapy  Expressed to spouse that he feels he is being punished  Pt engaged in one game of 2 Card Spot it to address sustained direction following and attention  Pt completed for approximately 10-15 min with 1 rest break when >75% completed  Attempted to re-engage pt in continued therapy exercises with poor success  Pt not engaging or interacting with OTR and continued to demo s/s of frustration  Session terminated early due to pt's lack of engagement and participation due to dec insight and  Comprehension of therapy process  SPouse to continue to keep OTR informed regarding pt readiness to engage in therapy  HEP for temporal orientation and awareness provided    Assessment: Tolerated treatment poor  Patient would benefit from continued skilled OT  Plan: Continued skilled OT per POC      INTERVENTION COMMENTS:  Diagnosis: Early onset Alzheimer's dementia without behavioral disturbance ([facility] [address] ) [G30 0, F02 80]  Precautions: anxiety, cognitive deficits  FOTO: n/a  Insurance: Payor: Fiona Wilcox / Plan: MEDICARE A AND B / Product Type: Medicare A & B Fee for Service /   2 of 10 visits, PN due 3/30

## 2021-03-05 ENCOUNTER — TELEPHONE (OUTPATIENT)
Dept: GERIATRICS | Age: 66
End: 2021-03-05

## 2021-03-05 DIAGNOSIS — K64.9 HEMORRHOIDS, UNSPECIFIED HEMORRHOID TYPE: Primary | ICD-10-CM

## 2021-03-05 NOTE — TELEPHONE ENCOUNTER
Spoke with on call provider Dr Eddi Dominguez  Recommend stop tucks pads and use preperation H cream several times a day, tylenol for pain and sids baths  Order for colorectal referral will be put in  Spoke with wife and she is aware  Wife commented she had not been out of the house alone for 1 mo  Encouraged wife to do some self care as a caregiver  Encouraged her to ask family to care for spouse so she can take care of her self  Relayed importance of taking care of her self  Wife relayed thru daughter GI doctor Dr Kuldeep Batista said that he should go to [facility], so wife is going to make an appointment for him there

## 2021-03-05 NOTE — TELEPHONE ENCOUNTER
Daughter called to report patient is very uncomfortable due to hemorrhoids  Not able to sit well  Patient has been using tucks wipes for Hemorrods  They are only helping a little  Patient has had surgery for Hemorrods is the past      Should the family call colorectal specialist or what to do  Please advise      Patient only sees male providers

## 2021-03-08 ENCOUNTER — OFFICE VISIT (OUTPATIENT)
Dept: OCCUPATIONAL THERAPY | Facility: CLINIC | Age: 66
End: 2021-03-08
Payer: MEDICARE

## 2021-03-08 DIAGNOSIS — G30.0 EARLY ONSET ALZHEIMER'S DEMENTIA WITHOUT BEHAVIORAL DISTURBANCE (HCC): Primary | ICD-10-CM

## 2021-03-08 DIAGNOSIS — F02.80 EARLY ONSET ALZHEIMER'S DEMENTIA WITHOUT BEHAVIORAL DISTURBANCE (HCC): Primary | ICD-10-CM

## 2021-03-08 PROCEDURE — 97150 GROUP THERAPEUTIC PROCEDURES: CPT

## 2021-03-08 PROCEDURE — 97530 THERAPEUTIC ACTIVITIES: CPT

## 2021-03-08 NOTE — PROGRESS NOTES
Occupational Therapy Daily Note:    Today's date: 3/8/2021  Patient name: Jay Jay David  : 1955  MRN: [id number]  Referring provider: Kiarra Avina MD  Dx:   Encounter Diagnosis   Name Primary?  Early onset Alzheimer's dementia without behavioral disturbance ([address] ) Yes     Therapy [id number] unsup   [id number] group   [id number] one on one             Subjective: "I think Im a little lost"    Objective: Pt seen for OT treatment session focusing on sustained attention, direction follow,  fxnl sequencing, and rapport building to inc pts engagement in therapy process and pts ADLs  Pt engaged in Count the Items worksheet to address sustained attention, direction follow, and working memory  Pt initially required max VCs/repetition of directions with step by step assistance to complete task successfully  With repetition, pt demo ability to follow 1 step verbal direction with repetition of direction 50% of the time  Pt engaged in picture fxnl sequencing task to sequence 3 step fxnl item through use of visual cue cards  Pt demo difficulty with identification of task sequence req backward chaining with 25% accuracy with VCs  HEP provided for count the items task    Assessment: Tolerated treatment well  Pt demonstrating an excellent improvement today in overall pt engagement with advancement in patient/therapist rapport  Pt engaging socially with OTR, laughing, and initiating social interactions which is a drastic improvement since previous OT tx sessions  Pts dtr accompanied pt today, pt appeared more open and engaging overall  Dtr requesting home therapy vs virtual as pt is "more comfortable in home environment" per dtr  Will continue to monitor need for home therapy as pt does not sequence ADLs appropriately in home environment  Patient would benefit from continued skilled OT  Plan: Continued skilled OT per POC      INTERVENTION COMMENTS:  Diagnosis: Early onset Alzheimer's dementia without behavioral disturbance ([facility] ) [G30 0, F02 80]  Precautions: anxiety, cognitive deficits  FOTO: n/a  Insurance: Payor: MEDICARE / Plan: MEDICARE A AND B / Product Type: Medicare A & B Fee for Service /   3 of 10 visits, PN due 3/30

## 2021-03-09 ENCOUNTER — APPOINTMENT (OUTPATIENT)
Dept: OCCUPATIONAL THERAPY | Facility: CLINIC | Age: 66
End: 2021-03-09
Payer: MEDICARE

## 2021-03-10 DIAGNOSIS — Z23 ENCOUNTER FOR IMMUNIZATION: ICD-10-CM

## 2021-03-16 ENCOUNTER — OFFICE VISIT (OUTPATIENT)
Dept: OCCUPATIONAL THERAPY | Facility: CLINIC | Age: 66
End: 2021-03-16
Payer: MEDICARE

## 2021-03-16 DIAGNOSIS — G30.0 EARLY ONSET ALZHEIMER'S DEMENTIA WITHOUT BEHAVIORAL DISTURBANCE (HCC): Primary | ICD-10-CM

## 2021-03-16 DIAGNOSIS — F02.80 EARLY ONSET ALZHEIMER'S DEMENTIA WITHOUT BEHAVIORAL DISTURBANCE (HCC): Primary | ICD-10-CM

## 2021-03-16 PROCEDURE — 97530 THERAPEUTIC ACTIVITIES: CPT

## 2021-03-16 NOTE — PROGRESS NOTES
Occupational Therapy Daily Note:    Today's date: 3/16/2021  Patient name: Raymond Bearden  : 1955  MRN: [id number]  Referring provider: Lawyer Ce MD  Dx:   Encounter Diagnosis   Name Primary?  Early onset Alzheimer's dementia without behavioral disturbance ([facility] ) Yes                  Subjective: "Now I'm getting frazzled"    Objective: Pt seen for OT treatment session focusing on sustained attention, direction following, working memory, HEP development and rapport building  Pt engaged in 2 image Spot the difference task to address sustained attention, working memory, and verbal direction follow  Pt instructed to locate 10 differences between two images and [place] differences when located  Pt demo difficulty comprehending instruction to identify difference by circling, downgraded to verbalization of difference when located  Pt able to maintain attention to task to locate 5 differences prior to requiring cue or repeat of verbal direction to continue to search  Pt engaged in 1 step written direction follow worksheet to read 1 step written direction and complete written direction within shape/word/number boxes as directed  Pt able to follow 25% of the time indep requiring modeling and repeat of directions with 75% of tasks  HEP for direction follow    Assessment: Tolerated treatment well  Pt continues to demo an excellent improvement in engagement and participation in skilled OT treatment  Pt has been socially and therapeutically engaged and is participatory and initiating social interactions and conversations  Pt able to participate in direction follow with inc indep, continue to address  Patient would benefit from continued skilled OT  Plan: Continued skilled OT per POC      INTERVENTION COMMENTS:  Diagnosis: Early onset Alzheimer's dementia without behavioral disturbance ([facility] ) [G30 0, F02 80]  Precautions: anxiety, cognitive deficits  FOTO: n/a  Insurance: Payor: Leanna Skelton / Plan: MEDICARE A AND B / Product Type: Medicare A & B Fee for Service /   1 IH 22 PEDRO GARCIA due 3/30

## 2021-03-23 ENCOUNTER — TELEPHONE (OUTPATIENT)
Dept: GERIATRICS | Age: 66
End: 2021-03-23

## 2021-03-23 ENCOUNTER — APPOINTMENT (OUTPATIENT)
Dept: OCCUPATIONAL THERAPY | Facility: CLINIC | Age: 66
End: 2021-03-23
Payer: MEDICARE

## 2021-03-24 ENCOUNTER — OFFICE VISIT (OUTPATIENT)
Dept: GERIATRICS | Age: 66
End: 2021-03-24
Payer: MEDICARE

## 2021-03-24 VITALS
BODY MASS INDEX: 24.2 KG/M2 | HEIGHT: 70 IN | SYSTOLIC BLOOD PRESSURE: 102 MMHG | TEMPERATURE: 97.8 F | DIASTOLIC BLOOD PRESSURE: 60 MMHG | HEART RATE: 67 BPM | OXYGEN SATURATION: 96 % | RESPIRATION RATE: 18 BRPM | WEIGHT: 169 LBS

## 2021-03-24 DIAGNOSIS — G30.0 EARLY ONSET ALZHEIMER'S DISEASE WITH BEHAVIORAL DISTURBANCE (HCC): Primary | ICD-10-CM

## 2021-03-24 DIAGNOSIS — K59.01 SLOW TRANSIT CONSTIPATION: ICD-10-CM

## 2021-03-24 DIAGNOSIS — Z91.89 DRIVING SAFETY ISSUE: Chronic | ICD-10-CM

## 2021-03-24 DIAGNOSIS — F02.81 EARLY ONSET ALZHEIMER'S DISEASE WITH BEHAVIORAL DISTURBANCE (HCC): Primary | ICD-10-CM

## 2021-03-24 DIAGNOSIS — G47.33 OBSTRUCTIVE SLEEP APNEA SYNDROME: Chronic | ICD-10-CM

## 2021-03-24 PROBLEM — F02.818 EARLY ONSET ALZHEIMER'S DISEASE WITH BEHAVIORAL DISTURBANCE (HCC): Status: ACTIVE | Noted: 2020-04-07

## 2021-03-24 PROBLEM — F02.818 EARLY ONSET ALZHEIMER'S DISEASE WITH BEHAVIORAL DISTURBANCE (HCC): Chronic | Status: ACTIVE | Noted: 2020-04-07

## 2021-03-24 PROCEDURE — 99214 OFFICE O/P EST MOD 30 MIN: CPT | Performed by: INTERNAL MEDICINE

## 2021-03-24 RX ORDER — POLYETHYLENE GLYCOL 3350 17 G/17G
17 POWDER, FOR SOLUTION ORAL DAILY PRN
Qty: 510 G | Refills: 5 | COMMUNITY
Start: 2021-03-24 | End: 2021-06-22

## 2021-03-24 NOTE — PROGRESS NOTES
Assessment/Plan:    1  Early onset Alzheimer's disease with behavioral disturbance (HCC)  Assessment & Plan:  Increase citalopram to 40 mg daily  Monitor behaviors for one week  If agitation continues, may restart aripiprazole at 2 mg daily  Orders:  -     CBC; Future  -     Basic metabolic panel; Future    2  Obstructive sleep apnea syndrome  Assessment & Plan:  Continue CPAP      3  Driving safety issue  Assessment & Plan:  Continue driving cessation      4  Slow transit constipation  Assessment & Plan:  Stable  Continue with Miralax as PRN only  Orders:  -     polyethylene glycol (GLYCOLAX) 17 GM/SCOOP powder; Take 17 g by mouth daily as needed (constipation)        Subjective:      Patient ID: Abdulaziz Barcenas is a 74 y o  male  HPI    Dementia - with significant increased anxiety, restlessness, some symptoms of paranoia, also not recognizing family at times, some aggressiveness and angry  Has not used lorazepam   He continues off of aripiprazole, continues on reduced citalopram and continues on donepezil  Driving safety - wife reports now not driving    H L  - continues on pravastatin    The following portions of the patient's history were reviewed and updated as appropriate: allergies, current medications, past family history, past medical history, past social history, past surgical history and problem list     Review of Systems   Unable to perform ROS: Dementia         Objective:    /60 (BP Location: Right arm, Patient Position: Sitting, Cuff Size: Adult)   Pulse 67   Temp 97 8 °F (36 6 °C) (Temporal)   Resp 18   Ht 5' 10 39" (1 788 m) Comment: with shoes  Wt 76 7 kg (169 lb) Comment: with shoes  SpO2 96%   BMI 23 98 kg/m²      Physical Exam  Constitutional:       General: He is not in acute distress  Appearance: He is well-developed  He is not diaphoretic  Eyes:      General: No scleral icterus  Right eye: No discharge  Left eye: No discharge  Conjunctiva/sclera: Conjunctivae normal    Cardiovascular:      Rate and Rhythm: Normal rate and regular rhythm  Heart sounds: Normal heart sounds  Pulmonary:      Effort: Pulmonary effort is normal  No respiratory distress  Breath sounds: Normal breath sounds  Abdominal:      General: Bowel sounds are normal  There is no distension  Palpations: Abdomen is soft  Musculoskeletal:         General: No tenderness  Skin:     General: Skin is warm and dry  Neurological:      General: No focal deficit present  Mental Status: He is alert  He is disoriented  Psychiatric:      Comments: Apathetic appearing, poor insight  Mildly anxious appearing

## 2021-03-24 NOTE — ASSESSMENT & PLAN NOTE
Increase citalopram to 40 mg daily  Monitor behaviors for one week  If agitation continues, may restart aripiprazole at 2 mg daily

## 2021-03-31 ENCOUNTER — PATIENT MESSAGE (OUTPATIENT)
Dept: GERIATRICS | Age: 66
End: 2021-03-31

## 2021-03-31 ENCOUNTER — TELEPHONE (OUTPATIENT)
Dept: GERIATRICS | Age: 66
End: 2021-03-31

## 2021-03-31 DIAGNOSIS — F03.90 DEMENTIA WITHOUT BEHAVIORAL DISTURBANCE, UNSPECIFIED DEMENTIA TYPE (HCC): ICD-10-CM

## 2021-03-31 DIAGNOSIS — F33.1 MAJOR DEPRESSIVE DISORDER, RECURRENT, MODERATE (HCC): ICD-10-CM

## 2021-03-31 NOTE — TELEPHONE ENCOUNTER
Patient's daughter, Nyla Merritt is requesting intermittent FMLA so she can assist with taking patient to appointments and staying with him for short periods of time during a day

## 2021-04-01 RX ORDER — CITALOPRAM 40 MG/1
40 TABLET ORAL DAILY
Qty: 90 TABLET | Refills: 3 | Status: SHIPPED | OUTPATIENT
Start: 2021-04-01 | End: 2021-05-26

## 2021-04-01 RX ORDER — DONEPEZIL HYDROCHLORIDE 10 MG/1
10 TABLET, FILM COATED ORAL
Qty: 90 TABLET | Refills: 3 | Status: SHIPPED | OUTPATIENT
Start: 2021-04-01 | End: 2021-06-30 | Stop reason: SDUPTHER

## 2021-04-02 ENCOUNTER — APPOINTMENT (OUTPATIENT)
Dept: OCCUPATIONAL THERAPY | Facility: CLINIC | Age: 66
End: 2021-04-02
Payer: MEDICARE

## 2021-04-08 ENCOUNTER — OFFICE VISIT (OUTPATIENT)
Dept: OCCUPATIONAL THERAPY | Facility: CLINIC | Age: 66
End: 2021-04-08
Payer: MEDICARE

## 2021-04-08 DIAGNOSIS — G30.0 EARLY ONSET ALZHEIMER'S DEMENTIA WITHOUT BEHAVIORAL DISTURBANCE (HCC): Primary | ICD-10-CM

## 2021-04-08 DIAGNOSIS — F02.80 EARLY ONSET ALZHEIMER'S DEMENTIA WITHOUT BEHAVIORAL DISTURBANCE (HCC): Primary | ICD-10-CM

## 2021-04-08 PROCEDURE — 97530 THERAPEUTIC ACTIVITIES: CPT

## 2021-04-08 NOTE — PROGRESS NOTES
Occupational Therapy Daily Note:    Today's date: 2021  Patient name: Mc Ambrose  : 1955  MRN: [id number]  Referring provider: Dayanara Daigle MD  Dx:   Encounter Diagnosis   Name Primary?  Early onset Alzheimer's dementia without behavioral disturbance ([facility] ) Yes                  Subjective: "im sorry I just dont know whats going on"    Objective: Pt seen for OT treatment session focusing on direction following, working memory, auditory processing, and HEP development    Pt engaged in verbal direction follow and auditory processing to name object being described verbally by OTR to complete simple crossword puzzle  Pt able to complete with 25% accuracy indep with use of multimodal cues to identify remaining items appropriately  Pt engaged in categorical worksheet to identify category of 3 common words, then identify and name additional item within same category to address attention and categorical organization  HEP provided for carryover of categorical tasks    Assessment: Tolerated treatment well  Pt without hearing aides and glasses today decreased processing speed and comprehension  Recommending spouse provide next treatment session  Patient would benefit from continued skilled OT  Plan: Continued skilled OT per POC      INTERVENTION COMMENTS:  Diagnosis: Early onset Alzheimer's dementia without behavioral disturbance ([facility] ) [G30 0, F02 80]  Precautions: anxiety, cognitive deficits  FOTO: n/a  Insurance: Payor: MEDICARE / Plan: MEDICARE A AND B / Product Type: Medicare A & B Fee for Service /    OL 42 [id number], PN due 3/30

## 2021-04-12 ENCOUNTER — OFFICE VISIT (OUTPATIENT)
Dept: OCCUPATIONAL THERAPY | Facility: CLINIC | Age: 66
End: 2021-04-12
Payer: MEDICARE

## 2021-04-12 DIAGNOSIS — G30.0 EARLY ONSET ALZHEIMER'S DEMENTIA WITHOUT BEHAVIORAL DISTURBANCE (HCC): Primary | ICD-10-CM

## 2021-04-12 DIAGNOSIS — F02.80 EARLY ONSET ALZHEIMER'S DEMENTIA WITHOUT BEHAVIORAL DISTURBANCE (HCC): Primary | ICD-10-CM

## 2021-04-12 PROCEDURE — 97530 THERAPEUTIC ACTIVITIES: CPT

## 2021-04-12 NOTE — PROGRESS NOTES
Occupational Therapy Daily Note:    Today's date: 2021  Patient name: Rosario Land  : 1955  MRN: [id number]  Referring provider: Christian Be MD  Dx:   Encounter Diagnosis   Name Primary?  Early onset Alzheimer's dementia without behavioral disturbance ([facility] ) Yes                  Subjective: "I have a hard time getting him to do the homework" - per pt's spouse  Objective: Pt seen for OT treatment session focusing on fxnl attention, verbal direction following, sequencing and cognitive organization  Pt engaged in [place] for sustained attention and direction follow for [place] Cancellation task  Pt provided verbal instruction to locate bells within cluttered field and tap when located  Pt required > 24 minutes to locate bells on screen  Pt easily distractable requiring repetition of directions 50% of the time with redirection >75% of the time  Pt located 8 bells repetitively prior to requiring cue to persist      Pt completed numerical sequencing on BITS to draw connecting line starting at number 1 and advancing in sequential order  Pt require cues for place keeping >50% of the time with 11 min to complete # 1-25    Pt engaged in figure copy task for sustained attention and direction follow  Pt required to copy grid pattern on screen with 75% accuracy overall    Assessment: Tolerated treatment well  Pts spouse reports pt has difficulty attending and completing HEP as provided  Pt stated that pt has increased anxiety in home environment and is resistive to directions provided by spouse  OT recommending routine building to provide schedule expectation  Patient would benefit from continued skilled OT  Plan: Continued skilled OT per POC      INTERVENTION COMMENTS:  Diagnosis: Early onset Alzheimer's dementia without behavioral disturbance ([facility] ) [G30 0, F02 80]  Precautions: anxiety, cognitive deficits  FOTO: n/a  Insurance: Payor: MEDICARE / Plan: MEDICARE A AND B / Product Type: Medicare A & B Fee for Service /   3 BB 05 [facility], PN due 3/30

## 2021-04-19 ENCOUNTER — APPOINTMENT (OUTPATIENT)
Dept: LAB | Facility: MEDICAL CENTER | Age: 66
End: 2021-04-19
Payer: MEDICARE

## 2021-04-19 DIAGNOSIS — G30.0 EARLY ONSET ALZHEIMER'S DISEASE WITH BEHAVIORAL DISTURBANCE (HCC): ICD-10-CM

## 2021-04-19 DIAGNOSIS — G47.33 OSA (OBSTRUCTIVE SLEEP APNEA): ICD-10-CM

## 2021-04-19 DIAGNOSIS — G31.84 MILD COGNITIVE IMPAIRMENT WITH MEMORY LOSS: ICD-10-CM

## 2021-04-19 DIAGNOSIS — F02.81 EARLY ONSET ALZHEIMER'S DISEASE WITH BEHAVIORAL DISTURBANCE (HCC): ICD-10-CM

## 2021-04-19 DIAGNOSIS — E78.01 FAMILIAL HYPERCHOLESTEROLEMIA: Chronic | ICD-10-CM

## 2021-04-19 LAB
ALBUMIN SERPL BCP-MCNC: 3.9 G/DL (ref 3.5–5)
ALP SERPL-CCNC: 84 U/L (ref 46–116)
ALT SERPL W P-5'-P-CCNC: 27 U/L (ref 12–78)
ANION GAP SERPL CALCULATED.3IONS-SCNC: 5 MMOL/L (ref 4–13)
AST SERPL W P-5'-P-CCNC: 18 U/L (ref 5–45)
BASOPHILS # BLD AUTO: 0.04 THOUSANDS/ΜL (ref 0–0.1)
BASOPHILS NFR BLD AUTO: 1 % (ref 0–1)
BILIRUB SERPL-MCNC: 1.16 MG/DL (ref 0.2–1)
BUN SERPL-MCNC: 18 MG/DL (ref 5–25)
CALCIUM SERPL-MCNC: 9.1 MG/DL (ref 8.3–10.1)
CHLORIDE SERPL-SCNC: 105 MMOL/L (ref 100–108)
CHOLEST SERPL-MCNC: 187 MG/DL (ref 50–200)
CO2 SERPL-SCNC: 28 MMOL/L (ref 21–32)
CREAT SERPL-MCNC: 1.05 MG/DL (ref 0.6–1.3)
EOSINOPHIL # BLD AUTO: 0.26 THOUSAND/ΜL (ref 0–0.61)
EOSINOPHIL NFR BLD AUTO: 6 % (ref 0–6)
ERYTHROCYTE [DISTWIDTH] IN BLOOD BY AUTOMATED COUNT: 12.7 % (ref 11.6–15.1)
GFR SERPL CREATININE-BSD FRML MDRD: 73 ML/MIN/1.73SQ M
GLUCOSE P FAST SERPL-MCNC: 90 MG/DL (ref 65–99)
HCT VFR BLD AUTO: 44.4 % (ref 36.5–49.3)
HDLC SERPL-MCNC: 68 MG/DL
HGB BLD-MCNC: 14.8 G/DL (ref 12–17)
IMM GRANULOCYTES # BLD AUTO: 0.01 THOUSAND/UL (ref 0–0.2)
IMM GRANULOCYTES NFR BLD AUTO: 0 % (ref 0–2)
LDLC SERPL CALC-MCNC: 109 MG/DL (ref 0–100)
LDLC SERPL DIRECT ASSAY-MCNC: 106 MG/DL (ref 0–100)
LYMPHOCYTES # BLD AUTO: 1.32 THOUSANDS/ΜL (ref 0.6–4.47)
LYMPHOCYTES NFR BLD AUTO: 30 % (ref 14–44)
MCH RBC QN AUTO: 30.1 PG (ref 26.8–34.3)
MCHC RBC AUTO-ENTMCNC: 33.3 G/DL (ref 31.4–37.4)
MCV RBC AUTO: 90 FL (ref 82–98)
MONOCYTES # BLD AUTO: 0.57 THOUSAND/ΜL (ref 0.17–1.22)
MONOCYTES NFR BLD AUTO: 13 % (ref 4–12)
NEUTROPHILS # BLD AUTO: 2.2 THOUSANDS/ΜL (ref 1.85–7.62)
NEUTS SEG NFR BLD AUTO: 50 % (ref 43–75)
NONHDLC SERPL-MCNC: 119 MG/DL
NRBC BLD AUTO-RTO: 0 /100 WBCS
PLATELET # BLD AUTO: 200 THOUSANDS/UL (ref 149–390)
PMV BLD AUTO: 10.2 FL (ref 8.9–12.7)
POTASSIUM SERPL-SCNC: 4.3 MMOL/L (ref 3.5–5.3)
PROT SERPL-MCNC: 6.8 G/DL (ref 6.4–8.2)
RBC # BLD AUTO: 4.91 MILLION/UL (ref 3.88–5.62)
SODIUM SERPL-SCNC: 138 MMOL/L (ref 136–145)
TRIGL SERPL-MCNC: 52 MG/DL
WBC # BLD AUTO: 4.4 THOUSAND/UL (ref 4.31–10.16)

## 2021-04-19 PROCEDURE — 83721 ASSAY OF BLOOD LIPOPROTEIN: CPT

## 2021-04-19 PROCEDURE — 85025 COMPLETE CBC W/AUTO DIFF WBC: CPT

## 2021-04-19 PROCEDURE — 80053 COMPREHEN METABOLIC PANEL: CPT

## 2021-04-19 PROCEDURE — 80061 LIPID PANEL: CPT

## 2021-04-19 PROCEDURE — 36415 COLL VENOUS BLD VENIPUNCTURE: CPT

## 2021-04-20 ENCOUNTER — TELEPHONE (OUTPATIENT)
Dept: INTERNAL MEDICINE CLINIC | Facility: CLINIC | Age: 66
End: 2021-04-20

## 2021-04-20 ENCOUNTER — OFFICE VISIT (OUTPATIENT)
Dept: OCCUPATIONAL THERAPY | Facility: CLINIC | Age: 66
End: 2021-04-20
Payer: MEDICARE

## 2021-04-20 DIAGNOSIS — F02.80 EARLY ONSET ALZHEIMER'S DEMENTIA WITHOUT BEHAVIORAL DISTURBANCE (HCC): Primary | ICD-10-CM

## 2021-04-20 DIAGNOSIS — G30.0 EARLY ONSET ALZHEIMER'S DEMENTIA WITHOUT BEHAVIORAL DISTURBANCE (HCC): Primary | ICD-10-CM

## 2021-04-20 PROCEDURE — 97530 THERAPEUTIC ACTIVITIES: CPT

## 2021-04-20 NOTE — PROGRESS NOTES
Occupational Therapy Daily Note:    Today's date: 2021  Patient name: Saint Cherry  : 1955  MRN: [id number]  Referring provider: Andrew Leonardo MD  Dx:   Encounter Diagnosis   Name Primary?  Early onset Alzheimer's dementia without behavioral disturbance ([email or web address] ) Yes                  Subjective: "oh my memory's really getting bad"    Objective: Pt seen for OT treatment session focusing on sustained attention, working memory, and direction following  Pt tolerated 3 min x 2 (prograde/retrograde motions at 1 5 resist) seated on UBE with focus on improving cognitive engagement and rehabilitation  Pt engaged in Airc[employer] in [place] it game to address sustained attention, working memory and direction following  Pt provided with card with 6 images with specified colors to locate within cluttered field  Pt provided with verbal direction to locate puzzle piece within puzzle board  Pt with poor recall of direction, requiring repetition of directions >75% of the time  Pt able to sustain/maintain attention         Assessment: Tolerated treatment well  Pt continues to be easily distractable with structured cognitive tasks  Will continue to incorporate therapeutic exercise as prep to cognitive tasks to inc BDNF for inc cognitive performance on structured tasks  Patient would benefit from continued skilled OT  Plan: Continued skilled OT per POC      INTERVENTION COMMENTS:  Diagnosis: Early onset Alzheimer's dementia without behavioral disturbance ([email or web address] ) [G30 0, F02 80]  Precautions: anxiety, cognitive deficits  FOTO: n/a  Insurance: Payor: MEDICARE / Plan: MEDICARE A AND B / Product Type: Medicare A & B Fee for Service /   2 ME 87 [id number], PN due 3/30

## 2021-04-20 NOTE — TELEPHONE ENCOUNTER
----- Message from Adore Arboleda DO sent at 4/19/2021  5:20 PM EDT -----  Please check to see if elliot is still followed by us  It looks like he has a new PCP  Please let him know that his LDL cholesterol is slightly elevated compared to 6 months ago  He needs to diet and exercise

## 2021-04-22 ENCOUNTER — OFFICE VISIT (OUTPATIENT)
Dept: GERIATRICS | Age: 66
End: 2021-04-22
Payer: MEDICARE

## 2021-04-22 VITALS
BODY MASS INDEX: 25.48 KG/M2 | WEIGHT: 167.6 LBS | RESPIRATION RATE: 18 BRPM | TEMPERATURE: 97.9 F | OXYGEN SATURATION: 94 % | DIASTOLIC BLOOD PRESSURE: 64 MMHG | HEART RATE: 82 BPM | SYSTOLIC BLOOD PRESSURE: 106 MMHG

## 2021-04-22 DIAGNOSIS — E78.2 MIXED HYPERLIPIDEMIA: Chronic | ICD-10-CM

## 2021-04-22 DIAGNOSIS — F02.81 EARLY ONSET ALZHEIMER'S DISEASE WITH BEHAVIORAL DISTURBANCE (HCC): Primary | Chronic | ICD-10-CM

## 2021-04-22 DIAGNOSIS — F41.9 ANXIETY: Chronic | ICD-10-CM

## 2021-04-22 DIAGNOSIS — G30.0 EARLY ONSET ALZHEIMER'S DISEASE WITH BEHAVIORAL DISTURBANCE (HCC): Primary | Chronic | ICD-10-CM

## 2021-04-22 PROCEDURE — 99214 OFFICE O/P EST MOD 30 MIN: CPT | Performed by: INTERNAL MEDICINE

## 2021-04-22 RX ORDER — MEMANTINE HYDROCHLORIDE 10 MG/1
10 TABLET ORAL 2 TIMES DAILY
Qty: 180 TABLET | Refills: 3 | Status: SHIPPED | OUTPATIENT
Start: 2021-05-07 | End: 2021-01-01 | Stop reason: SDUPTHER

## 2021-04-22 RX ORDER — MEMANTINE HYDROCHLORIDE 5 MG/1
5 TABLET ORAL 2 TIMES DAILY
Qty: 28 TABLET | Refills: 0 | Status: SHIPPED | OUTPATIENT
Start: 2021-04-22 | End: 2021-01-01

## 2021-04-22 NOTE — PROGRESS NOTES
Assessment/Plan:    1  Early onset Alzheimer's disease with behavioral disturbance (HCC)  Assessment & Plan:  Continue donepezil  Start memantine 5 mg BID x 2 weeks, followed by 10 mg BID  Continue to monitor cognition, mood and anxiety  Counseled on techniques for handing anxiety with redirection  Patient will have poor ability to engage in more complex reasoning or CBT strategies to improve anxiety  Orders:  -     memantine (NAMENDA) 5 mg tablet; Take 1 tablet (5 mg total) by mouth 2 (two) times a day  -     memantine (NAMENDA) 10 mg tablet; Take 1 tablet (10 mg total) by mouth 2 (two) times a day    2  Mixed hyperlipidemia  Assessment & Plan:  Stable  Continue pravastatin  3  Anxiety  Assessment & Plan:  Continue monitor while starting memantine  Continue citalopram   May consider restarting aripiprazole  Subjective:      Patient ID: Osmar Hernández is a 74 y o  male  HPI    Dementia - wife is picking out his clothes  He continues to be independent in toileting, eating  She Wife concerned about continued agitation  As well, he can be somewhat hypersexual, asking for sex despite recent sex  He continues on citalopram 40mg daily  They did use lorazepam once for more severe agitation  He continues to become more agitated if wife mows the lawn or if strange people are in the house  He has been doing well with OT and cognitive therapy    Continues on donepezil    H L   continues on statin    The following portions of the patient's history were reviewed and updated as appropriate: allergies, current medications, past family history, past medical history, past social history, past surgical history and problem list     Review of Systems   Unable to perform ROS: Dementia         Objective:    /64 (BP Location: Right arm, Patient Position: Sitting, Cuff Size: Standard)   Pulse 82   Temp 97 9 °F (36 6 °C) (Temporal)   Resp 18   Wt 76 kg (167 lb 9 6 oz)   SpO2 94%   BMI 25 48 kg/m²      Physical Exam  Constitutional:       General: He is not in acute distress  Appearance: He is well-developed  He is not diaphoretic  Eyes:      General: No scleral icterus  Right eye: No discharge  Left eye: No discharge  Conjunctiva/sclera: Conjunctivae normal    Cardiovascular:      Rate and Rhythm: Normal rate  Pulmonary:      Effort: Pulmonary effort is normal  No respiratory distress  Breath sounds: Normal breath sounds  Abdominal:      General: Bowel sounds are normal  There is no distension  Palpations: Abdomen is soft  Musculoskeletal:         General: No tenderness  Skin:     General: Skin is warm and dry  Neurological:      General: No focal deficit present  Mental Status: He is alert  He is disoriented  Psychiatric:      Comments: Apathetic appearing  Poor insight             LABS:  Lab Results   Component Value Date     09/07/2017    SODIUM 138 04/19/2021    K 4 3 04/19/2021     04/19/2021    CO2 28 04/19/2021    ANIONGAP 8 02/16/2014    AGAP 5 04/19/2021    BUN 18 04/19/2021    CREATININE 1 05 04/19/2021    GLUC 69 06/11/2020    GLUF 90 04/19/2021    CALCIUM 9 1 04/19/2021    AST 18 04/19/2021    ALT 27 04/19/2021    ALKPHOS 84 04/19/2021    PROT 6 3 09/07/2017    TP 6 8 04/19/2021    BILITOT 0 5 09/07/2017    TBILI 1 16 (H) 04/19/2021    EGFR 73 04/19/2021     Lab Results   Component Value Date    WBC 4 40 04/19/2021    HGB 14 8 04/19/2021    HCT 44 4 04/19/2021    MCV 90 04/19/2021     04/19/2021     Lab Results   Component Value Date    LDLCALC 109 (H) 04/19/2021     Lab Results   Component Value Date    CHOLESTEROL 187 04/19/2021    CHOLESTEROL 181 10/06/2020    CHOLESTEROL 187 06/11/2020     Lab Results   Component Value Date    HDL 68 04/19/2021    HDL 73 10/06/2020    HDL 74 06/11/2020     Lab Results   Component Value Date    TRIG 52 04/19/2021    TRIG 67 10/06/2020    TRIG 61 06/11/2020     Lab Results   Component Value Date    [facility] 119 04/19/2021

## 2021-04-22 NOTE — ASSESSMENT & PLAN NOTE
Continue donepezil  Start memantine 5 mg BID x 2 weeks, followed by 10 mg BID  Continue to monitor cognition, mood and anxiety  Counseled on techniques for handing anxiety with redirection  Patient will have poor ability to engage in more complex reasoning or CBT strategies to improve anxiety

## 2021-04-22 NOTE — ASSESSMENT & PLAN NOTE
Continue monitor while starting memantine  Continue citalopram   May consider restarting aripiprazole

## 2021-04-27 ENCOUNTER — APPOINTMENT (OUTPATIENT)
Dept: OCCUPATIONAL THERAPY | Facility: CLINIC | Age: 66
End: 2021-04-27
Payer: MEDICARE

## 2021-04-29 ENCOUNTER — APPOINTMENT (OUTPATIENT)
Dept: OCCUPATIONAL THERAPY | Facility: CLINIC | Age: 66
End: 2021-04-29
Payer: MEDICARE

## 2021-04-29 DIAGNOSIS — E78.01 FAMILIAL HYPERCHOLESTEROLEMIA: Chronic | ICD-10-CM

## 2021-04-29 RX ORDER — PRAVASTATIN SODIUM 40 MG
40 TABLET ORAL DAILY
Qty: 90 TABLET | Refills: 3 | Status: SHIPPED | OUTPATIENT
Start: 2021-04-29 | End: 2021-01-01 | Stop reason: SDUPTHER

## 2021-05-04 ENCOUNTER — OFFICE VISIT (OUTPATIENT)
Dept: OCCUPATIONAL THERAPY | Facility: CLINIC | Age: 66
End: 2021-05-04
Payer: MEDICARE

## 2021-05-04 DIAGNOSIS — G30.0 EARLY ONSET ALZHEIMER'S DEMENTIA WITHOUT BEHAVIORAL DISTURBANCE (HCC): Primary | ICD-10-CM

## 2021-05-04 DIAGNOSIS — F02.80 EARLY ONSET ALZHEIMER'S DEMENTIA WITHOUT BEHAVIORAL DISTURBANCE (HCC): Primary | ICD-10-CM

## 2021-05-04 PROCEDURE — 97530 THERAPEUTIC ACTIVITIES: CPT

## 2021-05-04 NOTE — PROGRESS NOTES
Occupational Therapy Daily Note:    Today's date: 2021  Patient name: Colin Sethi  : 1955  MRN: [id number]  Referring provider: Betsy Liriano MD  Dx:   Encounter Diagnosis   Name Primary?  Early onset Alzheimer's dementia without behavioral disturbance ([facility] ) Yes                  Subjective: " I think I did that better right?"    Objective: Pt seen for OT treatment session focusing on written comprehension, direction following, pt engagement, auditory processing, and HEP development  Pt tolerated 5 min x 2 (prograde/retrograde motions at 1 5 resist) seated on UBE with focus on increasing pt's engagement and cardiovascular exercise routine to prepare for cognitively stimulating tasks to inc overall arousal and participation  Pt engaged in word exclusion written direction and reading comprehension worksheet to address sustained attention and processing skills  Pt required to read 4-5 words and answer one question of exclusion  Pt required to respond by circling responses with that direction given verbally by OTR  Pt req max A initially fading to min A with repetition  HEP provided to continue with reading comprehension and categorical exclusion worksheets      Assessment: Tolerated treatment well  Pt without hearing aides today due to losing battery req repetition of directions frequently  Patient would benefit from continued skilled OT  Plan: Continued skilled OT per POC      INTERVENTION COMMENTS:  Diagnosis: Early onset Alzheimer's dementia without behavioral disturbance ([facility] ) [G30 0, F02 80]  Precautions: anxiety, cognitive deficits  FOTO: n/a  Insurance: Payor: MEDICARE / Plan: MEDICARE A AND B / Product Type: Medicare A & B Fee for Service /   1 CS 87 [id number], PN due 3/30

## 2021-05-06 ENCOUNTER — EVALUATION (OUTPATIENT)
Dept: OCCUPATIONAL THERAPY | Facility: CLINIC | Age: 66
End: 2021-05-06
Payer: MEDICARE

## 2021-05-06 DIAGNOSIS — G30.0 EARLY ONSET ALZHEIMER'S DEMENTIA WITHOUT BEHAVIORAL DISTURBANCE (HCC): Primary | ICD-10-CM

## 2021-05-06 DIAGNOSIS — F02.80 EARLY ONSET ALZHEIMER'S DEMENTIA WITHOUT BEHAVIORAL DISTURBANCE (HCC): Primary | ICD-10-CM

## 2021-05-06 PROCEDURE — 96125 COGNITIVE TEST BY HC PRO: CPT

## 2021-05-06 NOTE — PROGRESS NOTES
OCCUPATIONAL THERAPY PROGRESS UPDATE    2021  Peter Marie  1955  [id number]  Mynor Valdez MD  1  Early onset Alzheimer's dementia without behavioral disturbance (HCC)      TIME: Administration of RIPA G, scoring, interpretation, documentation, and POC development >91 minutes  Testing 60 min total  Subjective    "Im really messing you up now"    PATIENT GOAL: "my memory is bad"      Assessment/Plan    Skilled Analysis:  Pt is a 74 y o  male referred to Occupational Therapy s/p Early onset Alzheimer's dementia without behavioral disturbance ([facility] [address] ) [G30 0, F02 80]  Pt participated in skilled OT re-evaluation today with clinical decision to perform RIPA G assessment vs MOCA and CMT due to severe difficulties with attention/concentration and direction follow/comprehension with EF skills  Pt performed RIPA-G to assess skills in situational knowledge, safety, temporal orientation and overall awareness  Ot has been more participatory and engaged in treatment since IE, pt is able to follow/comrpehend one step directions with increased time and repetition 25% of the time with improving attention to task  Pt is extremely tangential throughout  Following formalized testing, presents with the following areas of deficit: STM, LTM/delayed recall, processing speed, orientation, temporal awareness, direction following, attention, insight into deficits, safety awareness and functional sequencing impacting indep and completion of ADL/IADL, salient, and leisure tasks  Pt does demo the need for skilled Occupational Therapy services 2x/week for 4-6 weeks with focus on fxnl cognition, short term memory, family training/education, formalized cognitive assessment and fxnl attention, direction following, and HEP development with plans to further address situational awareness, orientation, and temporal awareness to address the goals as listed below   Pt in agreement with POC, POC to  w/in 90 days of note, pt without hearing aides today impacting comprehension  Goals more focused on safety and increasing pt's engagement/participation vs cognitive rehabilitation  Goals:  Short Term Goals: (4 weeks-6 weeks)    COGNITION      - Direction Following    o Pt will follow 1 step written directions for improved overall comprehension and engagement in life roals and salient tasks      o Pt will follow 1 step verbal directions in multimodal environment for improved role performance and engagement in salient tasks      - Temporal Orientation    o Pt will be alert and oriented to person and place 50% of the time to inc temporal orientation for appointment keeping and safe IADL practice    o Pt will be alert and oriented to date with use of calendar or memory aide 50% of the time          -      Memory    o Pt will demo inc insight into deficits for overall inc safety and self awareness with ADL/IADL tasks and to encourage use of compensatory strategies  o Pt will participate in cognitively stimulating tasks and HEP 50% of the time to inc engagement and participation in ADL fxn and salient tasks    o Patient and/or family will demo G understanding and use of memory book to inc pt's overall engagement in life roles and to dec frustrations with STM/delayed memory loss      - Attention    o Pt will maintain attention to task for 15 minutes in controlled environment to improve engagement and participation in ADL tasks        Treatment Interventions  Direction following (verbal & written)  ADL safety tasks (sequencing cards, safety cards)  Fxnl Sequencing Tasks (written sequencing, picture sequencing)  Sustained attention tasks (use of timers as needed)  Calendar tasks  Memory Book   Family/Caregiver education  HEP development                Pain Levels:     Restin    With Activity:  0    Objective    Impairment Observations:          Cognitive Function      Cognitive Checklist: Patient indicated that he is experiencing the following symptoms:    · Memory: Remembering what people have told you, Remembering peoples' names, Learning new things, Remembering the date/day of the week and Keeping track of your appointments    · Attention: Easily distracted, Keeping your attention/concentrating on a task, Dividing your attention (i e , multi-tasking) and Losing your train of thought    · Processing: Processing new information , Following directions and Responding to questions in a timely manner     · Executive Functions: Organizing your thoughts, Planning daily tasks, Self-correcting or recognizing mistakes, Initiating/starting tasks and Sequencing activities (such as cooking or following a recipe)    · Communication: Word finding in conversation, Expressing thoughts and ideas fluently and Expressing thoughts and ideas into writing    · Visual: Losing spot on the page when reading    · Emotional: Personality changes, Increased anxiety and Sleep changes    · Increased Sensitivities to: Crowds or busy environments       RIPA-    RIPA        Subtest Scores Raw Scores Scaled Scores % Rank Degree of Severity   Immediate memory 9 1 1 severe   Temporal Orientation 15 1 1 severe   Spatial Orientation 26 1 1 severe   General Knowledge 8 1 1 severe   Situational Knowledge 18 1 1 severe   Categorical Vocabulary 8 1 1 severe   Listening Comprehension 0 1 1 severe          Composite Index Sum of scaled scores Composite Index % rank Degree of Severity   Information Processing Index 7  1 severe          Diacritical Scores Summary       error 0 confabulation 0    perseveration  partially correct 3/76    repetition  irrelevant     denial 0 tangential     delayed response  self-corrected

## 2021-05-11 ENCOUNTER — OFFICE VISIT (OUTPATIENT)
Dept: OCCUPATIONAL THERAPY | Facility: CLINIC | Age: 66
End: 2021-05-11
Payer: MEDICARE

## 2021-05-11 DIAGNOSIS — G30.0 EARLY ONSET ALZHEIMER'S DEMENTIA WITHOUT BEHAVIORAL DISTURBANCE (HCC): Primary | ICD-10-CM

## 2021-05-11 DIAGNOSIS — F02.80 EARLY ONSET ALZHEIMER'S DEMENTIA WITHOUT BEHAVIORAL DISTURBANCE (HCC): Primary | ICD-10-CM

## 2021-05-11 PROCEDURE — 97530 THERAPEUTIC ACTIVITIES: CPT

## 2021-05-11 NOTE — PROGRESS NOTES
Occupational Therapy Daily Note:    Today's date: 2021  Patient name: Daphney Canchola  : 1955  MRN: [id number]  Referring provider: Tej Moe MD  Dx:   Encounter Diagnosis   Name Primary?  Early onset Alzheimer's dementia without behavioral disturbance (HCC) Yes                  Subjective: "Im really goofed up now"    Objective: Pt seen for OT treatment session focusing on fxnl participation/engagement, direction follow, fxnl attention    Pt tolerated 15 min on NuStep to inc participation and engagement in OT tx with focus on increasing cardiovascular endurance to activate BDNF prior to cognitively stimulating and engaging tasks  Pt engaged in number sequencing/matching task to inc verbal direction follow and sustained attention to dec caregiver burden and allow for inc participation in ADL/IADL fxn  Pt provided with 7 number sequence and required to locate within [place] cards in cluttered field and place to vertical target on L in sequential order  Pt required Max cueing initially to maintain attention, following one step direction, and complete task through completion  With repetition, pt demo ability to complete with repetition of direction 50 % of the time with improved ability to initiate self checking behaviors such as seeking affirmation and utilizing visual cues appropriately  Pt initially provided with inc visual stimuli, downgraded to reduce additional stimuli to allow for improved attention, concentration and direction follow  Assessment: Tolerated treatment well  Pt demonstrating improved sustained attention and direction follow with repetition and modifications to task  Pt continues to demo deficits in sustained attention and direction follow requiring repetition of cues, directions, and affirmations  Patient would benefit from continued skilled OT        INTERVENTION COMMENTS:  Diagnosis: Early onset Alzheimer's dementia without behavioral disturbance ([facility] [address] ) [G30 0, F02 80]  Precautions: anxiety, cognitive deficits  FOTO: n/a  Insurance: Payor: MEDICARE / Plan: MEDICARE A AND B / Product Type: Medicare A & B Fee for Service /   2 KG 30 SXSVTX, PN due 6/7

## 2021-05-13 ENCOUNTER — OFFICE VISIT (OUTPATIENT)
Dept: OCCUPATIONAL THERAPY | Facility: CLINIC | Age: 66
End: 2021-05-13
Payer: MEDICARE

## 2021-05-13 ENCOUNTER — TELEPHONE (OUTPATIENT)
Dept: GERIATRICS | Age: 66
End: 2021-05-13

## 2021-05-13 DIAGNOSIS — F02.80 EARLY ONSET ALZHEIMER'S DEMENTIA WITHOUT BEHAVIORAL DISTURBANCE (HCC): Primary | ICD-10-CM

## 2021-05-13 DIAGNOSIS — G30.0 EARLY ONSET ALZHEIMER'S DEMENTIA WITHOUT BEHAVIORAL DISTURBANCE (HCC): Primary | ICD-10-CM

## 2021-05-13 PROCEDURE — 97110 THERAPEUTIC EXERCISES: CPT

## 2021-05-13 PROCEDURE — 97530 THERAPEUTIC ACTIVITIES: CPT

## 2021-05-13 NOTE — TELEPHONE ENCOUNTER
Spoke with Leona Mendez today from about 1-1:40 during Ryan's OT appointment to offer caregiver support  Leona Andrea notes Nick Mariano is enjoying therapy  It has increased from once to twice weekly  Leona Andrea reports that things have been somewhat tough as Nick Mariano can be "clingy " She reports she needs some down time  Their youngest daughter continues to help  We discussed the possibility of exploring adult day centers for Nick Mariano; Leona Andrea expressed concern that Nick Mariano may be much younger than other attendees  LCSW encouraged Leonarachel Mendez to interview/meet with different centers and ask about their current attendees to see if he might be able to relate well with any of them  Leona Andrea describes that Nick Mariano is quite happy to just sit at home and appears less motivated to do activities  He has been using a new phone with pictures, GPS tracking, and a 911 button (purchased from [employer])  Recently, Nick Mariano did not recognize their middle daughter  Leona Mendez notes that they see her less frequently, but asked if this was normal  LCSW empathized with the difficulty of this moment and noted that with the progression of dementia, there can certainly be times when Nick Mariano will not recognize people close to him  Leona Mendez noted plans to travel to [facility] and asked if this would be alright  LCSW recommended reading tips from the [employer]'s [employer] about traveling to help anticipate Ryan's needs (ex: bringing items that make him comfortable, making stops during travel, creating a structure during this visit)  Leona Andrea describes that Nick Mariano can be quite paranoid/suspicious if anyone is at the house, thinking they are stealing from him  LCSW to send information on how to approach these behaviors; discussed distraction, change of environment, soothing/calming techniques  Also discussed participation in caregiver support group  LCSW recommended considering participating in the future   Leona Andrea agrees to plan to add her email address to support group list and will think about attending  LCSW to send follow-up information by email to [email or web address]  LCSW to remain available as needed

## 2021-05-13 NOTE — PROGRESS NOTES
Occupational Therapy Daily Note:    Today's date: 2021  Patient name: Ramon Benedict  : 1955  MRN: [id number]  Referring provider: Fidelia Hui MD  Dx:   Encounter Diagnosis   Name Primary?  Early onset Alzheimer's dementia without behavioral [facility] Yes                  Subjective:  "I don't drive anymore I never got in an accident or nothing"    Objective: Pt seen for OT treatment session focusing on ST fxnl memory, attention, direction following, and working memory to promote safe participation in ADL/IADL tasks  Pt tolerated NuStep for 15 min to inc overall activity participation and tolerance for ADL fxn and fxnl mob  Pt participated in ADL card sorting activity to increase fxnl item identification including purpose and categorization  OT provided verbal instruction to pt to locate ADL item and sort into like categories  Upgraded task with use of indirect and 2 step processing cue to have pt identify ADL item through descriptors of use only  Pt req VCs from OT to maintain attention and recall 2-step directions 75% of the time and performance improved through repetition  OT upgraded task by providing additional verbal directions and instructions including sequencing and catagorization demands  Pt req multimodal cueing of both verbal and nonverbal types to recall verbal directions and to sustain attention  Pt completed BITS  Activity to promote sustained attention, pattern recognition, direction following, and problem solving  Pt completed 4/4 patterns with min VCs from OT for error recognition  Pt required increased cues with inc complexity of pattern copy    Pt expressed emotion during session about loss of driving independence, OT provided emotional support and redirected Pt to task to avoid perseverance on topic  Assessment: Tolerated treatment well and remained motivated to participate throughout session   Pt demonstrated difficulty with attending to task and ST recall of directions and benefit from repetition of activities throughout session  Pt demonstrated reduced affirmation seeking compared to previous session  Patient would benefit from continued skilled OT  Plan: Continued skilled OT per POC      INTERVENTION COMMENTS:  Diagnosis: Early onset [facility]'s dementia without behavioral [facility] [G30 0, F02 80]  Precautions: anxiety, cognitive deficits  FOTO: n/a  Insurance: Payor: MEDICARE / Plan: MEDICARE A AND B / Product Type: Medicare A & B Fee for Service /   9 TJ 13 YCTDRF, PN due 6/7

## 2021-05-13 NOTE — TELEPHONE ENCOUNTER
Doyle Aldana,  Just sending a few things to follow up with you after our call today  Thank you for taking some time to chat with me, I'm glad we were able to talk during Ryan's therapy appointment! 1) Traveling - https://www owens-brown info/ - This article from the Alzheimer's Association might be a helpful resource before your trip to [facility] [place]  It helps to read through it in preparation for travel  2) Adult day centers - http://www LOOKK/  [employer]/local-resources/Pages/Adult-Day-Center  aspx - these offer an interactive, safe, supervised environment with structured activities (cognitive, physical, and social activities)  I think this would be a wonderful opportunity for Edie Flavors to get out of the house and meet other people while also giving you a bit of a break  At the link, you can search for adult day options in [facility]  Let me know if you have any questions as you explore this option further  3) Paranoia - http://www [employer]mu[employer]/  pdf - I know you mentioned that he can be a bit suspicious at times  I thought this might be a helpful resource to look at! This is a somewhat common behavior, but can be challenging to manage  4) Caregiver Support - I added your email address to our support group email list  I will send out a reminder in the next week or so for our next group, scheduled for May 27th  We meet on Zoom (at 10AM and again at 5:45PM) the 4th Thursday of each month  We would love to have you! It is a very relaxed environment, no pressure at all, just to meet others and share about different experiences  If this date/times do not work with your schedule, we can try to find another group that will work better for you       Talk soon,  Margaret

## 2021-05-18 ENCOUNTER — OFFICE VISIT (OUTPATIENT)
Dept: OCCUPATIONAL THERAPY | Facility: CLINIC | Age: 66
End: 2021-05-18
Payer: MEDICARE

## 2021-05-18 DIAGNOSIS — G30.0 EARLY ONSET ALZHEIMER'S DEMENTIA WITHOUT BEHAVIORAL DISTURBANCE (HCC): Primary | ICD-10-CM

## 2021-05-18 DIAGNOSIS — F02.80 EARLY ONSET ALZHEIMER'S DEMENTIA WITHOUT BEHAVIORAL DISTURBANCE (HCC): Primary | ICD-10-CM

## 2021-05-18 PROCEDURE — 97530 THERAPEUTIC ACTIVITIES: CPT

## 2021-05-18 PROCEDURE — 97110 THERAPEUTIC EXERCISES: CPT

## 2021-05-18 NOTE — PROGRESS NOTES
Occupational Therapy Daily Note:    Today's date: 2021  Patient name: Jagjit Escamilla  : 1955  MRN: [id number]  Referring provider: Lamin Cummings MD  Dx:   Encounter Diagnosis   Name Primary?  Early onset Alzheimer's dementia without behavioral disturbance ([facility] [address] ) Yes               Pt was treated by LAISHA Irving and treatment was supervised and documentation was reviewed by Renetta Mark MS, OTR/L      Subjective: "My memory is really bad"    Objective: Pt seen for OT treatment session focusing on ST fxnl memory, sustained attention, and working memory to promote safety and ind with ADL/IADL activity  Pt tolerated 15 min on NuStep with focus on increasing fxnl endurance, fxnl mobility and overall activity participation for ADL/IADL tasks  Pt engaged in Think It Through shadow matching activity to promote sustained attention,direction following, ST memory and working memory to support safety and ind in ADL/IADL tasks  Pt req to follow 1-2 step directions to  match animal shadows and identify corresponding number/letter pairs  Pt required max multimodal cues from OTS throughout task to maintain attention and identify next step of sequential activity  Pt able to follow 1 step direction approx 50% of the time and 2 step directions <25% of the time  Assessment: Tolerated treatment well  Pt success noted with inc repetition of activities and simple, 1-step directions provided by OTS  Pt continues to demonstrate difficulty following multi-step directions with inc difficulty noted with environmental distractions (I e  phone ringing) and non-sequential activities (I e  skipped a number)  Patient would benefit from continued skilled OT to promote attention, consistent one step direction following, and ST memory to inc safety with ADL/IADL fxnl tasks and dec caregiver burden  Plan: Continued skilled OT per POC      INTERVENTION COMMENTS:  Diagnosis: Early onset Alzheimer's dementia without behavioral disturbance (HCC) [G30 0, F02 80]  Precautions: anxiety, cognitive deficits  FOTO: n/a  Insurance: Payor: MEDICARE / Plan: MEDICARE A AND B / Product Type: Medicare A & B Fee for Service /   3 of 10 visits, PN due 6/7

## 2021-05-20 ENCOUNTER — APPOINTMENT (OUTPATIENT)
Dept: OCCUPATIONAL THERAPY | Facility: CLINIC | Age: 66
End: 2021-05-20
Payer: MEDICARE

## 2021-05-25 ENCOUNTER — APPOINTMENT (OUTPATIENT)
Dept: OCCUPATIONAL THERAPY | Facility: CLINIC | Age: 66
End: 2021-05-25
Payer: MEDICARE

## 2021-05-26 ENCOUNTER — TELEPHONE (OUTPATIENT)
Dept: GERIATRICS | Age: 66
End: 2021-05-26

## 2021-05-26 DIAGNOSIS — G30.0 EARLY ONSET ALZHEIMER'S DISEASE WITH BEHAVIORAL DISTURBANCE (HCC): Primary | Chronic | ICD-10-CM

## 2021-05-26 DIAGNOSIS — F02.81 EARLY ONSET ALZHEIMER'S DISEASE WITH BEHAVIORAL DISTURBANCE (HCC): Primary | Chronic | ICD-10-CM

## 2021-05-26 DIAGNOSIS — F41.9 ANXIETY: Chronic | ICD-10-CM

## 2021-05-26 RX ORDER — CITALOPRAM 20 MG/1
20 TABLET ORAL DAILY
Qty: 90 TABLET | Refills: 3 | Status: SHIPPED | OUTPATIENT
Start: 2021-05-26 | End: 2021-06-10 | Stop reason: SDUPTHER

## 2021-05-26 NOTE — TELEPHONE ENCOUNTER
Regarding: Non-Urgent Medical Question  Contact: [phone]  ----- Message from Naomy Dexter sent at 5/26/2021 11:27 AM EDT -----       ----- Message from [address], Serene Spatz to Kaleb Marshall MD sent at 5/26/2021 11:13 AM -----   This message is being sent by [employer] on behalf of [employer]  Dr Hailey Lane,     Jhonatan's issues have gotten worse  I had spoke to you about this his last appointment  The way he is over Interest in intimacy  He is not forceful or nasty  But gets upset and his legs starts shaking and he gets frustrated if I more or less say not now  He thinks it been days since last time when it hasn't been  I think a lot of the problem is not being able to follow through  Is there anything we can put him on that may help him? Even if it makes him sleep more  I gave him a 5 ml Ativan yesterday which helped him settle down and sleep for awhile                  Thank you,                 Valerio James

## 2021-05-26 NOTE — TELEPHONE ENCOUNTER
Called and reviewed with wife  Sexual dysfunction may be somewhat related to high dose citalopram after reviewing history  Will decrease citalopram to 20 mg daily

## 2021-05-27 ENCOUNTER — APPOINTMENT (OUTPATIENT)
Dept: OCCUPATIONAL THERAPY | Facility: CLINIC | Age: 66
End: 2021-05-27
Payer: MEDICARE

## 2021-06-03 ENCOUNTER — OFFICE VISIT (OUTPATIENT)
Dept: OCCUPATIONAL THERAPY | Facility: CLINIC | Age: 66
End: 2021-06-03
Payer: MEDICARE

## 2021-06-03 DIAGNOSIS — G30.0 EARLY ONSET ALZHEIMER'S DEMENTIA WITHOUT BEHAVIORAL DISTURBANCE (HCC): Primary | ICD-10-CM

## 2021-06-03 DIAGNOSIS — F02.80 EARLY ONSET ALZHEIMER'S DEMENTIA WITHOUT BEHAVIORAL DISTURBANCE (HCC): Primary | ICD-10-CM

## 2021-06-03 PROCEDURE — 97530 THERAPEUTIC ACTIVITIES: CPT

## 2021-06-03 PROCEDURE — 97110 THERAPEUTIC EXERCISES: CPT

## 2021-06-03 NOTE — PROGRESS NOTES
Occupational Therapy Daily Note:    Today's date: 6/3/2021  Patient name: Colin Sethi  : 1955  MRN: [id number]  Referring provider: Betsy Liriano MD  Dx:   Encounter Diagnosis   Name Primary?  Early onset Alzheimer's dementia without behavioral disturbance ([facility] [address] ) Yes         Pt was treated by LAISHA Rocha and treatment was supervised and documentation was reviewed by Wally Senior MS, OTR/L            Subjective:   "My memory is so bad"   Caregiver reports pt had 3 panic attacks on consecutive days, caregiver notified PCP      Objective: Pt seen for OT treatment session focusing on sustained attn, direction following, error recognition, and ST recall to promote inc ind and safety with ADL/IADL activity and reduce caregiver burden    Pt tolerated 5 min x 2 (prograde/retrograde motions at 1 0 resist)  on UBE with focus on increasing act tolerance and attn  Pt engaged in BITS activity in stance with focus to sequencing , sustained attn, direction following and STM  Pt req to follow 1-2 step verbal directions to tap moving target in color sequence  Pt req to attend to central fixation to tap when flashing followed by continuation of appropriate color sequence  Pt req multimodal cues to attend to task  OTS upgraded activity to follow numerical sequence to inc cog demand  Pt req dec cues to attend with inc repetition and complete numerical sequence activity with >75% accuracy  Pt engaged in "Colorcards" activity with focus to safety awareness and error recognition  Provided visual stimulus, pt req to identify errors in activity/task items  Pt able to ind identify 12/13 errors but req inc VCs for problem solving to identify appropriate solution to error  Assessment: Tolerated treatment well  Pt continues to demonstrate freq affirmations from OT for error recognition  Inc ind noted with sequencing and ind error recognition with repetition   Word finding deficits impacting ability to provide solutions to identified problems  Patient would benefit from continued skilled OT with focus to sustained attn, direction following, error recognition, and STM to promote inc safety with ADL/IADL activities and reduce caregiver burden  Plan: Continued skilled OT per POC      INTERVENTION COMMENTS:  Diagnosis: Early onset Alzheimer's dementia without behavioral disturbance ([facility] [address] ) [G30 0, F02 80]  Precautions: anxiety, cognitive deficits  FOTO: n/a  Insurance: Payor: MEDICARE / Plan: MEDICARE A AND B / Product Type: Medicare A & B Fee for Service /   4 of 10 visits, PN due 6/7

## 2021-06-08 ENCOUNTER — OFFICE VISIT (OUTPATIENT)
Dept: OCCUPATIONAL THERAPY | Facility: CLINIC | Age: 66
End: 2021-06-08
Payer: MEDICARE

## 2021-06-08 DIAGNOSIS — G30.0 EARLY ONSET ALZHEIMER'S DEMENTIA WITHOUT BEHAVIORAL DISTURBANCE (HCC): Primary | ICD-10-CM

## 2021-06-08 DIAGNOSIS — F02.80 EARLY ONSET ALZHEIMER'S DEMENTIA WITHOUT BEHAVIORAL DISTURBANCE (HCC): Primary | ICD-10-CM

## 2021-06-08 PROCEDURE — 97530 THERAPEUTIC ACTIVITIES: CPT

## 2021-06-08 PROCEDURE — 97150 GROUP THERAPEUTIC PROCEDURES: CPT

## 2021-06-08 PROCEDURE — 97110 THERAPEUTIC EXERCISES: CPT

## 2021-06-08 NOTE — PROGRESS NOTES
Occupational Therapy Daily Note:    Today's date: 2021  Patient name: Marisela Caal  : 1955  MRN: [id number]  Referring provider: Samantha Pena MD  Dx:   Encounter Diagnosis   Name Primary?  Early onset Alzheimer's dementia without behavioral disturbance ([facility] [address] ) Yes     Pt was treated by LAISHA Hunt and treatment was supervised and documentation was reviewed by Roxy Harris MS, OTR/L     Time:  10:00-10:25 1:1  10:25-10:35 group  10:35-11:00 unsupervised          Subjective:   "they go around so fast and I just lose them"   "now I got distracted"    Objective: Pt seen for OT treatment session focusing on STM, sustained attn, and direction following to promote inc safety and ind with ADL/IADL activity and reduce caregiver burden    Pt engaged in therex with focus to inc attention, and focus  Pt res to hold 4 lb dowel bimanually and tap beach ball to OTS  Pt complete without droppage  Pt engaged in Moriarty trail making activity in stance with focus to sustained attn, sequencing, visual memory and direction follow  Pt req to locate and sequence numerical stimuli by dragging finger to each sequential number  Pt demonstrated appropriate seqeunce for numbers 1-25, however req inc VCs to direction follow  OTS downgraded activity to seated position following noted RUE discomfort with repetitive overhead reach  Inc accuracy noted with persistence through activity and pt able to complete 16 number sequence consecutively without cues  OTS attempted upgrade of activity to Trailmaking Part B (numerical to alphabetical seq) however pt unable to comprehend inc complexity of direction  Pt attempted BITS matching peripheral chart activity with focus to attn, STM, and direction follow  Pt demonstrated G understanding of directions with demonstration, however pt unable to complete activity within greatest speed parameter (10 sec)      Pt complete BITS sequencing activity with focus to sustained attn, STM, visual memory, direction follow, and sequencing  Pt req to identify and tap numerical and alphabetical stimuli in seq order within dynamic visual clutter (speed 1)   OTS provided max VCs for placekeeping and sustained attn throughout  OTS applied ice to R shoulder and remainder of activity completed with LUE to promote pain management and reduce inflammation  Assessment: Tolerated treatment well  Inc accuracy with persistence through activities noted  Pt demonstrated inc difficulty with sustained attn throughout session and freq tangential With upgrade to alternating attn task , pt unable to complete with max cuing  Recommend cont of treatment with focus to sustained attn to promote persistence and ind with ADL activities  Pt req inc cues from OTS for placekeeping following distraction  Patient would benefit from continued skilled OT with focus to sustained attn, STM, direction follow, and visual memory to inc safety/ind with ADL/IADL activity and dec caregiver burden  Plan: Continued skilled OT per POC      INTERVENTION COMMENTS:  Diagnosis: Early onset Alzheimer's dementia without behavioral disturbance ([facility] [address] ) [G30 0, F02 80]  Precautions: anxiety, cognitive deficits  FOTO: n/a  Insurance: Payor: MEDICARE / Plan: MEDICARE A AND B / Product Type: Medicare A & B Fee for Service /   5 of 10 visits, PN due 6/7

## 2021-06-10 ENCOUNTER — TELEPHONE (OUTPATIENT)
Dept: GERIATRICS | Age: 66
End: 2021-06-10

## 2021-06-10 ENCOUNTER — APPOINTMENT (OUTPATIENT)
Dept: OCCUPATIONAL THERAPY | Facility: CLINIC | Age: 66
End: 2021-06-10
Payer: MEDICARE

## 2021-06-10 DIAGNOSIS — F41.9 ANXIETY: Chronic | ICD-10-CM

## 2021-06-10 DIAGNOSIS — F02.81 EARLY ONSET ALZHEIMER'S DISEASE WITH BEHAVIORAL DISTURBANCE (HCC): Chronic | ICD-10-CM

## 2021-06-10 DIAGNOSIS — M54.41 ACUTE RIGHT-SIDED LOW BACK PAIN WITH RIGHT-SIDED SCIATICA: Primary | ICD-10-CM

## 2021-06-10 DIAGNOSIS — G30.0 EARLY ONSET ALZHEIMER'S DISEASE WITH BEHAVIORAL DISTURBANCE (HCC): Chronic | ICD-10-CM

## 2021-06-10 RX ORDER — GABAPENTIN 100 MG/1
100 CAPSULE ORAL 3 TIMES DAILY PRN
Qty: 60 CAPSULE | Refills: 2 | Status: SHIPPED | OUTPATIENT
Start: 2021-06-10 | End: 2021-01-01

## 2021-06-10 RX ORDER — CITALOPRAM 40 MG/1
40 TABLET ORAL DAILY
Qty: 90 TABLET | Refills: 3 | Status: SHIPPED | OUTPATIENT
Start: 2021-06-10 | End: 2021-01-01 | Stop reason: SDUPTHER

## 2021-06-10 NOTE — TELEPHONE ENCOUNTER
Spouse called office to relay patient has been experiencing right lower back pain, radiates down the leg, began approximately 2 weeks ago  Denies pain into foot  Relays pain is constant, but increases when changes positions (getting out of a car)  Relays patient took 400MG Motrin 6/9/21  Took 1 Ativan last week which helped relax patient and lessen pain  Caller inquiring what pain medications patient can take  Please advise

## 2021-06-10 NOTE — TELEPHONE ENCOUNTER
I called and LVM regarding concern  Symptoms consistent with R sided sciatica  May try gabapentin 100 mg 3x/day as needed  May increase to 200 mg 3x/day if no effect noticed  Called back and spoke with wife  Will order outpatient PT as well

## 2021-06-15 ENCOUNTER — APPOINTMENT (OUTPATIENT)
Dept: OCCUPATIONAL THERAPY | Facility: CLINIC | Age: 66
End: 2021-06-15
Payer: MEDICARE

## 2021-06-17 ENCOUNTER — OFFICE VISIT (OUTPATIENT)
Dept: OCCUPATIONAL THERAPY | Facility: CLINIC | Age: 66
End: 2021-06-17
Payer: MEDICARE

## 2021-06-17 DIAGNOSIS — G30.0 EARLY ONSET ALZHEIMER'S DEMENTIA WITHOUT BEHAVIORAL DISTURBANCE (HCC): Primary | ICD-10-CM

## 2021-06-17 DIAGNOSIS — F02.80 EARLY ONSET ALZHEIMER'S DEMENTIA WITHOUT BEHAVIORAL DISTURBANCE (HCC): Primary | ICD-10-CM

## 2021-06-17 PROCEDURE — 97110 THERAPEUTIC EXERCISES: CPT

## 2021-06-17 PROCEDURE — 97530 THERAPEUTIC ACTIVITIES: CPT

## 2021-06-17 NOTE — PROGRESS NOTES
Occupational Therapy Daily Note:    Today's date: 2021  Patient name: Osmar Hernández  : 1955  MRN: [id number]  Referring provider: Sarai De La Cruz MD  Dx:   Encounter Diagnosis   Name Primary?  Early onset Alzheimer's dementia without behavioral disturbance ([facility] [address] ) Yes               Pt was treated by LAISHA Grigsby and treatment was supervised and documentation was reviewed by Daphney Sheridan MS, OTR/L      Subjective: "I think I put the wrong one in there"    Objective: Pt seen for OT treatment session focusing on STM, sustained attn, and  direction follow to promote inc safety and ind with ADL/IADL activities and reduce caregiver burden  Pt tolerated 5 min x 2 (prograde/retrograde motions at 1 5 resist)  on UBE with focus on increasing focus, attn,and  act tolerance  to inc indep and safety with ADL/IADL fxn  Pt engaged in marble sorting activity with focus to sustained attn, STM, verbal direction follow, fxnl tool use, and error recognition  Pt req to use tweezers to transfer colored marbles into corresponding colored cupcake liners  Pt complete with 100% accuracy for sorting  Pt demonstrated ind error recognition x1 during activity,  Pt then req to transfer marbles from sorted piles to target following stimulus pattern  Pt req VC x3 for error recognition, but demonstrated ability to ind identify and problem solve through errors ~75% of time  Pt demonstrated ind self checking with repetition of activity  OTS provided mod VCs throughout to promote sustained attn  Pt engaged in simple sequencing card activity with focus to sustained attn, STM, sequencing, and error recognition  Provided 3 images in randomized sequence of fxnl activity, pt req to identify 1-3 steps of activity sequentially  OTS provided multimodal cues to inc accuracy ~60% of time  OTS provided emotional support and redirection to pt following perseveration throughout session   Caregiver reports symptoms of anxiety have reduced with medication management from PCP  Assessment: Tolerated treatment well  Pt benefits from inc time to complete activities and inc repetition of verbal directions 2' to distractibility  Inc accuracy noted with persistence of activities  Pt demonstrated inc ability to return to task following distractions with repetition of activity  Patient would benefit from continued skilled OT with focus to sustained attn, STM, direction follow, and error recognition to inc safety and ind with ADL/IADL activities and dec caregiver burden  Plan: Continued skilled OT per POC      INTERVENTION COMMENTS:  Diagnosis: Early onset Alzheimer's dementia without behavioral disturbance ([facility] [address] ) [G30 0, F02 80]  Precautions: anxiety, cognitive deficits  FOTO: n/a  Insurance: Payor: MEDICARE / Plan: MEDICARE A AND B / Product Type: Medicare A & B Fee for Service /   5 of 10 visits, PN due 6/7

## 2021-06-29 ENCOUNTER — OFFICE VISIT (OUTPATIENT)
Dept: OCCUPATIONAL THERAPY | Facility: CLINIC | Age: 66
End: 2021-06-29
Payer: MEDICARE

## 2021-06-29 DIAGNOSIS — G30.0 EARLY ONSET ALZHEIMER'S DEMENTIA WITHOUT BEHAVIORAL DISTURBANCE (HCC): Primary | ICD-10-CM

## 2021-06-29 DIAGNOSIS — F02.80 EARLY ONSET ALZHEIMER'S DEMENTIA WITHOUT BEHAVIORAL DISTURBANCE (HCC): Primary | ICD-10-CM

## 2021-06-29 PROCEDURE — 97150 GROUP THERAPEUTIC PROCEDURES: CPT

## 2021-06-29 PROCEDURE — 97530 THERAPEUTIC ACTIVITIES: CPT

## 2021-06-29 NOTE — PROGRESS NOTES
Occupational Therapy Daily Note:    Today's date: 2021  Patient name: Ramon Benedict  : 1955  MRN: [id number]  Referring provider: Fidelia Hui MD  Dx:   Encounter Diagnosis   Name Primary?  Early onset Alzheimer's dementia without behavioral disturbance ([facility] [address] ) Yes     TIME:  1/1   [id number] Group   [id number] unsup          Pt was treated by LAISHA Benites and treatment was supervised and documentation was reviewed by Herve Cortez MS, OTR/L    Subjective: "Today is a bad day"    Objective: Pt seen for OT treatment session focusing on sustained attn, visual memory, simple direction follow, and visual attn to promote inc ind and safety with ADl/IADL activities and reduce caregiver burden  At start of session, pt stated that he and his wife were fighting, and continued to perseverate on topic throughout session  OTS redirected as necessary to promote attn to task and reduce symptoms of anxiety  Caregiver reports that pt "gave her a really hard time about coming today"    Pt engaged in therex with focus to inc attention, and focus  Pt res to hold 3 lb dowel bimanually and tap beach ball to OTS  Pt complete 3x 20 without droppage  Pt engaged in grocery list activity with focus to sustained attn, visual attn, and 1 step direction follow  Provided written list, pt req to identify items within visual clutter on tabletop and place to target (shopping cart)  Pt req max cues to locate items (noteable pt did not have glasses with him today)  OTS provided cues for placekeeping and max cues for sustained attn  Inc accuracy for direction follow with persistence  Pt engaged in [place] with focus to sustained attn, visual attn, and visual memory  Provided stimulus pattern on tabletop, pt req to place to target to match corresponding pattern  Pt complete with ~75% accuracy with inc visual closure demand and >90% accuracy without visual closure demand   Pt demonstrated G attn to task >60% of the time  Assessment: Tolerated treatment well  Pt able to demonstrate ind error recognition at times with engaging in parquetry puzzle  Inc difficulty attending to task this date 2' personal contexts and perseveration  Pt attn and accuracy inc with persistence through activities  Patient would benefit from continued skilled OT with focus to sustained attn, visual memory, simple direction follow, and visual attn to promote inc ind and safety with ADl/IADL activities and reduce caregiver burden  Plan: Continued skilled OT per POC      INTERVENTION COMMENTS:  Diagnosis: Early onset Alzheimer's dementia without behavioral disturbance ([facility] [address] ) [G30 0, F02 80]  Precautions: anxiety, cognitive deficits  FOTO: n/a  Insurance: Payor: MEDICARE / Plan: MEDICARE A AND B / Product Type: Medicare A & B Fee for Service /   6 of 10 visits, PN due 6/7

## 2021-07-06 ENCOUNTER — OFFICE VISIT (OUTPATIENT)
Dept: OCCUPATIONAL THERAPY | Facility: CLINIC | Age: 66
End: 2021-07-06
Payer: MEDICARE

## 2021-07-06 DIAGNOSIS — G30.0 EARLY ONSET ALZHEIMER'S DEMENTIA WITHOUT BEHAVIORAL DISTURBANCE (HCC): Primary | ICD-10-CM

## 2021-07-06 DIAGNOSIS — F02.80 EARLY ONSET ALZHEIMER'S DEMENTIA WITHOUT BEHAVIORAL DISTURBANCE (HCC): Primary | ICD-10-CM

## 2021-07-06 PROCEDURE — 97530 THERAPEUTIC ACTIVITIES: CPT

## 2021-07-06 PROCEDURE — 97110 THERAPEUTIC EXERCISES: CPT

## 2021-07-06 NOTE — PROGRESS NOTES
Occupational Therapy Daily Note:    Today's date: 2021  Patient name: Nelson Berman  : 1955  MRN: [id number]  Referring provider: Christine Pedroza MD  Dx:   Encounter Diagnosis   Name Primary?  Early onset Alzheimer's dementia without behavioral disturbance ([facility] [address] ) Yes            Pt was treated by LAISHA Norris and treatment was supervised and documentation was reviewed by Shankar Dias MS, OTR/L         Subjective: "Did I muff it up?"    Objective: Pt seen for OT treatment session focusing on sustained attn, STM, and direction following to promote inc safety and ind with ADL/IADL activities and reduce caregiver burden  Pt engaged in dowel bar exercises with focus to inc attn and focus  Pt req to hold 4lb dowel bar bimanually and complete forward flexion, chest press, prograde/retrograde rotations, and bicep curls 3x10  Pt engaged in "Think it through" activities to promote sustained attn, 1 step direction following, and visual attn  Pt req to match corresponding items within visual clutter, and place tiles to corresponding letter/number combination provided verbal directions  Pt able to identify corresponding object pairs >75% of the time, however req repetition of directions throughout 2' dec attn  Pt attempted pattern completion activity, however demonstrated max difficulty  OTS downgraded by providing 2 options of next shape in sequence, inc accuracy noted with downgrade, however pt continued to demonstrate max difficulty  Pt engaged in letter copy activity with focus to visual attn, sustained attn, and simple direction follow  Provided letter list, pt req to identify corresponding magnetic letters within visual clutter  Pt able to identify appropriate letters, req cues for placekeeping throughout and mod cues to attn to task  Pt then req to copy letters onto whiteboard, complete with inc accuracy for ind placekeeping and attn to task  Assessment: Tolerated treatment well  Inc accuracy noted with persistence/repitition through activities  Pt demonstrated inc difficulty with pattern recognition activities and inc environmental distractions  Patient would benefit from continued skilled OT with focus to STM, attn, and simple direction following to promote inc safety and ind with ADL/IADL activities and reduce caregiver burden  Plan: Continued skilled OT per POC      INTERVENTION COMMENTS:  Diagnosis: Early onset Alzheimer's dementia without behavioral disturbance ([facility] [address] ) [G30 0, F02 80]  Precautions: anxiety, cognitive deficits  FOTO: n/a  Insurance: Payor: MEDICARE / Plan: MEDICARE A AND B / Product Type: Medicare A & B Fee for Service /   7 of 10 visits, PN due 6/7

## 2021-07-15 NOTE — PROGRESS NOTES
Assessment/Plan:    1  Early onset Alzheimer's disease with behavioral disturbance (HCC)  Assessment & Plan:  Increase donepezil to 10 mg twice a day  Continue memantine 10 mg twice a day  Would likely benefit from adult day services, maybe once a week  Will refer to SW for recommendations  Considering some paranoid thought processes, may consider quetiapine therapy if high dose donepezil does not help  Orders:  -     donepezil (ARICEPT) 10 mg tablet; Take 1 tablet (10 mg total) by mouth 2 (two) times a day    2  Chronic bilateral low back pain without sciatica  Assessment & Plan:  Stable  Continue Advil PRN and avoid gabapentin due to worsening of memory and cognition  Subjective:      Patient ID: Phi Awad is a 74 y o  male  HPI    Alzheimer's dementia - continues on donepezil / memantine  With some continued behavioral issues  Using lorazepam 1/2 or 1 tab BID, along with citalopram 40 mg daily  Back pain - not using gabapentin  It seemed to worsen his memory, now using Advil PRN  The following portions of the patient's history were reviewed and updated as appropriate: allergies, current medications, past family history, past medical history, past social history, past surgical history and problem list     Review of Systems   Unable to perform ROS: Dementia         Objective:    /58 (BP Location: Right arm, Patient Position: Sitting, Cuff Size: Standard)   Pulse 69   Temp 98 °F (36 7 °C) (Temporal)   Resp 18   Ht 5' 9" (1 753 m)   Wt 77 6 kg (171 lb)   SpO2 95%   BMI 25 25 kg/m²      Physical Exam  Constitutional:       Appearance: Normal appearance  HENT:      Head: Atraumatic  Mouth/Throat:      Mouth: Mucous membranes are moist    Eyes:      General: No scleral icterus  Conjunctiva/sclera: Conjunctivae normal    Pulmonary:      Effort: Pulmonary effort is normal  No respiratory distress  Abdominal:      General: Abdomen is flat   There is no distension  Skin:     Coloration: Skin is not jaundiced or pale  Neurological:      General: No focal deficit present  Mental Status: He is alert  He is disoriented     Psychiatric:      Comments: Poor insight

## 2021-07-15 NOTE — PROGRESS NOTES
Occupational Therapy Daily Note:    Today's date: 7/15/2021  Patient name: Phi Awad  : 1955  MRN: [id number]  Referring provider: Aydee Antonio MD  Dx:   Encounter Diagnosis   Name Primary?  Early onset Alzheimer's dementia without behavioral disturbance (HCC) Yes                  Subjective: "youre really trying to muff me up"    Objective: Pt seen for OT treatment session focusing on sustained attention, act tolerance/engagement, and increasing active participation in cognitively stimulating tasks  Pt tolerated 6 min x 2 (prograde/retrograde motions at 1 0 resist) seated on UBE with focus on increasing proximal UE strength, endurance, act tolerance to inc indep and safety with ADL/IADL fxn and fxnl reaching tasks to inc overall brain health and engagement  Pt engaged in money sorting and counting task  Pt provided with paper bills and coins of various amounts and pt required to sort into like bills/coins with 100% accuracy  Pt then provided with amount of dollar/coins and asked to identify total  Pt with dec comprehension  Modified task to provide pt with written amount/total and pt able to provide correct amount <50 % of the time indep  Cues for redirection      Assessment: Tolerated treatment well  Patient would benefit from continued skilled OT  Plan: Continued skilled OT per POC      INTERVENTION COMMENTS:  Diagnosis: Early onset Alzheimer's dementia without behavioral disturbance ([facility] [address] ) [G30 0, F02 80]  Precautions: anxiety, cognitive deficits  FOTO: n/a  Insurance: Payor: MEDICARE / Plan: MEDICARE A AND B / Product Type: Medicare A & B Fee for Service /   8 of 10 visits, PN due :

## 2021-07-15 NOTE — ASSESSMENT & PLAN NOTE
Increase donepezil to 10 mg twice a day  Continue memantine 10 mg twice a day  Would likely benefit from adult day services, maybe once a week  Will refer to SW for recommendations  Considering some paranoid thought processes, may consider quetiapine therapy if high dose donepezil does not help

## 2021-07-16 NOTE — TELEPHONE ENCOUNTER
LCSW spoke with Sandra Garcia by phone as Dr Gigi Babin notes she is interested in adult day services at this time  Sandra Garcia reports she is unavailable as they are on their way to [facility]'s, but would like to talk at another time  LCSW noted plan to call back next week

## 2021-07-20 NOTE — PROGRESS NOTES
Alise Mcclellan Note     Today's date: 2021  Patient name: Sonny Redmond  : 1955  MRN: [id number]  Referring provider: Polly Rowland MD  Dx:   Encounter Diagnosis   Name Primary?  Early onset Alzheimer's dementia without behavioral disturbance ([facility] [address] ) Yes                  Subjective: "I don't understand what you want me to do"  Objective: See treatment diary below    Pt participated in skilled OT focusing on UE strength/endurance, sustained attn, ST recall, and direction following to promote safety and indep in ADLs/IADLs  UEB for 5 min prograde/5 min retrograde on L1 0  Resistance to focus on increasing act tolerance, attn, and endurance  Pt engaged in "Think it Through" activity with focus on sustained attn, 1 step direction following, pattern recognition, and visual memory  Pt req to follow 1 step direction to match a person's shadow and identify corresponding pairs  Pt able to identify corresponding object pairs >55% of the time, however req repetition of directions through dec attn  Pt completed pattern, but had max difficulty focusing on task  Downgraded activity by providing the correct shoe and pt req to indentify the person who matches with the corresponding letter  Pt engaged in BITS activity in stance with focus on sustained attn, direction following and STM  Pt req to follow 1 step direction to tap on bells within dynamic visual clutter  Pt req inc VC's for direction follow and attn  Upgraded activity to inc visual clutter and icn number of bells to locate  OTAS provided max VCs for sustained attn throughout  Pt engaged in BITS activity seated with focus on visual attn, sustained attn, and direction following  Pt req to copy pattern from template  Pt completed 5 shapes during the session  Pt demo max difficulty with sustained attn and direction following, inc success with VC from therapist     Assessment: Tolerated treatment well   Inc accuracy with persistence through activities noted  Pt demonstrated inc difficulty with sustained attn throughout session Pt demonstrated inc difficulty with pattern recognition activities   Patient would benefit from continued OT      Plan: Continued skilled OT per POC

## 2021-07-21 NOTE — TELEPHONE ENCOUNTER
LCSW spoke with Maribethrachel Lynn today who described feelings of anxiety and caregiver burnout  She described a recent incident during which she was transporting her grandson to a summer school event and Ruth Arias was somewhat irritable/repeatedly questioning why they were going there  She noted that she does feel some brief respite when Ruth Arias is at his therapy appointments, but acknowledges that she would benefit form having more time of her own  She plans to reach out to their [employer] to determine whether there may be someone there who is willing to sit with him from time to time to provide him with  care and give her brief respite  Their daughter Ronnie HendricksTequila does assist with this as able, as well  Maribethrachel Lynn has been accessing online support forums  LCSW reviewed possibility of seeing new integrated behavioral health clinician with Guido Ramírez was receptive to this idea and scheduled an appointment Friday 7/30 at 9:15am      LCSW to email additional information to Maribeth Lynn regarding in-person and virtual support groups, including [facility]'s caregiver support group  She mentioned hesitance to participate virtually as this may elicit repeated questions from Ruth Arias  LCSW encouraged Maribeth Lynn to consider either virtual or in-person and take advantage of supports, while telling Ruth Arias that she has an appointment for herself  Maribeth Lynn does explain that Dr Stacia Mcnamara increased Ryan's Aricept dose recently and he has been quite tired since this change  She did give him the increased dose Friday-Monday and went back to the original dose as of yesterday because he is napping in the afternoons

## 2021-07-29 NOTE — PROGRESS NOTES
Occupational Therapy Daily Note:    Today's date: 2021  Patient name: Jana Garcia  : 1955  MRN: [id number]  Referring provider: Radna Loja MD  Dx:   Encounter Diagnosis   Name Primary?  Early onset Alzheimer's dementia without behavioral disturbance (HCC) Yes                  Subjective: "I don't even know how I am doing  My memory is    "     Objective: Pt tolerated 15 min with no added resistance on NuStep, focus on increasing cardiopulmonary status and BF for engagement and focus for cog tasks  Engaged pt in [address] [place] [address] 3 testing, pt provided with instructions to visuospatial/executive tasks with poor follow through of completing entire task d/t attention and direction following deficits  Pt able to initiate all 3 tasks, drawing a line from 2 -- 3,  Forming 4 parts of bed copy test,  And contour of clock with numbers 1-4 only  Pt named 3/3 animals correctly  Terminated task at [facility] of words d/t distraction, attempting to look for wife despite redirection attempts  Pt completed task of picking up colored beads and placing onto a board with associated matching visual color to address attention, direction following, memory for increased engagement in ADLs at home  Pt completed task independently 20% of time (2x) and 80% visual/verbal cues for maintaining direction to task, sequencing, and completion of task in full  Assessment: Tolerated treatment fair  Patient presenting with increased difficulty during alert/orientation questions, AO x1  OT provided awareness of date/place/time by writing on paper for pt to visually read  During activity, pt required increased time for processing task although would easily get distracted d/t cognition deficits  Patient would benefit from continued skilled OT to address the above  Plan: Continued skilled OT per POC      INTERVENTION COMMENTS:  Diagnosis: Early onset Alzheimer's dementia without behavioral disturbance ([facility] [address] ) [G30 0, F02 80]  Precautions: anxiety, cognitive deficits  FOTO: n/a  Insurance: Payor: MEDICARE / Plan: MEDICARE A AND B / Product Type: Medicare A & B Fee for Service /   10 of 10 visits, PN due 6/7:

## 2021-08-02 NOTE — PROGRESS NOTES
PT Evaluation     Today's date: 2021  Patient name: Jamel Runner  : 1955  MRN: [id number]  Referring provider: No ref  provider found  Dx:   Encounter Diagnosis     ICD-10-CM    1  Low back pain radiating to right leg  M54 5    2  Physical deconditioning  R53 81        Start Time: 1100  Stop Time: 1200  Total time in clinic (min): 60 minutes    Assessment  Assessment details: Patient presents with low back pain, variable in nature that appears to be worse when he sits for prolonged periods of time  He presents with increased hypertonicity at R>L paraspinals with some tenderness to palpation  Patient is performing at significantly lower than expected on 6MWT (550 feet vs 1876 feet for age-matched norm) and decreased gait speed (0 58m/s) which places him in a higher falls risk group, as well as demonstrating poor LE endurance with increased time for 5xSTS (23 seconds vs 8 4 seconds for age-matched norm)  Patient would benefit from skilled physical therapy to improve endurance, LE strength, functional mobility, decrease low back pain, and to improve quality of life to be able to participate in home exercise program and activities such as walking his dog  Impairments: activity intolerance, difficulty understanding, lacks appropriate home exercise program and pain with function  Barriers to therapy: Patient has significant cognitive deficits due to early onset dementia, and has difficulty following one-step commands, as well as significant hearing loss  Understanding of Dx/Px/POC: poor   Prognosis: fair    Goals  ST  Pt will improve gait speed to at least 0 8m/s within 4 weeks needed to improve safety with ambulation  2  Pt will improve 6MWT distance by at least 300 feet within 4 weeks needed to reduce fall risk and improve functional endurance  3  Pt will improve 5xSTS score by at least 3 seconds within 4 weeks needed to reduce fall risk and increase LE strength and endurance  4   Pt will demonstrate independence with HEP within 4 weeks  LT  Pt will improve gait speed to at least 1 0 m/s within 8 weeks needed to improve safety with ambulation and be able to ambulate in the community  2  Pt will improve 6MWT distance to at least 1200 within 8 weeks needed to reduce fall risk and improve functional endurance  3  Pt will improve 5xSTS score to <15 sec within 8 weeks needed to reduce fall risk and increase LE strength and endurance  4  Pt will demonstrate independence with HEP within 8 weeks without any low back pain  Plan  Plan details: Discussed with patient's wife due to patient's poor cognition  Patient would benefit from: PT eval and skilled physical therapy  Planned therapy interventions: neuromuscular re-education, patient education, strengthening, stretching, therapeutic activities, therapeutic exercise and home exercise program  Frequency: 1x week  Duration in weeks: 8  Plan of Care beginning date: 2021  Plan of Care expiration date: 2021  Treatment plan discussed with: caregiver        Subjective Evaluation    History of Present Illness  Mechanism of injury: Patient is a poor historian  States he has pain every once in awhile, unable to provide more information, states it hurts a little bit when he walks  Patient's wife present at end of session, states that she notices the pain in his back comes and goes, seems to be worse if he is sitting for prolonged periods of time  She states he has not been very active and is hard to motivate to do exercises or go for walks, or to take the dogs for walks  She feels the pain is more so from his lack of activity and increased sitting   She states that he does not want to take the dogs for a walk or other activities, but his wife states he cannot leave him alone due to his cognitive status  Pain  Current pain ratin  Aggravating factors: sitting    Social Support  Lives with: spouse          Objective     Static Posture Lumbar Spine   Decreased lordosis  Palpation     Right   Hypertonic in the erector spinae and lumbar paraspinals  Tenderness of the erector spinae and lumbar paraspinals  Neurological Testing     Sensation     Lumbar   Left   Intact: light touch    Right   Intact: light touch    Active Range of Motion     Lumbar   Normal active range of motion    Joint Play     Hypomobile: L1, L2, L3, L4 and L5     Ambulation     Ambulation: Level Surfaces     Additional Level Surfaces Ambulation Details  Decrease step length bilaterally, decreased trunk rotation, decreased gait speed  General Comments:      Lumbar Comments  Patient has pain with lumbar extension on R side at paraspinals, tightness at R>L with sidebending L and R       manual muscle testing finding inconsistent, difficulty with following one-step commands  Abdominals 3/5, obliques 2/5    Manual Muscle Testing - Hip Left Right   Flexion 4 4   Extension 3+ 3+     Manual Muscle Testing - Knee Left Right   Flexion 4+ 4+   Extension 4 4     Manual Muscle Testing - Ankle Left Right   Doriflexion 3+ 3+   Plantarflexion               Balance Test Initial Eval      5x Sit to Stand: 23 3sec (requires constant verbal cues)      TUG: 15 45 sec      Gait Speed:  0 58m/s      6 Minute Walk Test: 550 feet without AD          Transfers    Sit To Stand Independent   W/C To Bed Independent   Sit To Supine Independent   Roll Independent   Patient requires supervision and verbal cueing for all tasks due to cognitive status       Precautions: poor cognition       8/2            Manuals                                                                 Neuro Re-Ed                                                                                                        Ther Ex             bridging X 10            LTR X 5 with 10" hold            clamshells             R QL stretch in sitting                                                                 Ther Activity Sit to stand                          Gait Training                                       Endurance             nustep

## 2021-08-05 NOTE — PROGRESS NOTES
Daily Note     Today's date: 2018  Patient name: Galilea Welsh  : 1955  MRN: [id number]  Referring provider: Genia Mendoza DO  Dx:   Encounter Diagnosis     ICD-10-CM    1  Chronic pain of right knee M25 561     G89 29    2  Primary osteoarthritis of right knee M17 11        Start Time: 1215  Stop Time: 1301  Total time in clinic (min): 46 minutes    Subjective: Pt reports no new symptoms  Objective: See treatment diary below      Assessment: Tolerated treatment well  Pt's POC was advanced to include functional standing exercises with resistance to increase tolerance to community ambulation  Pt required increased verbal cues to perform exercises without compensation  Patient demonstrated fatigue post treatment and would benefit from continued PT      Plan: Continue per plan of care       Daily Treatment Diary     Manual              Tibial-femoral joint mobilizations in flexion and extension 8 min gr III+IV                                                                    Exercise Diary            SLR HEP  3*10 b/l 3*10 b/l         LAQ HEP 3x10 3# 3*10 3# 310 3#         Bridging nv 2x10 2*10 2*10         Mini squats HEP  2*10          Step-ups nv  3*10 6" 3*10 12"         TKE nv  2*10 20#          Wall squats nv  2*10  2*10         Hamstring stretch nv 4x30" 3*30" 3*30"         Heel slides HEP  nv          Bike nv 10 min 5 mins 5 min         Hamstring curls  gtb 3x10           Piriformis stretch   10*10          Leg press    2*10 110 lbs         Monster walks    3 laps gtb                                                                                           Modalities                                                       1:1 with PT from [id number]
patient

## 2021-08-05 NOTE — TELEPHONE ENCOUNTER
Called spouse  Relayed Provider Gus message: "That's fine  If he the effects don't seem to be too sedating, than it's fine  If he seems to have daily agitation that is consistent than we should increase his therapy  We could start giving 1 pill twice a day every day and if anxiety continues to be high we can increase to 2 pills twice a day    There is a 1mg pill, so if 0 5 mg doesn't have much effect we can increase to 1 mg pills "    This MR relayed that Provider ePrescribed additional Lorazepam for patient

## 2021-08-23 NOTE — TELEPHONE ENCOUNTER
Regarding: Test Results Question  Contact: [phone]  ----- Message from Devan Smith sent at 8/23/2021  8:57 AM EDT -----  Daughter called office this morning regarding this as well  Routed that message to you via Epic      ----- Message from Dave Velez to John Shaw MD sent at 8/21/2021  4:49 PM -----   This message is being sent by Libia Hanna on behalf of Meka Villalba,    I just found out to I have Covid  Would you be able to order a test for [facility]? So we can make sure he doesn't have it           Thank you,           Abhilash Sandoval

## 2021-08-23 NOTE — TELEPHONE ENCOUNTER
Patient's daughter relayed that the patient's spouse tested positive for COVID  Patient has been exposed  Patient currently does not have symptoms  Daughter requesting COVID test be ordered for patient  Please advise  Daughter, Hernandez Arceo ([phone]), would like a call from clinical team when the order is placed

## 2021-08-23 NOTE — TELEPHONE ENCOUNTER
Patients wife with symptoms about 6 days ago, then tested positive for COVID, now receiving Ab therapy  Patient without symptoms although not clear due to patient's dementia  Will order COVID test   Counseled on need to quarantine regardless of test results

## 2021-08-25 NOTE — TELEPHONE ENCOUNTER
Called pt's wife Loyd Mayra and relayed COVID results  Wife understood and would relay message to pt

## 2021-08-25 NOTE — TELEPHONE ENCOUNTER
----- Message from Kalani eRal MD sent at 8/25/2021  2:37 PM EDT -----  Please let family know that COVID test was negative  He will still require quarantine isolation due to ongoing exposure

## 2021-08-30 NOTE — TELEPHONE ENCOUNTER
From: Param Crawley  To: Ross Humphrey MD  Sent: 8/27/2021 12:14 PM EDT  Subject: Visit Follow-Up Question    This message is being sent by Michelle Ruth on behalf of Param Crawley  Estela Frey got up again this morning and the Anxiety was very high  I'm sorry if I've asked this before but he comes downstairs this morning and says he knows he is gonna die his legs are just shaking  Should I give him 2 Ativan they are  5 on a regular basis or is there some other kind of anxiety pill he can go on? Because he seems always depressed anymore     Thank you,   Jami Ayers

## 2021-09-07 NOTE — TELEPHONE ENCOUNTER
From: Jesus Wilson  To: Waylon Mendieta MD  Sent: 9/5/2021 9:19 AM EDT  Subject: Prescription Question    This message is being sent by Ever Hyman on behalf of Jesus Burciaga,   I only gave Daniel Flores a half pill of the seroquel for two night  After the first night he was so confused  He thought I was his ex-wife and his girlfriend was here  I even showed him pictures and tried to explain to him and he just shook his head like he didn't understand  I stopped the seroquel two nights ago and he seems a little bit better  Still confused but not near as confused as he was the first two nights  I didn't ask him questions or try to talk him out of it like the first night  It was just very upsetting and I didn't understand were it was coming from all of a sudden      Just wanted to give you a update,   Stuart Iyer

## 2021-09-14 NOTE — PATIENT INSTRUCTIONS
Adult Day Centers    Adult Day Centers offer an interactive, safe, supervised environment for older adults and adults with a dementia-related disease, Parkinsons Disease, or other organic brain syndromes  They provide personal care, nursing services, , therapeutic activities, nutrition, and therapeutic diets and emergency care  Some centers offer additional services such as physical therapy, occupational therapy, speech therapy, medical services, podiatry, etc  This community-based alternative to institutionalization also acts as a reliable source of support for caregivers  [facility]:    1  [facility] the [address] , [facility] [address]  [address], [address] [facility]  Phone: [phone]  Website:  Gio   2  Adult Day Services at [address], [address] [place] [address]  Phone: [phone]  Website:https://info  [facility]living org/adult-day-services-sn-tzpfpcyyrsi-dnykhpi-your-very-best-day  3  [facility]  [address] [facility] [address], [place] [address]  Phone: [phone]  Website:  [employer]/      [facility]:    1  [facility] AT [facility], [facility] [address]  [address] [facility], [place] [address]  Phone: [phone]  Website:  https://[employer]/  2  [facility] , [facility]  [address] [place] [address], [place] [address]  Phone: [phone]  Website:  [employer] gl  3  [facility]  [address] [facility], [place] [facility] [address]  Phone: [phone]  Website:  [employer] ca  4  [facility]  [address]  Phone: [phone]  Website:  [employer]/      List from website: http://www [facility] [employer]/  pa gov/local-resources/Pages/Adult-Day-Center  aspx

## 2021-09-14 NOTE — PROGRESS NOTES
[facility]  [address] [facility], [address] [place] [address], [address] [place] [address]    [facility]      NAME: Ty Turner  AGE: 74 y o  SEX: male    DATE OF ENCOUNTER: 9/14/2021    This note was not shared with the patient due to privacy exception: note includes other individuals    Assessment and Plan     Problem List Items Addressed This Visit        Digestive    Slow transit constipation      Encourage increased fiber/water intake   Consider prune juice daily   Continue MiraLax as needed   Physical activity as able             Respiratory    Obstructive sleep apnea syndrome (Chronic)      Stable   Continue CPAP use  Follow-up with Sleep Medicine as needed            Nervous and Auditory    Conductive hearing loss      Recommend facing patient directly and standing in close proximity when communicating to avoid confusion   Maintain [place] precautions         Dementia with behavioral disturbance ([facility] [address] ) - Primary     Patient unable to complete a mini cog and [place] today in office due to severity of cognitive deficits  He was alert and oriented to first name only   Patient assessed as having severe dementia NOS  Prior MRI neuro quant in 5/20 showed enlarged temporal horns more than expected for patient's age and additional finding of an abnormal hippocampal occupancy score  Overall findings are nonspecific and did not support a neuro degenerative process at that time per neurology  Patient did not complete a repeat MRI per Neurology recs which I would not advise at this time given the severity of his dementia and already trialled related meds  I did discuss this patient's case in detail with [facility]  Dementia team given hypersexuality and intolerance to antipsychotics in the past     Recommended feedback from dementia team was to trial olanzapine which has been effective with respect to hypersexuality  Was also recommended combination therapy with Zoloft    Patient is currently maintained on Celexa and would defer an additional medication change if trialing olanzapine  Per wife, patient developed a significant hallucinations and worsening confusion recently whilst on lorazepam as a result of which she weaned the patient off it of her own accord  Patient was also on Seroquel which wife felt had worsened symptoms overall  Patient's daughters stating the patient is verbally very abusive and aggressive towards wife  However wife immediately defensive of the patient despite what daughters relayed  Advised wife to remove guns that the patient currently owns and remains in the home as pt has recently threatened to harm himself with his guns  Once behaviors are managed, recommended the patient enroll into a senior  program for positive socialization, physical and cognitive activities  Patient's daughters extremely upset stating that the patient's mood has not been managed over the past several months despite several trials of psychotropic medications  I clarified to the patient's wife and daughters, that each patient is different and reacts to medications differently  I also realistically informed them that similarly, as prior PCP who has significant geriatric experience and esteemed knowlegde, I may need to alter medications should the patient have any reactions to the recommended treatment  Patient previously following with Psychiatry and recommended, given failure of multiple medication trials, suggest the patient follow-up with Psychiatry should medication management advice be needed  I also discussed with the patient and family that should behavioral symptoms worsen or escalate acutely, he should be taken to [address] [place] [address] Emergency Room for inpatient psychiatry assessment    Continue reorientation redirection as needed  Manage chronic conditions   Maintain [place] precautions   Encourage patient to remain active mentally, physically and socially  Patient to participate in cognitively challenging exercises as able  Will follow-up with a telephone visit in 1 week         Relevant Medications    OLANZapine (ZyPREXA) 2 5 mg tablet       Other    Anxiety (Chronic)      Patient with chronic anxiety  Has been on Celexa for several years however wife feels this has been ineffective  Continue Celexa for now   Will trial olanzapine after discussion with [facility] team   Will refer back to psychiatry for medication management  Continue supportive care by family and friends         Mixed hyperlipidemia (Chronic)      Patient continues on pravastatin 40 mg daily   Recommend adherence to a heart healthy diet   Physical activity as an exercise program                 - Counseling Documentation: patient was counseled regarding: diagnostic results, instructions for management, risk factor reductions, prognosis, patient and family education, impressions, risks and benefits of treatment options and importance of compliance with treatment    Chief Complaint     Patient presents to establish care at our primary care practice for older adults    History of Present Illness     HPI    This is a 68-year-old male with dementia NOS, anxiety, HLD, DREW, hearing loss and constipation amongst other medical conditions who presents to establish care at our primary care practice for older adults  Patient previously followed within our practice however wife requesting transferring care to a female provider given hypersexuality issues  Today the patient is accompanied by his wife and his daughters are present via video  Given the severity of the patient's dementia, history and review of systems were obtained from the patient's family members      Wife with obvious caregiver burnout and fortunately following with [facility] Psychotherapist, explains that over the past few months, the patient has become extremely  hypersexual   She explains that because of the patient's dementia, he often requests they have intercourse at least 5 times daily  Wife stating this has been both physically and mentally challenging for her and she has employed distraction techniques with only some success  She denies any aggression on the part of the patient if she does not comply with his requests and has currently been sexually active with him every other day which she feels continues to be overwhelming  She relays that the patient has had intermittent erectile dysfunction however this has not decreased the frequency of his hypersexuality  More recently, the patient has been  more inappropriate with his sister and daughters as family feels he has been hugging them excessively long and caressing their bodies which has become very uncomfortable for family members  Patient's sister apparently also stated that the patient had been kissing her inappropriately requesting her to sit on his lap  Wife is now fearful to leave the patient in the company of a female because of his hypersexual behavior  The patient previously followed with Neurology however has since transitioned to geriatrics in January of this year  Wife explains that she initially felt the patient had worsened with Aricept which was discontinued by Neurology  However at some point it was restarted and family feels there has been no change or delay in the patient's progression of his disease as they feel symptoms continued to rapidly decline  The patient was scheduled to have a repeat MRI as initial MRI per neurology did not support a neurodegenerative process  The patient also previously followed with Psychiatry however wife stated they have not followed up since last year  Overall the patient has been trialed on multiple psychotropic medications  Wife states he was initially on Abilify started by Psychiatry however she is unsure as to why this was discontinued and does not recall any untoward side effects   The patient has been maintained on Celexa which again initially improved his depressive mood, however family feels this has not been effective overall  More recently the patient was on lorazepam however wife states he developed significant hallucinations and worsening confusion as a result of which she weaned the patient off of this of her own accord  The patient was also on Seroquel which wife feels also worsened his symptoms  Today patient's daughters relate that the patient is very verbally abusive towards their mother  Wife immediately came to the patient's defense and stated that she continues to feel safe at home and does not feel he is very aggressive which conflicted with her daughter's history  Daughters were also very upset stating that over the past few months, they feel the patient's mood has not been managed effectively and instead had multiple trials with medications with no effect  I explained to the patient's wife and daughter that each patient reacts differently to medications  Unsure about the possibility of Lewy body dementia however today on clinical exam the patient did not have any parkinsonian features  I also realistically explained to the patient's family members that prior PCP has significant geriatric experience and extensive knowledge which I respect  Likewise I may similarly need to change medications  Would recommend patient reestablish care with psychiatry should my recommendations fail  Also discussed with family members that should patient's behaviors escalated acutely he should be taken to [facility] Emergency Room for inpatient psychiatry triage  Daughter stating that the patient continues to have guns in the house which he threatened to use on himself  I discussed with the patient's wife that she needed to remove the guns asap from the home  No recent SI/HI  The patient often does not recognize his wife and family members and requires frequent reorientation and redirection    Wife stating that while the patient was on lorazepam, he had been sleeping for up to 14-16 hours and upon awakening was significantly confused with hallucinations  Family often relate that the patient appears sometimes withdrawn  Typically he becomes agitated if his wife is not physically in proximity to him  The patient will attempt to dress himself however recently has been wearing his wife's undergarments in addition to his clothing  He has become dependent in all ADLs and IADLs  He continues to maintain a hearty appetite, has been sleeping  well on some nights only and been having regular bowel movements on current regimen  No cardiorespiratory distress, fever, chills, URI, urinary symptoms or recent falls  I did relay to patient's wife and daughters that I discussed the patient's case and concerns about hypersexuality with [facility]'s dementia team for their recommendations about medication management  After an extensive discussion, it was recommended the patient be trialed on olanzapine and Zoloft which has had somewhat better success in targeting hypersexyal behaviors  Wife and daughters were educated on side effects of both medications however will defer starting Zoloft until  Olanzapine is uptitrated if tolerated  The patient continues on pravastatin for a history of hyperlipidemia which remains controlled  He has been compliant with Celexa for a history of anxiety and dementia with behaviors  The patient also continues on MiraLax as needed for constipation        The following portions of the patient's history were reviewed and updated as appropriate: allergies, current medications, past family history, past medical history, past social history, past surgical history and problem list     Review of Systems     Review of Systems   Unable to perform ROS: Dementia       Active Problem List     Patient Active Problem List   Diagnosis    Anxiety    Backache    Mixed hyperlipidemia    Conductive hearing loss    Malignant melanoma of skin ([address] )    Obstructive sleep apnea syndrome    Symptoms involving urinary system    Dementia with behavioral disturbance (HCC)    Driving safety issue    Slow transit constipation       Objective     /56 (BP Location: Left arm, Patient Position: Sitting, Cuff Size: Adult)   Pulse 68   Temp (!) 97 4 °F (36 3 °C) (Temporal)   Resp 18   Ht 5' 9" (1 753 m)   Wt 75 4 kg (166 lb 3 2 oz)   SpO2 95%   BMI 24 54 kg/m²     Physical Exam  Vitals reviewed  Constitutional:       General: He is not in acute distress  Appearance: Normal appearance  He is well-developed  He is not ill-appearing or diaphoretic  Comments: Male patient sitting comfortably in chair in no obvious cardiorespiratory or painful distress   HENT:      Head: Normocephalic and atraumatic  Right Ear: Tympanic membrane, ear canal and external ear normal       Left Ear: Tympanic membrane, ear canal and external ear normal       Nose: Nose normal       Mouth/Throat:      Mouth: Mucous membranes are moist       Pharynx: Oropharynx is clear  No oropharyngeal exudate  Eyes:      General: No scleral icterus  Right eye: No discharge  Left eye: No discharge  Conjunctiva/sclera: Conjunctivae normal    Neck:      Vascular: No JVD  Cardiovascular:      Rate and Rhythm: Normal rate and regular rhythm  Heart sounds: Normal heart sounds  No murmur heard  No friction rub  No gallop  Pulmonary:      Effort: Pulmonary effort is normal  No respiratory distress  Breath sounds: Normal breath sounds  No wheezing or rales  Abdominal:      General: Bowel sounds are normal  There is no distension  Palpations: Abdomen is soft  Tenderness: There is no abdominal tenderness  There is no guarding  Musculoskeletal:         General: No tenderness or deformity  Normal range of motion  Cervical back: Normal range of motion and neck supple  Right lower leg: No edema  Left lower leg: No edema  Skin:     General: Skin is warm  Coloration: Skin is not pale  Findings: No erythema or rash  Neurological:      General: No focal deficit present  Mental Status: He is alert  Mental status is at baseline  He is disoriented  Cranial Nerves: No cranial nerve deficit  Motor: No weakness  Gait: Gait normal       Deep Tendon Reflexes: Reflexes are normal and symmetric  Psychiatric:      Comments: Patient confused at baseline         Pertinent Laboratory/Diagnostic Studies:  Reviewed prior blood work  I personally reviewed patient's MRI neuro quant on PACS  showing enlarged temporal horns more than expected for patient's age and additional finding of an   abnormal hippocampal occupancy score        Current Medications     Current Outpatient Medications:     citalopram (CeleXA) 40 mg tablet, Take 1 tablet (40 mg total) by mouth daily, Disp: 90 tablet, Rfl: 3    donepezil (ARICEPT) 10 mg tablet, Take 1 tablet (10 mg total) by mouth 2 (two) times a day, Disp: 180 tablet, Rfl: 3    Flaxseed, Linseed, (FLAX SEED OIL) 1300 MG CAPS, Take by mouth, Disp: , Rfl:     fluticasone (FLONASE) 50 mcg/act nasal spray, SPRAY 2 SPRAYS INTO EACH NOSTRIL EVERY DAY, Disp: 48 mL, Rfl: 1    ibuprofen (MOTRIN) 200 mg tablet, Take 400 mg by mouth 3 (three) times a day as needed for pain, Disp: , Rfl:     memantine (NAMENDA) 10 mg tablet, Take 1 tablet (10 mg total) by mouth 2 (two) times a day, Disp: 180 tablet, Rfl: 3    Multiple Vitamin (CVS DAILY MULTIPLE PO), Take by mouth, Disp: , Rfl:     pravastatin (PRAVACHOL) 40 mg tablet, Take 1 tablet (40 mg total) by mouth daily, Disp: 90 tablet, Rfl: 3    psyllium (METAMUCIL) 0 52 g capsule, Take 0 52 g by mouth daily, Disp: , Rfl:     OLANZapine (ZyPREXA) 2 5 mg tablet, Take 1 tablet (2 5 mg total) by mouth daily at bedtime, Disp: 14 tablet, Rfl: 0    polyethylene glycol (GLYCOLAX) 17 GM/SCOOP powder, Take 17 g by mouth daily as needed (constipation), Disp: 510 g, Rfl: 5    Health Maintenance     Health Maintenance   Topic Date Due    Depression Follow-up Plan  Never done    COVID-19 Vaccine (2 - Moderna 2-dose series) 02/26/2021    OT PLAN OF CARE  06/05/2021    PT PLAN OF CARE  09/01/2021    Influenza Vaccine (1) 09/01/2021    Depression Screening  10/08/2021    Medicare Annual Wellness Visit (AWV)  10/08/2021    Fall Risk  05/06/2022    BMI: Adult  09/14/2022    DTaP,Tdap,and Td Vaccines (3 - Td or Tdap) 11/03/2028    Colorectal Cancer Screening  12/01/2030    Hepatitis C Screening  Completed    Pneumococcal Vaccine: 65+ Years  Completed    HIB Vaccine  Aged Out    Hepatitis B Vaccine  Aged Out    IPV Vaccine  Aged Out    Hepatitis A Vaccine  Aged Out    Meningococcal ACWY Vaccine  Aged Out    HPV Vaccine  Aged [employer] History   Administered Date(s) Administered    INFLUENZA 09/15/2014, 10/28/2015, 09/28/2016, 09/12/2017    Influenza Quadrivalent Preservative Free 3 years and older IM 09/15/2014    Influenza Quadrivalent, 6-35 Months IM 10/28/2015, 09/28/2016, 09/12/2017    Influenza, high dose seasonal 0 7 mL 10/10/2018, 10/09/2019, 10/08/2020    Influenza, seasonal, injectable 10/08/2013    Pneumococcal Conjugate 13-Valent 12/10/2018    Pneumococcal Polysaccharide PPV23 12/30/2014, 10/08/2020    SARS-CoV-2 / COVID-19 mRNA IM (Elan Carlisle) 01/29/2021    Tdap 11/03/2014, 11/03/2018       Pedro Sales MD  Geriatrics   9/15/2021 5:35 AM

## 2021-09-15 PROBLEM — F03.918 DEMENTIA WITH BEHAVIORAL DISTURBANCE: Status: ACTIVE | Noted: 2020-04-07

## 2021-09-15 PROBLEM — F03.91 DEMENTIA WITH BEHAVIORAL DISTURBANCE (HCC): Status: ACTIVE | Noted: 2020-04-07

## 2021-09-15 NOTE — ASSESSMENT & PLAN NOTE
Patient with chronic anxiety  Has been on Celexa for several years however wife feels this has been ineffective  Continue Celexa for now   Will trial olanzapine after discussion with [facility] team   Will refer back to psychiatry for medication management  Continue supportive care by family and friends

## 2021-09-15 NOTE — ASSESSMENT & PLAN NOTE
Patient unable to complete a mini cog and [place] today in office due to severity of cognitive deficits  He was alert and oriented to first name only   Patient assessed as having severe dementia NOS  Prior MRI neuro quant in 5/20 showed enlarged temporal horns more than expected for patient's age and additional finding of an abnormal hippocampal occupancy score  Overall findings are nonspecific and did not support a neuro degenerative process at that time per neurology  Patient did not complete a repeat MRI per Neurology recs which I would not advise at this time given the severity of his dementia and already trialled related meds  I did discuss this patient's case in detail with [facility]  Dementia team given hypersexuality and intolerance to antipsychotics in the past     Recommended feedback from dementia team was to trial olanzapine which has been effective with respect to hypersexuality  Was also recommended combination therapy with Zoloft  Patient is currently maintained on Celexa and would defer an additional medication change if trialing olanzapine  Per wife, patient developed a significant hallucinations and worsening confusion recently whilst on lorazepam as a result of which she weaned the patient off it of her own accord  Patient was also on Seroquel which wife felt had worsened symptoms overall  Patient's daughters stating the patient is verbally very abusive and aggressive towards wife  However wife immediately defensive of the patient despite what daughters relayed  Advised wife to remove guns that the patient currently owns and remains in the home as pt has recently threatened to harm himself with his guns  Once behaviors are managed, recommended the patient enroll into a senior  program for positive socialization, physical and cognitive activities      Patient's daughters extremely upset stating that the patient's mood has not been managed over the past several months despite several trials of psychotropic medications  I clarified to the patient's wife and daughters, that each patient is different and reacts to medications differently  I also realistically informed them that similarly, as prior PCP who has significant geriatric experience and esteemed knowlegde, I may need to alter medications should the patient have any reactions to the recommended treatment  Patient previously following with Psychiatry and recommended, given failure of multiple medication trials, suggest the patient follow-up with Psychiatry should medication management advice be needed  I also discussed with the patient and family that should behavioral symptoms worsen or escalate acutely, he should be taken to [facility] Emergency Room for inpatient psychiatry assessment    Continue reorientation redirection as needed  Manage chronic conditions   Maintain [place] precautions   Encourage patient to remain active mentally, physically and socially  Patient to participate in cognitively challenging exercises as able  Will follow-up with a telephone visit in 1 week

## 2021-09-15 NOTE — ASSESSMENT & PLAN NOTE
Patient continues on pravastatin 40 mg daily   Recommend adherence to a heart healthy diet   Physical activity as an exercise program

## 2021-09-15 NOTE — ASSESSMENT & PLAN NOTE
Recommend facing patient directly and standing in close proximity when communicating to avoid confusion   Maintain [place] precautions

## 2021-09-15 NOTE — ASSESSMENT & PLAN NOTE
Encourage increased fiber/water intake   Consider prune juice daily   Continue MiraLax as needed   Physical activity as able

## 2021-09-16 NOTE — PATIENT INSTRUCTIONS
Acute Cough   WHAT YOU NEED TO KNOW:   An acute cough can last up to 3 weeks  Common causes of an acute cough include a cold, allergies, or a lung infection  DISCHARGE INSTRUCTIONS:   Return to the emergency department if:   · You have trouble breathing or feel short of breath  · You cough up blood, or you see blood in your mucus  · You faint or feel weak or dizzy  · You have chest pain when you cough or take a deep breath  · You have new wheezing  Contact your healthcare provider if:   · You have a fever  · Your cough lasts longer than 4 weeks  · Your symptoms do not improve with treatment  · You have questions or concerns about your condition or care  Medicines:   · Medicines  may be needed to stop the cough, decrease swelling in your airways, or help open your airways  Medicine may also be given to help you cough up mucus  Ask your healthcare provider what over-the-counter medicines you can take  If you have an infection caused by bacteria, you may need antibiotics  · Take your medicine as directed  Contact your healthcare provider if you think your medicine is not helping or if you have side effects  Tell him or her if you are allergic to any medicine  Keep a list of the medicines, vitamins, and herbs you take  Include the amounts, and when and why you take them  Bring the list or the pill bottles to follow-up visits  Carry your medicine list with you in case of an emergency  Manage your symptoms:   · Do not smoke and stay away from others who smoke  Nicotine and other chemicals in cigarettes and cigars can cause lung damage and make your cough worse  Ask your healthcare provider for information if you currently smoke and need help to quit  E-cigarettes or smokeless tobacco still contain nicotine  Talk to your healthcare provider before you use these products  · Drink extra liquids as directed  Liquids will help thin and loosen mucus so you can cough it up  Liquids will also help prevent dehydration  Examples of good liquids to drink include water, fruit juice, and broth  Do not drink liquids that contain caffeine  Caffeine can increase your risk for dehydration  Ask your healthcare provider how much liquid to drink each day  · Rest as directed  Do not do activities that make your cough worse, such as exercise  · Use a humidifier or vaporizer  Use a cool mist humidifier or a vaporizer to increase air moisture in your home  This may make it easier for you to breathe and help decrease your cough  · Eat 2 to 5 mL of honey 2 times each day  Honey can help thin mucus and decrease your cough  · Use cough drops or lozenges  These can help decrease throat irritation and your cough  Follow up with your healthcare provider as directed:  Write down your questions so you remember to ask them during your visits  © Copyright [employer] 2021 Information is for End User's use only and may not be sold, redistributed or otherwise used for commercial purposes  All illustrations and images included in CareNotes® are the copyrighted property of A D A M , Inc  or [facility]   The above information is an  only  It is not intended as medical advice for individual conditions or treatments  Talk to your doctor, nurse or pharmacist before following any medical regimen to see if it is safe and effective for you

## 2021-09-16 NOTE — PROGRESS NOTES
[facility]        NAME: Timothy Bosworth is a 74 y o  male  : 1955    MRN: [id number]  DATE: 2021  TIME: 9:24 AM    Assessment and Plan   Cough [R05]  1  Cough  dextromethorphan-guaiFENesin (ROBITUSSIN DM)  mg/5 mL syrup         Patient Instructions     Take med as directed  Follow up with PCP in 3-5 days  Proceed to  ER if symptoms worsen  Chief Complaint     Chief Complaint   Patient presents with    Cough         History of Present Illness       HPI   Brought to clinic by wife  Reports cough with phlegm x 2 days  No other symptoms  Cough is intermittent  Review of Systems   Review of Systems   Constitutional: Negative for chills and fever  HENT: Negative for sore throat  Respiratory: Positive for cough  Negative for chest tightness, shortness of breath and wheezing  Cardiovascular: Negative for chest pain  Gastrointestinal: Negative for diarrhea, nausea and vomiting           Current Medications       Current Outpatient Medications:     citalopram (CeleXA) 40 mg tablet, Take 1 tablet (40 mg total) by mouth daily, Disp: 90 tablet, Rfl: 3    dextromethorphan-guaiFENesin (ROBITUSSIN DM)  mg/5 mL syrup, Take 5 mL by mouth 3 (three) times a day as needed for cough, Disp: 236 mL, Rfl: 0    donepezil (ARICEPT) 10 mg tablet, Take 1 tablet (10 mg total) by mouth 2 (two) times a day, Disp: 180 tablet, Rfl: 3    Flaxseed, Linseed, (FLAX SEED OIL) 1300 MG CAPS, Take by mouth, Disp: , Rfl:     fluticasone (FLONASE) 50 mcg/act nasal spray, SPRAY 2 SPRAYS INTO EACH NOSTRIL EVERY DAY, Disp: 48 mL, Rfl: 1    ibuprofen (MOTRIN) 200 mg tablet, Take 400 mg by mouth 3 (three) times a day as needed for pain, Disp: , Rfl:     memantine (NAMENDA) 10 mg tablet, Take 1 tablet (10 mg total) by mouth 2 (two) times a day, Disp: 180 tablet, Rfl: 3    Multiple Vitamin ([employer] DAILY MULTIPLE PO), Take by mouth, Disp: , Rfl:     OLANZapine (ZyPREXA) 2 5 mg tablet, Take 1 tablet (2 5 mg total) by mouth daily at bedtime, Disp: 14 tablet, Rfl: 0    polyethylene glycol (GLYCOLAX) 17 GM/SCOOP powder, Take 17 g by mouth daily as needed (constipation), Disp: 510 g, Rfl: 5    pravastatin (PRAVACHOL) 40 mg tablet, Take 1 tablet (40 mg total) by mouth daily, Disp: 90 tablet, Rfl: 3    psyllium (METAMUCIL) 0 52 g capsule, Take 0 52 g by mouth daily, Disp: , Rfl:     Current Allergies     Allergies as of 09/16/2021 - Reviewed 09/16/2021   Allergen Reaction Noted    Quetiapine Confusion 09/07/2021    Fish oil + d3 [fish oil-cholecalciferol - food allergy] Rash 02/27/2014    Gabapentin Drowsiness 07/15/2021    Oseltamivir Rash 02/27/2014            The following portions of the patient's history were reviewed and updated as appropriate: allergies, current medications, past family history, past medical history, past social history, past surgical history and problem list      Past Medical History:   Diagnosis Date    Anxiety     BPH with obstruction/lower urinary tract symptoms     LAST ASSESSED: 9/15/14    Colon polyp     Dementia ([facility] [address] )     Depression     Hx of laceration of skin     LAST ASSESSED: 11/3/14    Hyperlipidemia     Memory loss     Pneumonia     LAST ASSESSED: 5/12/14    Sleep apnea     Tachycardia     LAST ASSESSED: 3/8/14    Thrombophlebitis of superficial veins of upper extremities     LAST ASSESSED: 5/8/90    Uncomplicated alcohol abuse     LAST ASSESSED: 2/28/15    Weakness of both arms     LAST ASSESSED: 5/12/14    Weakness of both legs     LAST ASSESSED: 5/12/14       Past Surgical History:   Procedure Laterality Date    COLONOSCOPY      COMPLETE    HERNIA REPAIR      SKIN LESION EXCISION      chest melanoma       Family History   Problem Relation Age of Onset    Arthritis Mother     Hyperlipidemia Mother     Dementia Mother         started late 76s    Tinnitus Mother     Hypertension Mother     Arthritis Family     Hyperlipidemia Family     Anxiety disorder Sister          Medications have been verified  Objective   Pulse 77   Temp (!) 97 3 °F (36 3 °C)   Resp 18   SpO2 94%   No LMP for male patient  Physical Exam     Physical Exam  Constitutional:       Appearance: He is not ill-appearing or diaphoretic  HENT:      Right Ear: Tympanic membrane and ear canal normal       Left Ear: Tympanic membrane and ear canal normal       Nose: Rhinorrhea present  Mouth/Throat:      Pharynx: No posterior oropharyngeal erythema  Comments: Mild post nasal drip  Cardiovascular:      Rate and Rhythm: Regular rhythm  Heart sounds: Normal heart sounds  Pulmonary:      Effort: Pulmonary effort is normal       Breath sounds: Normal breath sounds

## 2021-09-21 NOTE — PROGRESS NOTES
Virtual Brief Visit    Verification of patient location:    Patient is located in the following state in which I hold an active license [place]      Assessment/Plan:    Problem List Items Addressed This Visit        Nervous and Auditory    Early onset Alzheimer's disease with behavioral disturbance (HCC) (Chronic)    Relevant Medications    OLANZapine (ZyPREXA) 2 5 mg tablet    Dementia with behavioral disturbance ([facility] [address] ) - Primary     Patient presents for follow-up after recent medication change   At recent visit to establish care with me, wife and family members expressed patient's hypersexual behavior which was uncontrollable  Patient was also noted to be extremely agitated with hallucinations  Wife explained that agitation and hallucinations had worsened with lorazepam and Seroquel  I had discussed with [facility] dementia team with recommendations to start olanzapine which wife and daughters relay have significantly decreased patient's hypersexual behaviors  Wife is not relieved that the patient can resume his speech therapy as she is not fearful of him her asking staff due to his hypersexual behavior  Wife admits that patient's hypersexual behavior has significantly decreased although he continues to complement her  Discussed with wife that we will maintain the current dosage as she was made aware and educated on side effects of olanzapine   Patient can continue Aricept 10 mg at nighttime   Continue Namenda 10 mg b i d     He can also continue on Celexa 40 mg daily  Also realistically discussed with wife that his behaviors may escalate in the future and she should call the office should this happen  Will continue to follow         Relevant Medications    OLANZapine (ZyPREXA) 2 5 mg tablet                Reason for visit is   Chief Complaint   Patient presents with    Follow-up     medication check    Virtual Brief Visit        Encounter provider Alfonzo Crooks MD    Provider located at [facility] [address]  [address]  [place] [address] [id number]  [phone]    Recent Visits  Date Type Provider Dept   09/21/21 Office Visit Ihsan Henley MD [address]   Showing recent visits within past 7 days and meeting all other requirements  Future Appointments  No visits were found meeting these conditions  Showing future appointments within next 150 days and meeting all other requirements       After connecting through telephone, the patient was identified by name and date of birth  Christine Tony was informed that this is a telemedicine visit and that the visit is being conducted through telephone  My office door was closed  No one else was in the room  He acknowledged consent and understanding of privacy and security of the platform  The patient has agreed to participate and understands he can discontinue the visit at any time  Patient is aware this is a billable service  Subjective    Christine Tony is a 74 y o  male who presents  for follow-up after recent medication change    HPI     Patient presents for follow-up after recent medication change  Given severity of patient's dementia, review of systems and history obtained from patient's wife  Per wife, since starting olanzapine, the patient has had significant improvement in his behaviors  Wife states his hypersexual behaviors have decreased immensely although he does continue to complement her on how beautiful she is and how much she loves her  Wife, son-in-law and daughters feel that overall the patient's agitation and hypersexual behaviors are better controlled since starting olanzapine  I realistically did explain to the patient's wife that his behaviors may escalate in the future and she should call the office if this happens     Family however feel the patient has become more redirectable  Wife was relieved that she felt comfortable now that the patient can safely return to speech therapy for cognitive rehabilitation as she was fearful his hypersexual behaviors towards staff members would cause him to be suspended from therapy  I again discussed senior  programs with the wife however she stated she would defer this at this time as she felt the patient's behaviors and now manageable  Advised patient to continue Aricept 10 mg at nighttime, Namenda twice daily dosing and Celexa  Wife was concerned that in the future if the patient did not sleep at night how should this be manage  She was advised on starting melatonin as needed   Will follow-up as scheduled        Past Medical History:   Diagnosis Date    Anxiety     BPH with obstruction/lower urinary tract symptoms     LAST ASSESSED: 9/15/14    Colon polyp     Dementia ([facility] [address] )     Depression     Hx of laceration of skin     LAST ASSESSED: 11/3/14    Hyperlipidemia     Memory loss     Pneumonia     LAST ASSESSED: 5/12/14    Sleep apnea     Tachycardia     LAST ASSESSED: 3/8/14    Thrombophlebitis of superficial veins of upper extremities     LAST ASSESSED: 9/2/92    Uncomplicated alcohol abuse     LAST ASSESSED: 2/28/15    Weakness of both arms     LAST ASSESSED: 5/12/14    Weakness of both legs     LAST ASSESSED: 5/12/14       Past Surgical History:   Procedure Laterality Date    COLONOSCOPY      COMPLETE    HERNIA REPAIR      SKIN LESION EXCISION      chest melanoma       Current Outpatient Medications   Medication Sig Dispense Refill    citalopram (CeleXA) 40 mg tablet Take 1 tablet (40 mg total) by mouth daily 90 tablet 3    donepezil (ARICEPT) 10 mg tablet Take 1 tablet (10 mg total) by mouth 2 (two) times a day (Patient taking differently: Take 10 mg by mouth daily ) 180 tablet 3    Flaxseed, Linseed, (FLAX SEED OIL) 1300 MG CAPS Take by mouth      fluticasone (FLONASE) 50 mcg/act nasal spray SPRAY 2 SPRAYS INTO EACH NOSTRIL EVERY DAY 48 mL 1    ibuprofen (MOTRIN) 200 mg tablet Take 400 mg by mouth 3 (three) times a day as needed for pain      memantine (NAMENDA) 10 mg tablet Take 1 tablet (10 mg total) by mouth 2 (two) times a day 180 tablet 3    Multiple Vitamin (CVS DAILY MULTIPLE PO) Take by mouth      OLANZapine (ZyPREXA) 2 5 mg tablet Take 1 tablet (2 5 mg total) by mouth daily at bedtime 30 tablet 0    pravastatin (PRAVACHOL) 40 mg tablet Take 1 tablet (40 mg total) by mouth daily 90 tablet 3    psyllium (METAMUCIL) 0 52 g capsule Take 0 52 g by mouth daily      dextromethorphan-guaiFENesin (ROBITUSSIN DM)  mg/5 mL syrup Take 5 mL by mouth 3 (three) times a day as needed for cough (Patient not taking: Reported on 9/21/2021) 236 mL 0    polyethylene glycol (GLYCOLAX) 17 GM/SCOOP powder Take 17 g by mouth daily as needed (constipation) 510 g 5     No current facility-administered medications for this visit  Allergies   Allergen Reactions    Quetiapine Confusion     Became more confused with 12 5 mg quetiapine therapy    Fish Oil + D3 [Fish Oil-Cholecalciferol - Food Allergy] Rash    Gabapentin Drowsiness    Oseltamivir Rash       Review of Systems   Unable to perform ROS: Dementia       There were no vitals filed for this visit  I spent 20 minutes directly with the patient during this visit    VIRTUAL VISIT DISCLAIMER      Rosangela Hopkins verbally agrees to participate in [employer]  Pt is aware that [employer] could be limited without vital signs or the ability to perform a full hands-on physical Shaye Carranza understands he or the provider may request at any time to terminate the video visit and request the patient to seek care or treatment in person

## 2021-09-23 PROBLEM — F02.81 EARLY ONSET ALZHEIMER'S DISEASE WITH BEHAVIORAL DISTURBANCE (HCC): Chronic | Status: ACTIVE | Noted: 2021-01-01

## 2021-09-23 PROBLEM — G30.0 EARLY ONSET ALZHEIMER'S DISEASE WITH BEHAVIORAL DISTURBANCE (HCC): Chronic | Status: ACTIVE | Noted: 2021-01-01

## 2021-09-23 PROBLEM — F02.818 EARLY ONSET ALZHEIMER'S DISEASE WITH BEHAVIORAL DISTURBANCE (HCC): Chronic | Status: ACTIVE | Noted: 2021-01-01

## 2021-09-23 NOTE — ASSESSMENT & PLAN NOTE
Patient presents for follow-up after recent medication change   At recent visit to establish care with me, wife and family members expressed patient's hypersexual behavior which was uncontrollable  Patient was also noted to be extremely agitated with hallucinations  Wife explained that agitation and hallucinations had worsened with lorazepam and Seroquel  I had discussed with [facility] dementia team with recommendations to start olanzapine which wife and daughters relay have significantly decreased patient's hypersexual behaviors  Wife is not relieved that the patient can resume his speech therapy as she is not fearful of him her asking staff due to his hypersexual behavior  Wife admits that patient's hypersexual behavior has significantly decreased although he continues to complement her  Discussed with wife that we will maintain the current dosage as she was made aware and educated on side effects of olanzapine   Patient can continue Aricept 10 mg at nighttime   Continue Namenda 10 mg b i d     He can also continue on Celexa 40 mg daily  Also realistically discussed with wife that his behaviors may escalate in the future and she should call the office should this happen  Will continue to follow No

## 2021-10-15 PROBLEM — Z23 NEED FOR IMMUNIZATION AGAINST INFLUENZA: Status: ACTIVE | Noted: 2021-01-01

## 2022-01-01 ENCOUNTER — TELEPHONE (OUTPATIENT)
Dept: GERIATRICS | Age: 67
End: 2022-01-01

## 2022-01-01 ENCOUNTER — TELEMEDICINE (OUTPATIENT)
Dept: GERIATRICS | Age: 67
End: 2022-01-01
Payer: MEDICARE

## 2022-01-01 ENCOUNTER — TELEPHONE (OUTPATIENT)
Dept: UROLOGY | Facility: AMBULATORY SURGERY CENTER | Age: 67
End: 2022-01-01

## 2022-01-01 ENCOUNTER — HOME CARE VISIT (OUTPATIENT)
Dept: HOME HEALTH SERVICES | Facility: HOME HEALTHCARE | Age: 67
End: 2022-01-01

## 2022-01-01 ENCOUNTER — HOSPITAL ENCOUNTER (INPATIENT)
Facility: HOSPITAL | Age: 67
LOS: 6 days | DRG: 177 | End: 2022-07-15
Attending: EMERGENCY MEDICINE | Admitting: INTERNAL MEDICINE
Payer: MEDICARE

## 2022-01-01 ENCOUNTER — APPOINTMENT (EMERGENCY)
Dept: RADIOLOGY | Facility: HOSPITAL | Age: 67
DRG: 177 | End: 2022-01-01
Payer: MEDICARE

## 2022-01-01 ENCOUNTER — APPOINTMENT (OUTPATIENT)
Dept: LAB | Facility: CLINIC | Age: 67
End: 2022-01-01
Payer: MEDICARE

## 2022-01-01 ENCOUNTER — APPOINTMENT (EMERGENCY)
Dept: CT IMAGING | Facility: HOSPITAL | Age: 67
DRG: 177 | End: 2022-01-01
Payer: MEDICARE

## 2022-01-01 ENCOUNTER — OFFICE VISIT (OUTPATIENT)
Dept: OCCUPATIONAL THERAPY | Facility: CLINIC | Age: 67
End: 2022-01-01
Payer: MEDICARE

## 2022-01-01 ENCOUNTER — APPOINTMENT (OUTPATIENT)
Dept: OCCUPATIONAL THERAPY | Facility: CLINIC | Age: 67
End: 2022-01-01
Payer: MEDICARE

## 2022-01-01 ENCOUNTER — TRANSCRIBE ORDERS (OUTPATIENT)
Dept: HOME HEALTH SERVICES | Facility: HOME HEALTHCARE | Age: 67
End: 2022-01-01

## 2022-01-01 ENCOUNTER — OFFICE VISIT (OUTPATIENT)
Dept: GERIATRICS | Age: 67
End: 2022-01-01
Payer: MEDICARE

## 2022-01-01 ENCOUNTER — OFFICE VISIT (OUTPATIENT)
Dept: UROLOGY | Facility: AMBULATORY SURGERY CENTER | Age: 67
End: 2022-01-01
Payer: MEDICARE

## 2022-01-01 ENCOUNTER — OFFICE VISIT (OUTPATIENT)
Dept: URGENT CARE | Facility: MEDICAL CENTER | Age: 67
End: 2022-01-01
Payer: MEDICARE

## 2022-01-01 VITALS
OXYGEN SATURATION: 94 % | BODY MASS INDEX: 23.79 KG/M2 | WEIGHT: 160.6 LBS | DIASTOLIC BLOOD PRESSURE: 82 MMHG | HEIGHT: 69 IN | TEMPERATURE: 97.9 F | SYSTOLIC BLOOD PRESSURE: 110 MMHG | HEART RATE: 78 BPM | RESPIRATION RATE: 12 BRPM

## 2022-01-01 VITALS
DIASTOLIC BLOOD PRESSURE: 80 MMHG | OXYGEN SATURATION: 98 % | BODY MASS INDEX: 24.8 KG/M2 | WEIGHT: 158 LBS | SYSTOLIC BLOOD PRESSURE: 122 MMHG | HEART RATE: 76 BPM | HEIGHT: 67 IN

## 2022-01-01 VITALS
HEIGHT: 67 IN | BODY MASS INDEX: 24.8 KG/M2 | OXYGEN SATURATION: 94 % | RESPIRATION RATE: 18 BRPM | SYSTOLIC BLOOD PRESSURE: 121 MMHG | WEIGHT: 158 LBS | TEMPERATURE: 98.2 F | DIASTOLIC BLOOD PRESSURE: 72 MMHG | HEART RATE: 84 BPM

## 2022-01-01 VITALS
HEART RATE: 88 BPM | WEIGHT: 155.2 LBS | RESPIRATION RATE: 16 BRPM | BODY MASS INDEX: 23.52 KG/M2 | DIASTOLIC BLOOD PRESSURE: 72 MMHG | HEIGHT: 68 IN | SYSTOLIC BLOOD PRESSURE: 100 MMHG | OXYGEN SATURATION: 96 % | TEMPERATURE: 97.7 F

## 2022-01-01 VITALS
SYSTOLIC BLOOD PRESSURE: 91 MMHG | DIASTOLIC BLOOD PRESSURE: 63 MMHG | BODY MASS INDEX: 16.66 KG/M2 | RESPIRATION RATE: 26 BRPM | HEART RATE: 134 BPM | WEIGHT: 109.57 LBS | TEMPERATURE: 97.9 F | OXYGEN SATURATION: 88 %

## 2022-01-01 DIAGNOSIS — F03.91 DEMENTIA WITH BEHAVIORAL DISTURBANCE, UNSPECIFIED DEMENTIA TYPE (HCC): ICD-10-CM

## 2022-01-01 DIAGNOSIS — F03.91 DEMENTIA WITH BEHAVIORAL DISTURBANCE, UNSPECIFIED DEMENTIA TYPE (HCC): Primary | ICD-10-CM

## 2022-01-01 DIAGNOSIS — F03.90 DEMENTIA (HCC): ICD-10-CM

## 2022-01-01 DIAGNOSIS — I95.9 HYPOTENSION: ICD-10-CM

## 2022-01-01 DIAGNOSIS — K59.01 SLOW TRANSIT CONSTIPATION: ICD-10-CM

## 2022-01-01 DIAGNOSIS — E78.2 MIXED HYPERLIPIDEMIA: Chronic | ICD-10-CM

## 2022-01-01 DIAGNOSIS — Z11.1 SCREENING FOR TUBERCULOSIS: Primary | ICD-10-CM

## 2022-01-01 DIAGNOSIS — R39.9 SYMPTOMS INVOLVING URINARY SYSTEM: ICD-10-CM

## 2022-01-01 DIAGNOSIS — R05.9 COUGH: ICD-10-CM

## 2022-01-01 DIAGNOSIS — K59.01 SLOW TRANSIT CONSTIPATION: Primary | ICD-10-CM
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
## 2022-01-01 DIAGNOSIS — U07.1 PNEUMONIA DUE TO COVID-19 VIRUS: ICD-10-CM

## 2022-01-01 DIAGNOSIS — R63.0 DECREASED APPETITE: ICD-10-CM

## 2022-01-01 DIAGNOSIS — H90.2 CONDUCTIVE HEARING LOSS, UNSPECIFIED LATERALITY: ICD-10-CM

## 2022-01-01 DIAGNOSIS — F41.9 ANXIETY: Chronic | ICD-10-CM

## 2022-01-01 DIAGNOSIS — Z12.5 SCREENING FOR PROSTATE CANCER: Primary | ICD-10-CM

## 2022-01-01 DIAGNOSIS — J12.82 PNEUMONIA DUE TO COVID-19 VIRUS: ICD-10-CM

## 2022-01-01 DIAGNOSIS — G89.29 CHRONIC BILATERAL LOW BACK PAIN WITHOUT SCIATICA: ICD-10-CM

## 2022-01-01 DIAGNOSIS — M54.50 CHRONIC BILATERAL LOW BACK PAIN WITHOUT SCIATICA: ICD-10-CM

## 2022-01-01 DIAGNOSIS — R05.9 COUGH: Primary | ICD-10-CM

## 2022-01-01 DIAGNOSIS — G47.33 OBSTRUCTIVE SLEEP APNEA SYNDROME: Chronic | ICD-10-CM

## 2022-01-01 DIAGNOSIS — G30.0 EARLY ONSET ALZHEIMER'S DISEASE WITH BEHAVIORAL DISTURBANCE (HCC): Chronic | ICD-10-CM

## 2022-01-01 DIAGNOSIS — F41.9 ANXIETY: Primary | Chronic | ICD-10-CM

## 2022-01-01 DIAGNOSIS — Z13.29 THYROID DISORDER SCREENING: ICD-10-CM

## 2022-01-01 DIAGNOSIS — F02.81 EARLY ONSET ALZHEIMER'S DISEASE WITH BEHAVIORAL DISTURBANCE (HCC): Chronic | ICD-10-CM

## 2022-01-01 DIAGNOSIS — Z13.21 ENCOUNTER FOR VITAMIN DEFICIENCY SCREENING: ICD-10-CM

## 2022-01-01 DIAGNOSIS — Z12.5 SCREENING FOR PROSTATE CANCER: ICD-10-CM

## 2022-01-01 DIAGNOSIS — Z71.89 GOALS OF CARE, COUNSELING/DISCUSSION: ICD-10-CM

## 2022-01-01 DIAGNOSIS — S00.93XA HEAD CONTUSION: ICD-10-CM

## 2022-01-01 DIAGNOSIS — G96.9 CENTRAL NERVOUS SYSTEM COMPLICATION: Primary | ICD-10-CM

## 2022-01-01 DIAGNOSIS — Z00.00 MEDICARE ANNUAL WELLNESS VISIT, SUBSEQUENT: ICD-10-CM

## 2022-01-01 DIAGNOSIS — Z11.1 SCREENING FOR TUBERCULOSIS: ICD-10-CM

## 2022-01-01 LAB
ALBUMIN SERPL BCP-MCNC: 3.7 G/DL (ref 3.5–5)
ALP SERPL-CCNC: 53 U/L (ref 34–104)
ALT SERPL W P-5'-P-CCNC: 54 U/L (ref 7–52)
ANION GAP SERPL CALCULATED.3IONS-SCNC: 7 MMOL/L (ref 4–13)
APTT PPP: 33 SECONDS (ref 23–37)
AST SERPL W P-5'-P-CCNC: 155 U/L (ref 13–39)
ATRIAL RATE: 55 BPM
BACTERIA BLD CULT: ABNORMAL
BACTERIA BLD CULT: NORMAL
BACTERIA UR QL AUTO: NORMAL /HPF
BASOPHILS # BLD AUTO: 0.01 THOUSANDS/ΜL (ref 0–0.1)
BASOPHILS NFR BLD AUTO: 0 % (ref 0–1)
BILIRUB SERPL-MCNC: 0.58 MG/DL (ref 0.2–1)
BILIRUB UR QL STRIP: NEGATIVE
BUN SERPL-MCNC: 28 MG/DL (ref 5–25)
CALCIUM SERPL-MCNC: 9 MG/DL (ref 8.4–10.2)
CHLORIDE SERPL-SCNC: 101 MMOL/L (ref 96–108)
CLARITY UR: CLEAR
CO2 SERPL-SCNC: 29 MMOL/L (ref 21–32)
COLOR UR: YELLOW
CREAT SERPL-MCNC: 0.84 MG/DL (ref 0.6–1.3)
D DIMER PPP FEU-MCNC: 0.87 UG/ML FEU
EOSINOPHIL # BLD AUTO: 0.05 THOUSAND/ΜL (ref 0–0.61)
EOSINOPHIL NFR BLD AUTO: 1 % (ref 0–6)
ERYTHROCYTE [DISTWIDTH] IN BLOOD BY AUTOMATED COUNT: 12.4 % (ref 11.6–15.1)
FLUAV RNA RESP QL NAA+PROBE: NEGATIVE
FLUAV RNA RESP QL NAA+PROBE: NEGATIVE
FLUBV RNA RESP QL NAA+PROBE: NEGATIVE
FLUBV RNA RESP QL NAA+PROBE: NEGATIVE
GAMMA INTERFERON BACKGROUND BLD IA-ACNC: 0.02 IU/ML
GFR SERPL CREATININE-BSD FRML MDRD: 89 ML/MIN/1.73SQ M
GLUCOSE SERPL-MCNC: 85 MG/DL (ref 65–140)
GLUCOSE UR STRIP-MCNC: NEGATIVE MG/DL
GRAM STN SPEC: ABNORMAL
HCT VFR BLD AUTO: 39.1 % (ref 36.5–49.3)
HGB BLD-MCNC: 13.1 G/DL (ref 12–17)
HGB UR QL STRIP.AUTO: ABNORMAL
IMM GRANULOCYTES # BLD AUTO: 0.01 THOUSAND/UL (ref 0–0.2)
IMM GRANULOCYTES NFR BLD AUTO: 0 % (ref 0–2)
INR PPP: 0.95 (ref 0.84–1.19)
KETONES UR STRIP-MCNC: NEGATIVE MG/DL
LACTATE SERPL-SCNC: 0.9 MMOL/L (ref 0.5–2)
LEUKOCYTE ESTERASE UR QL STRIP: NEGATIVE
LIPASE SERPL-CCNC: 15 U/L (ref 11–82)
LYMPHOCYTES # BLD AUTO: 1.21 THOUSANDS/ΜL (ref 0.6–4.47)
LYMPHOCYTES NFR BLD AUTO: 32 % (ref 14–44)
M TB IFN-G BLD-IMP: NEGATIVE
M TB IFN-G CD4+ BCKGRND COR BLD-ACNC: 0 IU/ML
M TB IFN-G CD4+ BCKGRND COR BLD-ACNC: 0 IU/ML
MAGNESIUM SERPL-MCNC: 2 MG/DL (ref 1.9–2.7)
MCH RBC QN AUTO: 30.3 PG (ref 26.8–34.3)
MCHC RBC AUTO-ENTMCNC: 33.5 G/DL (ref 31.4–37.4)
MCV RBC AUTO: 91 FL (ref 82–98)
MITOGEN IGNF BCKGRD COR BLD-ACNC: >10 IU/ML
MONOCYTES # BLD AUTO: 0.56 THOUSAND/ΜL (ref 0.17–1.22)
MONOCYTES NFR BLD AUTO: 15 % (ref 4–12)
NEUTROPHILS # BLD AUTO: 1.96 THOUSANDS/ΜL (ref 1.85–7.62)
NEUTS SEG NFR BLD AUTO: 52 % (ref 43–75)
NITRITE UR QL STRIP: NEGATIVE
NON-SQ EPI CELLS URNS QL MICRO: NORMAL /HPF
NRBC BLD AUTO-RTO: 0 /100 WBCS
P AXIS: 75 DEGREES
PH UR STRIP.AUTO: 6 [PH]
PLATELET # BLD AUTO: 158 THOUSANDS/UL (ref 149–390)
PMV BLD AUTO: 9.7 FL (ref 8.9–12.7)
POST-VOID RESIDUAL VOLUME, ML POC: 14 ML
POTASSIUM SERPL-SCNC: 3.4 MMOL/L (ref 3.5–5.3)
PR INTERVAL: 148 MS
PROCALCITONIN SERPL-MCNC: 0.07 NG/ML
PROT SERPL-MCNC: 6 G/DL (ref 6.4–8.4)
PROT UR STRIP-MCNC: NEGATIVE MG/DL
PROTHROMBIN TIME: 12.6 SECONDS (ref 11.6–14.5)
PSA SERPL-MCNC: 1.3 NG/ML (ref 0–4)
QRS AXIS: 67 DEGREES
QRSD INTERVAL: 78 MS
QT INTERVAL: 476 MS
QTC INTERVAL: 455 MS
RBC # BLD AUTO: 4.32 MILLION/UL (ref 3.88–5.62)
RBC #/AREA URNS AUTO: NORMAL /HPF
RSV RNA RESP QL NAA+PROBE: NEGATIVE
RSV RNA RESP QL NAA+PROBE: NEGATIVE
S EPIDERMIDIS DNA BLD POS QL NAA+NON-PRB: DETECTED
SARS-COV-2 RNA RESP QL NAA+PROBE: POSITIVE
SARS-COV-2 RNA RESP QL NAA+PROBE: POSITIVE
SODIUM SERPL-SCNC: 137 MMOL/L (ref 135–147)
SP GR UR STRIP.AUTO: 1.02 (ref 1–1.03)
T WAVE AXIS: 69 DEGREES
UROBILINOGEN UR QL STRIP.AUTO: 0.2 E.U./DL
VENTRICULAR RATE: 55 BPM
WBC # BLD AUTO: 3.8 THOUSAND/UL (ref 4.31–10.16)
WBC #/AREA URNS AUTO: NORMAL /HPF

## 2022-01-01 PROCEDURE — 99213 OFFICE O/P EST LOW 20 MIN: CPT | Performed by: PHYSICIAN ASSISTANT

## 2022-01-01 PROCEDURE — 83690 ASSAY OF LIPASE: CPT | Performed by: EMERGENCY MEDICINE

## 2022-01-01 PROCEDURE — 71275 CT ANGIOGRAPHY CHEST: CPT

## 2022-01-01 PROCEDURE — 85025 COMPLETE CBC W/AUTO DIFF WBC: CPT | Performed by: EMERGENCY MEDICINE

## 2022-01-01 PROCEDURE — 99215 OFFICE O/P EST HI 40 MIN: CPT | Performed by: STUDENT IN AN ORGANIZED HEALTH CARE EDUCATION/TRAINING PROGRAM

## 2022-01-01 PROCEDURE — 51798 US URINE CAPACITY MEASURE: CPT | Performed by: UROLOGY

## 2022-01-01 PROCEDURE — 99233 SBSQ HOSP IP/OBS HIGH 50: CPT

## 2022-01-01 PROCEDURE — 93005 ELECTROCARDIOGRAM TRACING: CPT

## 2022-01-01 PROCEDURE — 99214 OFFICE O/P EST MOD 30 MIN: CPT | Performed by: NURSE PRACTITIONER

## 2022-01-01 PROCEDURE — 85379 FIBRIN DEGRADATION QUANT: CPT | Performed by: EMERGENCY MEDICINE

## 2022-01-01 PROCEDURE — 36415 COLL VENOUS BLD VENIPUNCTURE: CPT

## 2022-01-01 PROCEDURE — 0241U HB NFCT DS VIR RESP RNA 4 TRGT: CPT

## 2022-01-01 PROCEDURE — 87154 CUL TYP ID BLD PTHGN 6+ TRGT: CPT | Performed by: EMERGENCY MEDICINE

## 2022-01-01 PROCEDURE — 80053 COMPREHEN METABOLIC PANEL: CPT | Performed by: EMERGENCY MEDICINE

## 2022-01-01 PROCEDURE — 99213 OFFICE O/P EST LOW 20 MIN: CPT | Performed by: STUDENT IN AN ORGANIZED HEALTH CARE EDUCATION/TRAINING PROGRAM

## 2022-01-01 PROCEDURE — 97530 THERAPEUTIC ACTIVITIES: CPT

## 2022-01-01 PROCEDURE — 83735 ASSAY OF MAGNESIUM: CPT | Performed by: EMERGENCY MEDICINE

## 2022-01-01 PROCEDURE — 83605 ASSAY OF LACTIC ACID: CPT | Performed by: EMERGENCY MEDICINE

## 2022-01-01 PROCEDURE — 86480 TB TEST CELL IMMUN MEASURE: CPT

## 2022-01-01 PROCEDURE — 85730 THROMBOPLASTIN TIME PARTIAL: CPT | Performed by: EMERGENCY MEDICINE

## 2022-01-01 PROCEDURE — 85610 PROTHROMBIN TIME: CPT | Performed by: EMERGENCY MEDICINE

## 2022-01-01 PROCEDURE — 99285 EMERGENCY DEPT VISIT HI MDM: CPT

## 2022-01-01 PROCEDURE — 74177 CT ABD & PELVIS W/CONTRAST: CPT

## 2022-01-01 PROCEDURE — G0463 HOSPITAL OUTPT CLINIC VISIT: HCPCS | Performed by: PHYSICIAN ASSISTANT

## 2022-01-01 PROCEDURE — 87040 BLOOD CULTURE FOR BACTERIA: CPT | Performed by: EMERGENCY MEDICINE

## 2022-01-01 PROCEDURE — 0241U HB NFCT DS VIR RESP RNA 4 TRGT: CPT | Performed by: EMERGENCY MEDICINE

## 2022-01-01 PROCEDURE — 99223 1ST HOSP IP/OBS HIGH 75: CPT | Performed by: NURSE PRACTITIONER

## 2022-01-01 PROCEDURE — 96375 TX/PRO/DX INJ NEW DRUG ADDON: CPT

## 2022-01-01 PROCEDURE — XW033E5 INTRODUCTION OF REMDESIVIR ANTI-INFECTIVE INTO PERIPHERAL VEIN, PERCUTANEOUS APPROACH, NEW TECHNOLOGY GROUP 5: ICD-10-PCS | Performed by: HOSPITALIST

## 2022-01-01 PROCEDURE — 81001 URINALYSIS AUTO W/SCOPE: CPT | Performed by: EMERGENCY MEDICINE

## 2022-01-01 PROCEDURE — 72125 CT NECK SPINE W/O DYE: CPT

## 2022-01-01 PROCEDURE — 99214 OFFICE O/P EST MOD 30 MIN: CPT | Performed by: UROLOGY

## 2022-01-01 PROCEDURE — 96365 THER/PROPH/DIAG IV INF INIT: CPT

## 2022-01-01 PROCEDURE — 84145 PROCALCITONIN (PCT): CPT | Performed by: EMERGENCY MEDICINE

## 2022-01-01 PROCEDURE — 70450 CT HEAD/BRAIN W/O DYE: CPT

## 2022-01-01 PROCEDURE — 36415 COLL VENOUS BLD VENIPUNCTURE: CPT | Performed by: EMERGENCY MEDICINE

## 2022-01-01 PROCEDURE — 99238 HOSP IP/OBS DSCHRG MGMT 30/<: CPT

## 2022-01-01 PROCEDURE — 96367 TX/PROPH/DG ADDL SEQ IV INF: CPT

## 2022-01-01 PROCEDURE — G0103 PSA SCREENING: HCPCS

## 2022-01-01 PROCEDURE — 96361 HYDRATE IV INFUSION ADD-ON: CPT

## 2022-01-01 PROCEDURE — 99232 SBSQ HOSP IP/OBS MODERATE 35: CPT | Performed by: INTERNAL MEDICINE

## 2022-01-01 PROCEDURE — 93010 ELECTROCARDIOGRAM REPORT: CPT | Performed by: INTERNAL MEDICINE

## 2022-01-01 PROCEDURE — G0439 PPPS, SUBSEQ VISIT: HCPCS | Performed by: STUDENT IN AN ORGANIZED HEALTH CARE EDUCATION/TRAINING PROGRAM

## 2022-01-01 PROCEDURE — 71045 X-RAY EXAM CHEST 1 VIEW: CPT

## 2022-01-01 PROCEDURE — G1004 CDSM NDSC: HCPCS

## 2022-01-01 PROCEDURE — 99291 CRITICAL CARE FIRST HOUR: CPT | Performed by: EMERGENCY MEDICINE

## 2022-01-01 PROCEDURE — 3E0333Z INTRODUCTION OF ANTI-INFLAMMATORY INTO PERIPHERAL VEIN, PERCUTANEOUS APPROACH: ICD-10-PCS | Performed by: HOSPITALIST

## 2022-01-01 RX ORDER — DEXAMETHASONE SODIUM PHOSPHATE 4 MG/ML
6 INJECTION, SOLUTION INTRA-ARTICULAR; INTRALESIONAL; INTRAMUSCULAR; INTRAVENOUS; SOFT TISSUE EVERY 24 HOURS
Status: DISCONTINUED | OUTPATIENT
Start: 2022-01-01 | End: 2022-01-01

## 2022-01-01 RX ORDER — HYDROMORPHONE HCL/PF 1 MG/ML
0.5 SYRINGE (ML) INJECTION EVERY 4 HOURS
Status: DISCONTINUED | OUTPATIENT
Start: 2022-01-01 | End: 2022-01-01

## 2022-01-01 RX ORDER — SODIUM PHOSPHATE, DIBASIC AND SODIUM PHOSPHATE, MONOBASIC 7; 19 G/133ML; G/133ML
1 ENEMA RECTAL DAILY PRN
Status: DISCONTINUED | OUTPATIENT
Start: 2022-01-01 | End: 2022-01-01 | Stop reason: HOSPADM

## 2022-01-01 RX ORDER — LORAZEPAM 2 MG/ML
INJECTION INTRAMUSCULAR
Status: COMPLETED
Start: 2022-01-01 | End: 2022-01-01

## 2022-01-01 RX ORDER — SODIUM CHLORIDE 9 MG/ML
50 INJECTION, SOLUTION INTRAVENOUS CONTINUOUS
Status: DISCONTINUED | OUTPATIENT
Start: 2022-01-01 | End: 2022-01-01

## 2022-01-01 RX ORDER — LORAZEPAM 2 MG/ML
1 INJECTION INTRAMUSCULAR EVERY 4 HOURS
Status: DISCONTINUED | OUTPATIENT
Start: 2022-01-01 | End: 2022-01-01

## 2022-01-01 RX ORDER — LORAZEPAM 2 MG/ML
0.5 INJECTION INTRAMUSCULAR EVERY 4 HOURS PRN
Status: DISCONTINUED | OUTPATIENT
Start: 2022-01-01 | End: 2022-01-01

## 2022-01-01 RX ORDER — LORAZEPAM 2 MG/ML
1 INJECTION INTRAMUSCULAR EVERY 4 HOURS PRN
Status: DISCONTINUED | OUTPATIENT
Start: 2022-01-01 | End: 2022-01-01

## 2022-01-01 RX ORDER — OLANZAPINE 2.5 MG/1
2.5 TABLET ORAL
Qty: 90 TABLET | Refills: 0 | Status: SHIPPED | OUTPATIENT
Start: 2022-01-01 | End: 2022-01-01

## 2022-01-01 RX ORDER — LORAZEPAM 2 MG/ML
2 INJECTION INTRAMUSCULAR
Status: DISCONTINUED | OUTPATIENT
Start: 2022-01-01 | End: 2022-01-01 | Stop reason: HOSPADM

## 2022-01-01 RX ORDER — BISACODYL 10 MG
10 SUPPOSITORY, RECTAL RECTAL DAILY PRN
Status: DISCONTINUED | OUTPATIENT
Start: 2022-01-01 | End: 2022-01-01 | Stop reason: HOSPADM

## 2022-01-01 RX ORDER — SODIUM PHOSPHATE, DIBASIC AND SODIUM PHOSPHATE, MONOBASIC 7; 19 G/133ML; G/133ML
1 ENEMA RECTAL DAILY PRN
Status: DISCONTINUED | OUTPATIENT
Start: 2022-01-01 | End: 2022-01-01

## 2022-01-01 RX ORDER — LORAZEPAM 2 MG/ML
2 INJECTION INTRAMUSCULAR EVERY 4 HOURS
Status: DISCONTINUED | OUTPATIENT
Start: 2022-01-01 | End: 2022-01-01

## 2022-01-01 RX ORDER — HYDROMORPHONE HCL IN WATER/PF 6 MG/30 ML
0.2 PATIENT CONTROLLED ANALGESIA SYRINGE INTRAVENOUS EVERY 4 HOURS PRN
Status: DISCONTINUED | OUTPATIENT
Start: 2022-01-01 | End: 2022-01-01

## 2022-01-01 RX ORDER — AZITHROMYCIN 250 MG/1
TABLET, FILM COATED ORAL
Qty: 6 TABLET | Refills: 0 | Status: SHIPPED | OUTPATIENT
Start: 2022-01-01 | End: 2022-01-01

## 2022-01-01 RX ORDER — SCOLOPAMINE TRANSDERMAL SYSTEM 1 MG/1
1 PATCH, EXTENDED RELEASE TRANSDERMAL
Status: DISCONTINUED | OUTPATIENT
Start: 2022-01-01 | End: 2022-01-01 | Stop reason: HOSPADM

## 2022-01-01 RX ORDER — HYDROMORPHONE HCL IN WATER/PF 6 MG/30 ML
0.2 PATIENT CONTROLLED ANALGESIA SYRINGE INTRAVENOUS EVERY 4 HOURS
Status: DISCONTINUED | OUTPATIENT
Start: 2022-01-01 | End: 2022-01-01

## 2022-01-01 RX ORDER — CEFEPIME HYDROCHLORIDE 2 G/50ML
2000 INJECTION, SOLUTION INTRAVENOUS ONCE
Status: COMPLETED | OUTPATIENT
Start: 2022-01-01 | End: 2022-01-01

## 2022-01-01 RX ORDER — LORAZEPAM 2 MG/ML
0.5 INJECTION INTRAMUSCULAR ONCE
Status: COMPLETED | OUTPATIENT
Start: 2022-01-01 | End: 2022-01-01

## 2022-01-01 RX ADMIN — LORAZEPAM 1 MG: 2 INJECTION INTRAMUSCULAR; INTRAVENOUS at 07:25

## 2022-01-01 RX ADMIN — LORAZEPAM 2 MG: 2 INJECTION INTRAMUSCULAR at 14:49

## 2022-01-01 RX ADMIN — LORAZEPAM 1 MG: 2 INJECTION INTRAMUSCULAR; INTRAVENOUS at 01:14

## 2022-01-01 RX ADMIN — SODIUM CHLORIDE 1000 ML: 0.9 INJECTION, SOLUTION INTRAVENOUS at 02:11

## 2022-01-01 RX ADMIN — LORAZEPAM 0.5 MG: 2 INJECTION INTRAMUSCULAR; INTRAVENOUS at 17:49

## 2022-01-01 RX ADMIN — REMDESIVIR 100 MG: 100 INJECTION, POWDER, LYOPHILIZED, FOR SOLUTION INTRAVENOUS at 11:34

## 2022-01-01 RX ADMIN — IOHEXOL 70 ML: 350 INJECTION, SOLUTION INTRAVENOUS at 05:08

## 2022-01-01 RX ADMIN — MORPHINE SULFATE 2 MG: 2 INJECTION, SOLUTION INTRAMUSCULAR; INTRAVENOUS at 11:11

## 2022-01-01 RX ADMIN — MORPHINE SULFATE 2 MG: 2 INJECTION, SOLUTION INTRAMUSCULAR; INTRAVENOUS at 21:15

## 2022-01-01 RX ADMIN — LORAZEPAM 2 MG: 2 INJECTION INTRAMUSCULAR; INTRAVENOUS at 23:16

## 2022-01-01 RX ADMIN — LORAZEPAM 2 MG: 2 INJECTION INTRAMUSCULAR; INTRAVENOUS at 01:15

## 2022-01-01 RX ADMIN — LORAZEPAM 2 MG: 2 INJECTION INTRAMUSCULAR; INTRAVENOUS at 14:46

## 2022-01-01 RX ADMIN — HYDROMORPHONE HYDROCHLORIDE 0.5 MG: 1 INJECTION, SOLUTION INTRAMUSCULAR; INTRAVENOUS; SUBCUTANEOUS at 16:28

## 2022-01-01 RX ADMIN — LORAZEPAM 2 MG: 2 INJECTION INTRAMUSCULAR; INTRAVENOUS at 08:08

## 2022-01-01 RX ADMIN — MORPHINE SULFATE 2 MG: 2 INJECTION, SOLUTION INTRAMUSCULAR; INTRAVENOUS at 22:30

## 2022-01-01 RX ADMIN — SODIUM CHLORIDE 50 ML/HR: 0.9 INJECTION, SOLUTION INTRAVENOUS at 03:17

## 2022-01-01 RX ADMIN — LORAZEPAM 1 MG: 2 INJECTION INTRAMUSCULAR; INTRAVENOUS at 18:30

## 2022-01-01 RX ADMIN — HYDROMORPHONE HYDROCHLORIDE 0.2 MG: 0.2 INJECTION, SOLUTION INTRAMUSCULAR; INTRAVENOUS; SUBCUTANEOUS at 20:41

## 2022-01-01 RX ADMIN — MORPHINE SULFATE 2 MG: 2 INJECTION, SOLUTION INTRAMUSCULAR; INTRAVENOUS at 15:56

## 2022-01-01 RX ADMIN — SODIUM CHLORIDE 50 ML/HR: 0.9 INJECTION, SOLUTION INTRAVENOUS at 12:12

## 2022-01-01 RX ADMIN — SCOPALAMINE 1 PATCH: 1 PATCH, EXTENDED RELEASE TRANSDERMAL at 17:17

## 2022-01-01 RX ADMIN — DEXAMETHASONE SODIUM PHOSPHATE 6 MG: 4 INJECTION, SOLUTION INTRAMUSCULAR; INTRAVENOUS at 08:38

## 2022-01-01 RX ADMIN — LORAZEPAM 0.5 MG: 2 INJECTION INTRAMUSCULAR; INTRAVENOUS at 11:00

## 2022-01-01 RX ADMIN — HYDROMORPHONE HYDROCHLORIDE 0.2 MG: 0.2 INJECTION, SOLUTION INTRAMUSCULAR; INTRAVENOUS; SUBCUTANEOUS at 02:33

## 2022-01-01 RX ADMIN — REMDESIVIR 200 MG: 100 INJECTION, POWDER, LYOPHILIZED, FOR SOLUTION INTRAVENOUS at 12:42

## 2022-01-01 RX ADMIN — LORAZEPAM 2 MG: 2 INJECTION INTRAMUSCULAR; INTRAVENOUS at 05:31

## 2022-01-01 RX ADMIN — SODIUM CHLORIDE 1000 ML: 0.9 INJECTION, SOLUTION INTRAVENOUS at 04:00

## 2022-01-01 RX ADMIN — LORAZEPAM 1 MG: 2 INJECTION INTRAMUSCULAR; INTRAVENOUS at 00:02

## 2022-01-01 RX ADMIN — BISACODYL 10 MG: 10 SUPPOSITORY RECTAL at 07:25

## 2022-01-01 RX ADMIN — HYDROMORPHONE HYDROCHLORIDE 0.2 MG: 0.2 INJECTION, SOLUTION INTRAMUSCULAR; INTRAVENOUS; SUBCUTANEOUS at 16:18

## 2022-01-01 RX ADMIN — LORAZEPAM 2 MG: 2 INJECTION INTRAMUSCULAR; INTRAVENOUS at 14:49

## 2022-01-01 RX ADMIN — LORAZEPAM 2 MG: 2 INJECTION INTRAMUSCULAR; INTRAVENOUS at 20:58

## 2022-01-01 RX ADMIN — LORAZEPAM 2 MG: 2 INJECTION INTRAMUSCULAR; INTRAVENOUS at 17:02

## 2022-01-01 RX ADMIN — LORAZEPAM 0.5 MG: 2 INJECTION INTRAMUSCULAR; INTRAVENOUS at 13:41

## 2022-01-01 RX ADMIN — CEFEPIME HYDROCHLORIDE 2000 MG: 2 INJECTION, SOLUTION INTRAVENOUS at 04:03

## 2022-01-01 RX ADMIN — LORAZEPAM 2 MG: 2 INJECTION INTRAMUSCULAR; INTRAVENOUS at 19:04

## 2022-01-01 RX ADMIN — HYDROMORPHONE HYDROCHLORIDE 0.2 MG: 0.2 INJECTION, SOLUTION INTRAMUSCULAR; INTRAVENOUS; SUBCUTANEOUS at 00:40

## 2022-01-01 RX ADMIN — MORPHINE SULFATE 2 MG: 2 INJECTION, SOLUTION INTRAMUSCULAR; INTRAVENOUS at 08:39

## 2022-01-01 RX ADMIN — VANCOMYCIN HYDROCHLORIDE 750 MG: 750 INJECTION, SOLUTION INTRAVENOUS at 04:43

## 2022-01-01 RX ADMIN — LORAZEPAM 2 MG: 2 INJECTION INTRAMUSCULAR; INTRAVENOUS at 10:12

## 2022-01-01 RX ADMIN — LORAZEPAM 1 MG: 2 INJECTION INTRAMUSCULAR; INTRAVENOUS at 22:50

## 2022-01-01 RX ADMIN — HYDROMORPHONE HYDROCHLORIDE 0.2 MG: 0.2 INJECTION, SOLUTION INTRAMUSCULAR; INTRAVENOUS; SUBCUTANEOUS at 08:54

## 2022-01-01 RX ADMIN — HYDROMORPHONE HYDROCHLORIDE 0.5 MG: 1 INJECTION, SOLUTION INTRAMUSCULAR; INTRAVENOUS; SUBCUTANEOUS at 12:07

## 2022-01-01 RX ADMIN — LORAZEPAM 2 MG: 2 INJECTION INTRAMUSCULAR; INTRAVENOUS at 21:52

## 2022-01-01 RX ADMIN — HYDROMORPHONE HYDROCHLORIDE 0.2 MG: 0.2 INJECTION, SOLUTION INTRAMUSCULAR; INTRAVENOUS; SUBCUTANEOUS at 12:01

## 2022-01-01 RX ADMIN — LORAZEPAM 2 MG: 2 INJECTION INTRAMUSCULAR; INTRAVENOUS at 00:08

## 2022-01-01 RX ADMIN — HYDROMORPHONE HYDROCHLORIDE 0.2 MG: 0.2 INJECTION, SOLUTION INTRAMUSCULAR; INTRAVENOUS; SUBCUTANEOUS at 16:39

## 2022-01-01 RX ADMIN — MORPHINE SULFATE 2 MG: 2 INJECTION, SOLUTION INTRAMUSCULAR; INTRAVENOUS at 04:30

## 2022-01-01 RX ADMIN — HYDROMORPHONE HYDROCHLORIDE 0.2 MG: 0.2 INJECTION, SOLUTION INTRAMUSCULAR; INTRAVENOUS; SUBCUTANEOUS at 20:30

## 2022-01-01 RX ADMIN — LORAZEPAM 1 MG: 2 INJECTION INTRAMUSCULAR; INTRAVENOUS at 14:15

## 2022-01-01 RX ADMIN — LORAZEPAM 1 MG: 2 INJECTION INTRAMUSCULAR; INTRAVENOUS at 14:54

## 2022-01-01 RX ADMIN — MORPHINE SULFATE 2 MG: 2 INJECTION, SOLUTION INTRAMUSCULAR; INTRAVENOUS at 18:04

## 2022-01-01 RX ADMIN — LORAZEPAM 2 MG: 2 INJECTION INTRAMUSCULAR; INTRAVENOUS at 12:36

## 2022-01-01 RX ADMIN — SODIUM CHLORIDE 1000 ML: 0.9 INJECTION, SOLUTION INTRAVENOUS at 05:25

## 2022-01-01 RX ADMIN — MORPHINE SULFATE 2 MG: 2 INJECTION, SOLUTION INTRAMUSCULAR; INTRAVENOUS at 03:22

## 2022-01-01 RX ADMIN — LORAZEPAM 1 MG: 2 INJECTION INTRAMUSCULAR; INTRAVENOUS at 10:14

## 2022-01-01 RX ADMIN — MORPHINE SULFATE 2 MG: 2 INJECTION, SOLUTION INTRAMUSCULAR; INTRAVENOUS at 01:33

## 2022-01-01 RX ADMIN — MORPHINE SULFATE 2 MG: 2 INJECTION, SOLUTION INTRAMUSCULAR; INTRAVENOUS at 06:17

## 2022-01-01 RX ADMIN — BISACODYL 10 MG: 10 SUPPOSITORY RECTAL at 10:54

## 2022-01-01 RX ADMIN — LORAZEPAM 2 MG: 2 INJECTION INTRAMUSCULAR; INTRAVENOUS at 02:47

## 2022-01-01 RX ADMIN — LORAZEPAM 2 MG: 2 INJECTION INTRAMUSCULAR; INTRAVENOUS at 03:47

## 2022-01-01 RX ADMIN — LORAZEPAM 0.5 MG: 2 INJECTION INTRAMUSCULAR; INTRAVENOUS at 08:38

## 2022-01-01 RX ADMIN — LORAZEPAM 1 MG: 2 INJECTION INTRAMUSCULAR; INTRAVENOUS at 19:04

## 2022-01-01 RX ADMIN — LORAZEPAM 1 MG: 2 INJECTION INTRAMUSCULAR; INTRAVENOUS at 02:18

## 2022-01-01 RX ADMIN — LORAZEPAM 1 MG: 2 INJECTION INTRAMUSCULAR; INTRAVENOUS at 11:00

## 2022-01-01 RX ADMIN — LORAZEPAM 0.5 MG: 2 INJECTION INTRAMUSCULAR; INTRAVENOUS at 01:30

## 2022-01-01 RX ADMIN — HYDROMORPHONE HYDROCHLORIDE 0.2 MG: 0.2 INJECTION, SOLUTION INTRAMUSCULAR; INTRAVENOUS; SUBCUTANEOUS at 04:29

## 2022-01-01 RX ADMIN — LORAZEPAM 2 MG: 2 INJECTION INTRAMUSCULAR; INTRAVENOUS at 20:42

## 2022-01-01 RX ADMIN — MORPHINE SULFATE 2 MG: 2 INJECTION, SOLUTION INTRAMUSCULAR; INTRAVENOUS at 19:44

## 2022-01-01 RX ADMIN — MORPHINE SULFATE 2 MG: 2 INJECTION, SOLUTION INTRAMUSCULAR; INTRAVENOUS at 13:44

## 2022-01-01 RX ADMIN — MORPHINE SULFATE 2 MG: 2 INJECTION, SOLUTION INTRAMUSCULAR; INTRAVENOUS at 18:35

## 2022-01-01 RX ADMIN — MORPHINE SULFATE 2 MG: 2 INJECTION, SOLUTION INTRAMUSCULAR; INTRAVENOUS at 00:06

## 2022-01-01 RX ADMIN — MORPHINE SULFATE 2 MG: 2 INJECTION, SOLUTION INTRAMUSCULAR; INTRAVENOUS at 00:08

## 2022-01-01 RX ADMIN — LORAZEPAM 0.5 MG: 2 INJECTION INTRAMUSCULAR; INTRAVENOUS at 07:06

## 2022-01-01 RX ADMIN — MORPHINE SULFATE 2 MG: 2 INJECTION, SOLUTION INTRAMUSCULAR; INTRAVENOUS at 20:57

## 2022-01-01 RX ADMIN — LORAZEPAM 1 MG: 2 INJECTION INTRAMUSCULAR; INTRAVENOUS at 06:32

## 2022-01-19 NOTE — PATIENT INSTRUCTIONS
1  Recommend Podiatry services   2  Recommend Tylenol 650 mg every 6 hours as needed for pain  3  Recommend taking over the counter Pepcid 10-20 mg tablet daily before dinner  4   Follow-up in 3 months

## 2022-01-19 NOTE — PROGRESS NOTES
ASSESSMENT AND PLAN:  1  Anxiety  Assessment & Plan:  · Improved with olanzapine 2 5 mg at HS  · Continue Celexa, Namenda and Aricept  · Frequent reassurance, redirection and reorientation recommended  2  Slow transit constipation  Assessment & Plan:  · Improved with extra fiber supplement  · Continue Miralax as needed  · Exercise as tolerated      3  Chronic bilateral low back pain without sciatica  Assessment & Plan:  · Recommend Tylenol 650 mg every 6 hours prn for back pain  · May use heat alternating with ice for 20 minute intervals throughout the day  · Daily exercise as tolerated recommended      4  Mixed hyperlipidemia  Assessment & Plan:  · Continue pravastatin 40 mg daily  · Recommend patient refrain from junk foods and adhere to a heart healthy diet  · Regular exercise as tolerated     Orders:  -     CBC and differential; Future  -     Comprehensive metabolic panel; Future; Expected date: 01/26/2022  -     Lipid panel; Future    5  Thyroid disorder screening  -     TSH, 3rd generation with T4 reflex; Future    6  Encounter for vitamin deficiency screening    7  Obstructive sleep apnea syndrome  Assessment & Plan:  · Continue CPAP at HS  · Follow-up with sleep medicine      8  Dementia with behavioral disturbance, unspecified dementia type [facility])  Assessment & Plan:  · Patient with severe dementia  · Wife is in the process of arranging to have more help in the home  · Continue olanzapine 2 5 mg at bedtime, Celexa 40 mg daily  · Continue Namenda and Aricept  · Wife encouraged to have patient enroll in a senior  program several hours a day, several times per week  · Will continue to manage chronic conditions  · Recommend patient stay mentally, physically and socially engaged as tolerated  · Maintain fall precautions  · Wife continues to follow-up with psychotherapy for caregiver support          9  Cough  Assessment & Plan:  · Chronic cough reported that is worse at night  · Recommend taking Pepcid 10 to 20 mg daily before dinner  · Continue Robitussin DM TID prn  · Will continue to monitor  · Follow-up in 3 months or sooner if symptoms worsen      10  Conductive hearing loss, unspecified laterality  Assessment & Plan:  · Continue use of hearing aid and face patient directly in close proximity when communicating with patient to avoid confusion  HPI:    Agata Palm is a 74year old male patient with underlying severe Alzheimer's dementia, mixed hyperlipidemia, anxiety, constipation, DREW seen and examined today to follow-up on acute and chronic medical conditions with his wife present  Due to the severity of patient's dementia, history is obtained by wife  Wife states that the patient has some good days and some days not as good  She is looking into extra help at home  A home health aide for 6  hours during the day through [facility]  Wife reports that the patient has not suffered any recent falls  He needs assistance with all ADL's and IADLs  He is experiencing on and off constipation that is relieved with an extra fiber tablet  His wife states that he eats mainly junk foods and iced tea  He is having issues urinating when going out to places  No wandering reported, wife states that they have a security system in place  Patient is reported to be sleeping through the night  He goes to bed around 8:30 pm  During the day he follows his wife around  He often gets frustrated and gets more confused at night  He does not recognize his wife or family members at times  He gets anxious when he has to go out to appointments  He recently lost his mother who was 80years old die to dementia  He was able to go to the services without increased anxiety  ROS: Review of Systems   Unable to perform ROS: Dementia (ROS limited and assisted by wife due to dementia)   HENT: Positive for hearing loss  Respiratory: Positive for cough and wheezing  Negative for shortness of breath  Coughing every night  Brings up a little mucous at times  Occasional wheezing   Cardiovascular: Negative for chest pain  Gastrointestinal: Positive for constipation  Negative for abdominal pain, nausea and vomiting  Genitourinary: Negative for dysuria and hematuria  Musculoskeletal: Negative for gait problem  Neurological: Negative for dizziness and headaches  Psychiatric/Behavioral: Positive for confusion and dysphoric mood  Negative for behavioral problems, hallucinations and sleep disturbance  The patient is nervous/anxious           Allergies   Allergen Reactions    Quetiapine Confusion     Became more confused with 12 5 mg quetiapine therapy    Fish Oil + D3 [Fish Oil-Cholecalciferol - Food Allergy] Rash    Gabapentin Drowsiness    Oseltamivir Rash       Medications:    Current Outpatient Medications on File Prior to Visit   Medication Sig Dispense Refill    citalopram (CeleXA) 40 mg tablet Take 1 tablet (40 mg total) by mouth daily 90 tablet 3    dextromethorphan-guaiFENesin (ROBITUSSIN DM)  mg/5 mL syrup Take 5 mL by mouth 3 (three) times a day as needed for cough 236 mL 0    donepezil (ARICEPT) 10 mg tablet Take 1 tablet (10 mg total) by mouth 2 (two) times a day (Patient taking differently: Take 10 mg by mouth 2 (two) times a day  ) 180 tablet 3    Flaxseed, Linseed, (FLAX SEED OIL) 1300 MG CAPS Take by mouth      fluticasone (FLONASE) 50 mcg/act nasal spray SPRAY 2 SPRAYS INTO EACH NOSTRIL EVERY DAY 48 mL 1    ibuprofen (MOTRIN) 200 mg tablet Take 400 mg by mouth 3 (three) times a day as needed for pain      memantine (NAMENDA) 10 mg tablet Take 1 tablet (10 mg total) by mouth 2 (two) times a day 180 tablet 2    Multiple Vitamin ([facility] DAILY MULTIPLE PO) Take by mouth      OLANZapine (ZyPREXA) 2 5 mg tablet Take 1 tablet (2 5 mg total) by mouth daily at bedtime 90 tablet 1    psyllium (METAMUCIL) 0 52 g capsule Take 0 52 g by mouth daily      polyethylene glycol (GLYCOLAX) 17 GM/SCOOP powder Take 17 g by mouth daily as needed (constipation) 510 g 5    pravastatin (PRAVACHOL) 40 mg tablet Take 1 tablet (40 mg total) by mouth daily 90 tablet 3     No current facility-administered medications on file prior to visit  History:  Past Medical History:   Diagnosis Date    Anxiety     BPH with obstruction/lower urinary tract symptoms     LAST ASSESSED: 9/15/14    Colon polyp     Dementia ([facility] [address] )     Depression     Hx of laceration of skin     LAST ASSESSED: 11/3/14    Hyperlipidemia     Memory loss     Pneumonia     LAST ASSESSED: 14    Sleep apnea     Tachycardia     LAST ASSESSED: 3/8/14    Thrombophlebitis of superficial veins of upper extremities     LAST ASSESSED: 57    Uncomplicated alcohol abuse     LAST ASSESSED: 2/28/15    Weakness of both arms     LAST ASSESSED: 14    Weakness of both legs     LAST ASSESSED: 14     Past Surgical History:   Procedure Laterality Date    COLONOSCOPY      COMPLETE    HERNIA REPAIR      SKIN LESION EXCISION      chest melanoma     Family History   Problem Relation Age of Onset    Arthritis Mother     Hyperlipidemia Mother     Dementia Mother         started late 76s    Tinnitus Mother     Hypertension Mother     Arthritis Family     Hyperlipidemia Family     Anxiety disorder Sister      Social History     Socioeconomic History    Marital status: /Civil Union     Spouse name: Not on file    Number of children: 3    Years of education: 12+    Highest education level: High school graduate   Occupational History    Occupation: retired   Tobacco Use    Smoking status: Former Smoker     Types: Cigarettes     Quit date:      Years since quittin 0    Smokeless tobacco: Never Used    Tobacco comment: quit 20 years ago   Vaping Use    Vaping Use: Never used   Substance and Sexual Activity    Alcohol use:  Yes    Drug use: No    Sexual activity: Yes     Partners: Female   Other Topics Concern    Not on file   Social History Narrative    Not on file     Social Determinants of Health     Financial Resource Strain: High Risk    Difficulty of Paying Living Expenses: Very hard   Food Insecurity: Food Insecurity Present    Worried About Running Out of Food in the Last Year: Often true    Dion of Food in the Last Year: Never true   Transportation Needs: No Transportation Needs    Lack of Transportation (Medical): No    Lack of Transportation (Non-Medical): No   Physical Activity: Not on file   Stress: Not on file   Social Connections: Unknown    Frequency of Communication with Friends and Family: Never    Frequency of Social Gatherings with Friends and Family: Once a week    Attends Yazidism Services: 1 to 4 times per year    Active Member of [employer] Group or Organizations: Yes    Attends Club or Organization Meetings: 1 to 4 times per year    Marital Status: Not on file   Intimate Partner Violence: Not At Risk    Fear of Current or Ex-Partner: No    Emotionally Abused: No    Physically Abused: No    Sexually Abused: No   Housing Stability: Not on file     Past Surgical History:   Procedure Laterality Date    COLONOSCOPY      COMPLETE    HERNIA REPAIR      SKIN LESION EXCISION      chest melanoma       OBJECTIVE:  Vitals:    01/19/22 1056   BP: 110/82   BP Location: Left arm   Patient Position: Sitting   Cuff Size: Adult   Pulse: 78   Resp: 12   Temp: 97 9 °F (36 6 °C)   TempSrc: Temporal   SpO2: 94%   Weight: 72 8 kg (160 lb 9 6 oz)   Height: 5' 9" (1 753 m)     Body mass index is 23 72 kg/m²  Physical Exam  Constitutional:       General: He is not in acute distress  Appearance: Normal appearance  He is normal weight  He is not ill-appearing or toxic-appearing  HENT:      Head: Normocephalic and atraumatic  Right Ear: Tympanic membrane, ear canal and external ear normal  There is no impacted cerumen        Left Ear: Tympanic membrane, ear canal and external ear normal  There is no impacted cerumen  Nose: Nose normal  No congestion or rhinorrhea  Mouth/Throat:      Mouth: Mucous membranes are moist       Pharynx: Oropharynx is clear  No oropharyngeal exudate or posterior oropharyngeal erythema  Eyes:      General: No scleral icterus  Right eye: No discharge  Left eye: No discharge  Extraocular Movements: Extraocular movements intact  Conjunctiva/sclera: Conjunctivae normal    Cardiovascular:      Rate and Rhythm: Normal rate and regular rhythm  Pulses: Normal pulses  Heart sounds: Murmur heard  Pulmonary:      Effort: Pulmonary effort is normal  No respiratory distress  Breath sounds: Normal breath sounds  No wheezing, rhonchi or rales  Abdominal:      General: Bowel sounds are normal  There is no distension  Palpations: Abdomen is soft  Tenderness: There is no abdominal tenderness  There is no guarding  Hernia: No hernia is present  Musculoskeletal:         General: No tenderness or signs of injury  Normal range of motion  Cervical back: Normal range of motion and neck supple  No rigidity  Right lower leg: No edema  Left lower leg: No edema  Lymphadenopathy:      Cervical: No cervical adenopathy  Skin:     General: Skin is warm and dry  Coloration: Skin is not jaundiced  Findings: No bruising, erythema or lesion  Neurological:      General: No focal deficit present  Mental Status: He is alert  Mental status is at baseline  Motor: No weakness        Gait: Gait normal       Comments: Alert and oriented to person, disoriented to time and place   Psychiatric:         Mood and Affect: Mood normal          Behavior: Behavior normal          Labs & Imaging:  Lab Results   Component Value Date    WBC 4 40 04/19/2021    HGB 14 8 04/19/2021    HCT 44 4 04/19/2021    MCV 90 04/19/2021     04/19/2021     Lab Results   Component Value Date    SODIUM 138 04/19/2021    K 4 3 04/19/2021     04/19/2021    CO2 28 04/19/2021    BUN 18 04/19/2021    CREATININE 1 05 04/19/2021    GLUC 69 06/11/2020    CALCIUM 9 1 04/19/2021     Lab Results   Component Value Date    MFUQJWBM75 1,996 (H) 06/11/2020     Lab Results   Component Value Date    MQZ8TQVEHUPC 1 760 06/11/2020     Lab Results   Component Value Date    GCWV93XHKYDI 55 8 06/11/2020      No results found for this or any previous visit

## 2022-01-20 NOTE — PROGRESS NOTES
Daily Note     Today's date: 2022  Patient name: Boston TomasB: 1955  MRN: [id number]  Referring provider: Arianne Benoit MD  Dx: No diagnosis found  Start Time: 1000  Stop Time: 1100  Total time in clinic (min): 60 minutes    Subjective: "I don't know what your talking about"  Objective: See treatment diary below    Pt participated in skilled OT focusing on verbal direction follow, attention, constructional skills and pattern recognition to maintain cognitive abilities for safety during fxl transfers and mobility and relieve level of caregiver burden while preventing further cognitive decline  Pt engaged in constructional skills activity w/1-2 step verbal direction follow matching colored wooden beads to template focusing on color identification, shape recognition and task sequencing  Assessment: Tolerated treatment fair  Pt required >40% physical and verbal cues  for sequencing and direction follow  Pt attended to task w/1:1 assist,  unable to follow through indep   Patient would benefit from continued OT    Plan: Continued skilled OT per POC     INTERVENTION COMMENTS:  Diagnosis: [facility] with behavioral disturbance, [facility] [F03 [facility]]  Precautions: AD, back pain, shoulder pain  FOTO: n/a  Insurance: Payor: Arnulfo Britt / Plan: MEDICARE A AND B / Product Type: Medicare A & B Fee for Service /   8 of 10 visits, PN due

## 2022-01-23 NOTE — ASSESSMENT & PLAN NOTE
· Improved with olanzapine 2 5 mg at HS  · Continue Celexa, Namenda and Aricept  · Frequent reassurance, redirection and reorientation recommended

## 2022-01-23 NOTE — ASSESSMENT & PLAN NOTE
· Chronic cough reported that is worse at night  · Recommend taking Pepcid 10 to 20 mg daily before dinner  · Continue Robitussin DM TID prn  · Will continue to monitor  · Follow-up in 3 months or sooner if symptoms worsen

## 2022-01-23 NOTE — ASSESSMENT & PLAN NOTE
· Continue use of hearing aid and face patient directly in close proximity when communicating with patient to avoid confusion

## 2022-01-23 NOTE — ASSESSMENT & PLAN NOTE
· Patient with severe dementia  · Wife is in the process of arranging to have more help in the home  · Continue olanzapine 2 5 mg at bedtime, Celexa 40 mg daily  · Continue Namenda and Aricept  · Wife encouraged to have patient enroll in a senior  program several hours a day, several times per week  · Will continue to manage chronic conditions  · Recommend patient stay mentally, physically and socially engaged as tolerated  · Maintain fall precautions  · Wife continues to follow-up with psychotherapy for caregiver support

## 2022-01-23 NOTE — ASSESSMENT & PLAN NOTE
· Recommend Tylenol 650 mg every 6 hours prn for back pain  · May use heat alternating with ice for 20 minute intervals throughout the day     · Daily exercise as tolerated recommended

## 2022-01-23 NOTE — ASSESSMENT & PLAN NOTE
· Continue pravastatin 40 mg daily  · Recommend patient refrain from junk foods and adhere to a heart healthy diet     · Regular exercise as tolerated

## 2022-02-17 NOTE — TELEPHONE ENCOUNTER
Spoke with pt wife relayed results, that have been reviewed by Dr Shawn Shaver       ----- Message from Daniel Kumar MD sent at 2/16/2022  3:48 PM EST -----  Pls  let pt's wife know his TB test is negative

## 2022-03-14 NOTE — PROGRESS NOTES
[facility]        NAME: Boston Turner is a 69 y o  male  : 1955    MRN: [id number]  DATE: 2022  TIME: 5:16 PM    Assessment and Plan   Cough [R05 9]  1  Cough  azithromycin (ZITHROMAX) 250 mg tablet         Patient Instructions     Cough  zithromax as directed  Follow up with PCP in 3-5 days  Proceed to  ER if symptoms worsen  Chief Complaint     Chief Complaint   Patient presents with    Cough     Pt  with a cough for a month  History of Present Illness       78-year-old male brought in by wife complaining of cough that is nonproductive but what x2 months  Wife states that the cough has gotten more with an he appears to have difficulty bringing anything up over the last week  Denies fevers, chills, shortness of breath      Review of Systems   Review of Systems   Constitutional: Negative  HENT: Positive for congestion  Negative for dental problem, drooling, ear pain, postnasal drip, rhinorrhea, sinus pain, sore throat, trouble swallowing and voice change  Eyes: Negative  Respiratory: Positive for cough  Negative for apnea, choking, chest tightness, shortness of breath, wheezing and stridor  Cardiovascular: Negative  Negative for chest pain           Current Medications       Current Outpatient Medications:     citalopram (CeleXA) 40 mg tablet, Take 1 tablet (40 mg total) by mouth daily, Disp: 90 tablet, Rfl: 3    donepezil (ARICEPT) 10 mg tablet, Take 1 tablet (10 mg total) by mouth 2 (two) times a day (Patient taking differently: Take 10 mg by mouth 2 (two) times a day  ), Disp: 180 tablet, Rfl: 3    Flaxseed, Linseed, (FLAX SEED OIL) 1300 MG CAPS, Take by mouth, Disp: , Rfl:     fluticasone (FLONASE) 50 mcg/act nasal spray, SPRAY 2 SPRAYS INTO EACH NOSTRIL EVERY DAY, Disp: 48 mL, Rfl: 1    ibuprofen (MOTRIN) 200 mg tablet, Take 400 mg by mouth 3 (three) times a day as needed for pain, Disp: , Rfl:     memantine (NAMENDA) 10 mg tablet, Take 1 tablet (10 mg total) by mouth 2 (two) times a day, Disp: 180 tablet, Rfl: 2    Multiple Vitamin ([facility]S DAILY MULTIPLE PO), Take by mouth, Disp: , Rfl:     OLANZapine (ZyPREXA) 2 5 mg tablet, Take 1 tablet (2 5 mg total) by mouth daily at bedtime, Disp: 90 tablet, Rfl: 1    psyllium (METAMUCIL) 0 52 g capsule, Take 0 52 g by mouth daily, Disp: , Rfl:     azithromycin (ZITHROMAX) 250 mg tablet, Take 2 tablets today then 1 tablet daily x 4 days, Disp: 6 tablet, Rfl: 0    dextromethorphan-guaiFENesin (ROBITUSSIN DM)  mg/5 mL syrup, Take 5 mL by mouth 3 (three) times a day as needed for cough (Patient not taking: Reported on 3/14/2022 ), Disp: 236 mL, Rfl: 0    polyethylene glycol (GLYCOLAX) 17 GM/SCOOP powder, Take 17 g by mouth daily as needed (constipation), Disp: 510 g, Rfl: 5    pravastatin (PRAVACHOL) 40 mg tablet, Take 1 tablet (40 mg total) by mouth daily, Disp: 90 tablet, Rfl: 3    Current Allergies     Allergies as of 03/14/2022 - Reviewed 03/14/2022   Allergen Reaction Noted    Quetiapine Confusion 09/07/2021    Fish oil + d3 [fish oil-cholecalciferol - food allergy] Rash 02/27/2014    Gabapentin Drowsiness 07/15/2021    Oseltamivir Rash 02/27/2014            The following portions of the patient's history were reviewed and updated as appropriate: allergies, current medications, past family history, past medical history, past social history, past surgical history and problem list      Past Medical History:   Diagnosis Date    Anxiety     BPH with obstruction/lower urinary tract symptoms     LAST ASSESSED: 9/15/14    Colon polyp     Dementia ([facility] [address] )     Depression     Hx of laceration of skin     LAST ASSESSED: 11/3/14    Hyperlipidemia     Memory loss     Pneumonia     LAST ASSESSED: 5/12/14    Sleep apnea     Tachycardia     LAST ASSESSED: 3/8/14    Thrombophlebitis of superficial veins of upper extremities     LAST ASSESSED: 6/1/51    Uncomplicated alcohol abuse     LAST ASSESSED: 2/28/15  Weakness of both arms     LAST ASSESSED: 5/12/14    Weakness of both legs     LAST ASSESSED: 5/12/14       Past Surgical History:   Procedure Laterality Date    COLONOSCOPY      COMPLETE    HERNIA REPAIR      SKIN LESION EXCISION      chest melanoma       Family History   Problem Relation Age of Onset    Arthritis Mother     Hyperlipidemia Mother     Dementia Mother         started late 76s    Tinnitus Mother     Hypertension Mother     Arthritis Family     Hyperlipidemia Family     Anxiety disorder Sister          Medications have been verified  Objective   /72   Pulse 84   Temp 98 2 °F (36 8 °C)   Resp 18   Ht 5' 7" (1 702 m)   Wt 71 7 kg (158 lb)   SpO2 94%   BMI 24 75 kg/m²        Physical Exam     Physical Exam  Constitutional:       General: He is not in acute distress  Appearance: Normal appearance  He is well-developed and normal weight  He is not diaphoretic  HENT:      Head: Normocephalic and atraumatic  Right Ear: Hearing, tympanic membrane, ear canal and external ear normal       Left Ear: Hearing, tympanic membrane, ear canal and external ear normal       Nose: Rhinorrhea present  Right Sinus: No maxillary sinus tenderness or frontal sinus tenderness  Left Sinus: No maxillary sinus tenderness or frontal sinus tenderness  Mouth/Throat:      Pharynx: Uvula midline  Cardiovascular:      Rate and Rhythm: Normal rate and regular rhythm  Heart sounds: Normal heart sounds  Pulmonary:      Effort: Pulmonary effort is normal  No respiratory distress  Breath sounds: Normal breath sounds  No stridor  No wheezing, rhonchi or rales  Chest:      Chest wall: No tenderness  Musculoskeletal:      Cervical back: Normal range of motion and neck supple  Lymphadenopathy:      Cervical: No cervical adenopathy  Neurological:      Mental Status: He is alert

## 2022-03-14 NOTE — PATIENT INSTRUCTIONS
Cough  zithromax as directed  Humidifier in room, steam from shower  Follow up with PCP in 3-5 days  Proceed to  ER if symptoms worsen  Acute Cough   WHAT YOU NEED TO KNOW:   An acute cough can last up to 3 weeks  Common causes of an acute cough include a cold, allergies, or a lung infection  DISCHARGE INSTRUCTIONS:   Return to the emergency department if:   · You have trouble breathing or feel short of breath  · You cough up blood, or you see blood in your mucus  · You faint or feel weak or dizzy  · You have chest pain when you cough or take a deep breath  · You have new wheezing  Contact your healthcare provider if:   · You have a fever  · Your cough lasts longer than 4 weeks  · Your symptoms do not improve with treatment  · You have questions or concerns about your condition or care  Medicines:   · Medicines  may be needed to stop the cough, decrease swelling in your airways, or help open your airways  Medicine may also be given to help you cough up mucus  Ask your healthcare provider what over-the-counter medicines you can take  If you have an infection caused by bacteria, you may need antibiotics  · Take your medicine as directed  Contact your healthcare provider if you think your medicine is not helping or if you have side effects  Tell him or her if you are allergic to any medicine  Keep a list of the medicines, vitamins, and herbs you take  Include the amounts, and when and why you take them  Bring the list or the pill bottles to follow-up visits  Carry your medicine list with you in case of an emergency  Manage your symptoms:   · Do not smoke and stay away from others who smoke  Nicotine and other chemicals in cigarettes and cigars can cause lung damage and make your cough worse  Ask your healthcare provider for information if you currently smoke and need help to quit  E-cigarettes or smokeless tobacco still contain nicotine   Talk to your healthcare provider before you use these products  · Drink extra liquids as directed  Liquids will help thin and loosen mucus so you can cough it up  Liquids will also help prevent dehydration  Examples of good liquids to drink include water, fruit juice, and broth  Do not drink liquids that contain caffeine  Caffeine can increase your risk for dehydration  Ask your healthcare provider how much liquid to drink each day  · Rest as directed  Do not do activities that make your cough worse, such as exercise  · Use a humidifier or vaporizer  Use a cool mist humidifier or a vaporizer to increase air moisture in your home  This may make it easier for you to breathe and help decrease your cough  · Eat 2 to 5 mL of honey 2 times each day  Honey can help thin mucus and decrease your cough  · Use cough drops or lozenges  These can help decrease throat irritation and your cough  Follow up with your healthcare provider as directed:  Write down your questions so you remember to ask them during your visits  © [employer] 2022 Information is for End User's use only and may not be sold, redistributed or otherwise used for commercial purposes  All illustrations and images included in CareNotes® are the copyrighted property of [employer]  or [facility]   The above information is an  only  It is not intended as medical advice for individual conditions or treatments  Talk to your doctor, nurse or pharmacist before following any medical regimen to see if it is safe and effective for you

## 2022-03-14 NOTE — TELEPHONE ENCOUNTER
Pt wife aristides called stating pt continues to have on going coughing fist with a lot of phlegm, Destinee Khan has not been sleeping at night  Pt wife also states the coughing is causing pt to have breathing difficulty  Geremiasyasmeen Arun states that she will take pt to Urgent care due the  breathing difficulty  Pt wife would like to have sooner appt with Dr Shawn Shaver on a Monday or Friday if appt becomes available

## 2022-03-22 NOTE — TELEPHONE ENCOUNTER
Please call the patient's wife and inquire if the patient had any bowel movements at the ? Can she verify this? If he is constipated,  and has not had a bowel movement in more than 3 days, she should try a Dulcolax suppository and start senna-s nightly  Message above states the patient is not able to pass urine    If he has not passed urine in 24 hours and has urinary retention then he needs to go to the ER    We can schedule a virtual visit with Alonso Plata

## 2022-03-22 NOTE — TELEPHONE ENCOUNTER
Called pt's wife and relayed Dr Yolande Ponce message and recommendation  Iesha Reina relayed that she is unaware if he had a bowel movement at  as she is not sure if they go with him to restroom  She stated that she will give it until tonight to see but will  the OTC medications     Did schedule appt with [address] [place] [address] 3/23 at 3:30pm

## 2022-03-22 NOTE — TELEPHONE ENCOUNTER
Patient's spouse, Dwayne Cheadle ([phone]) called to say patient is constipated and is frequently going to the bathroom with the urge to urinate, but is unable to void  Lee Rahman was unable to recall when patient last had a bowel movement  Patient is not sleeping well at night even though he does not nap/sleep during the day  When he gets up at night he is roaming through the house  Patient is attending  three days per week anywhere from to 6-8 hours each day

## 2022-03-23 NOTE — PATIENT INSTRUCTIONS
1  Please obtain urine culture with next blood work  2  May increase Zyprexa to 2 5 mg two times daily  3   Follow-up with Dr Haydee Ham

## 2022-03-23 NOTE — PROGRESS NOTES
Virtual Regular Visit    Verification of patient location:    Patient is located in the following state in which I hold an active license [place]      Assessment/Plan:    Problem List Items Addressed This Visit        Digestive    Slow transit constipation - Primary     · Constipation reported that has improved with fiber supplementation  · Continue Miralax as needed  · Continue adequate fluid hydration and regular exercise as tolerated  Nervous and Auditory    Dementia with behavioral disturbance ([facility] [address] )     · Patient with severe dementia  · Patient now has caregiver support  · Continue olanzapine 2 5 mg at bedtime, Celexa 40 mg daily  · May give an extra dose of olanzapine 2 5 mg as needed after day care  · Continue Namenda and Aricept  · Continue Adult day care 3 days per week  · Will continue to manage chronic conditions  · Recommend patient stay mentally, physically and socially engaged as tolerated  · Maintain fall precautions  · Wife continues to follow-up with psychotherapy for caregiver support                Other    Cough     · Chronic cough reported that is worse at night  · Recommend taking Pepcid 10 to 20 mg daily before dinner at last office visit  · May take over the counter robitussin or mucinex as directed prn  · Will continue to monitor           Symptoms involving urinary system     · Will order UAC&S due to urinary frequency  · Adequate fluid hydration encouraged         Relevant Orders    UA (URINE) with reflex to Scope    Urine culture               Reason for visit is   Chief Complaint   Patient presents with    Virtual Regular Visit        Encounter provider CATHRYN Harrison    Provider located at [address] [place] [address]  [phone]      Recent Visits  Date Type Provider Dept   03/23/22 Telemedicine Billy Harrison   03/22/22 Telephone Senior Care  [facility]   Showing recent visits within past 7 days and meeting all other requirements  Future Appointments  No visits were found meeting these conditions  Showing future appointments within next 150 days and meeting all other requirements       The patient was identified by name and date of birth  Maurilio Conception was informed that this is a telemedicine visit and that the visit is being conducted through [employer] and patient was informed that this is a secure, HIPAA-compliant platform  He agrees to proceed     My office door was closed  The patient was notified the following individuals were present in the room [employer], [employer]  Wife acknowledged consent and understanding of privacy and security of the video platform  The patient has agreed to participate and understands they can discontinue the visit at any time  Wife (POA) gave consent due to history of dementia  Patient is aware this is a billable service  Subjective  Baird Conception is a 69 y o  male seen and examined today for complaint of difficulty urinating and constipation with wife and caregiver present  Wife states that the patient has had decreased urination that has improved with fluid hydration  He does complain of dysuria at times that is believed to be from not wiping properly after a BM  Wife states that the patient goes to "therapy" (Adult Day Care), 3 days per week for 6 to 8 hours  He is very tired when he gets home but he will not sleep  He is unable to relax during the day and frequently becomes aggravated           Past Medical History:   Diagnosis Date    Anxiety     BPH with obstruction/lower urinary tract symptoms     LAST ASSESSED: 9/15/14    Colon polyp     Dementia ([facility] [address] )     Depression     Hx of laceration of skin     LAST ASSESSED: 11/3/14    Hyperlipidemia     Memory loss     Pneumonia     LAST ASSESSED: 5/12/14    Sleep apnea     Tachycardia     LAST ASSESSED: 3/8/14    Thrombophlebitis of superficial veins of upper extremities     LAST ASSESSED: 3/0/39    Uncomplicated alcohol abuse     LAST ASSESSED: 2/28/15    Weakness of both arms     LAST ASSESSED: 5/12/14    Weakness of both legs     LAST ASSESSED: 5/12/14       Past Surgical History:   Procedure Laterality Date    COLONOSCOPY      COMPLETE    HERNIA REPAIR      SKIN LESION EXCISION      chest melanoma       Current Outpatient Medications   Medication Sig Dispense Refill    citalopram (CeleXA) 40 mg tablet Take 1 tablet (40 mg total) by mouth daily 90 tablet 3    donepezil (ARICEPT) 10 mg tablet Take 1 tablet (10 mg total) by mouth 2 (two) times a day (Patient taking differently: Take 10 mg by mouth 2 (two) times a day  ) 180 tablet 3    Flaxseed, Linseed, (FLAX SEED OIL) 1300 MG CAPS Take by mouth      fluticasone (FLONASE) 50 mcg/act nasal spray SPRAY 2 SPRAYS INTO EACH NOSTRIL EVERY DAY 48 mL 1    ibuprofen (MOTRIN) 200 mg tablet Take 400 mg by mouth 3 (three) times a day as needed for pain      memantine (NAMENDA) 10 mg tablet Take 1 tablet (10 mg total) by mouth 2 (two) times a day 180 tablet 2    Multiple Vitamin ([facility] DAILY MULTIPLE PO) Take by mouth      OLANZapine (ZyPREXA) 2 5 mg tablet Take 1 tablet (2 5 mg total) by mouth daily at bedtime (Patient taking differently: Take 2 5 mg by mouth 2 (two) times a day  ) 90 tablet 0    psyllium (METAMUCIL) 0 52 g capsule Take 0 52 g by mouth daily      dextromethorphan-guaiFENesin (ROBITUSSIN DM)  mg/5 mL syrup Take 5 mL by mouth 3 (three) times a day as needed for cough (Patient not taking: Reported on 3/14/2022 ) 236 mL 0    polyethylene glycol (GLYCOLAX) 17 GM/SCOOP powder Take 17 g by mouth daily as needed (constipation) 510 g 5    pravastatin (PRAVACHOL) 40 mg tablet Take 1 tablet (40 mg total) by mouth daily 90 tablet 3     No current facility-administered medications for this visit          Allergies   Allergen Reactions    Quetiapine Confusion Became more confused with 12 5 mg quetiapine therapy    Fish Oil + D3 [Fish Oil-Cholecalciferol - Food Allergy] Rash    Gabapentin Drowsiness    Oseltamivir Rash       Review of Systems   Unable to perform ROS: Dementia   Respiratory: Positive for cough  Video Exam    Vital Signs:    BP: 118/63, HR: 77, Temp: 98 7, SpO2 94% RA    Physical Exam     I spent 30 minutes with patient today in which greater than 50% of the time was spent in counseling/coordination of care regarding dementia with behaviors, urinary signs and symptoms, constipation and cough    VIRTUAL VISIT DISCLAIMER      Saint Cherry verbally agrees to participate in [employer]  Pt is aware that [employer] could be limited without vital signs or the ability to perform a full hands-on physical Millicent Ruth understands he or the provider may request at any time to terminate the video visit and request the patient to seek care or treatment in person

## 2022-03-24 NOTE — ASSESSMENT & PLAN NOTE
· Chronic cough reported that is worse at night  · Recommend taking Pepcid 10 to 20 mg daily before dinner at last office visit  · May take over the counter robitussin or mucinex as directed prn  · Will continue to monitor

## 2022-03-24 NOTE — ASSESSMENT & PLAN NOTE
· Patient with severe dementia  · Patient now has caregiver support  · Continue olanzapine 2 5 mg at bedtime, Celexa 40 mg daily  · May give an extra dose of olanzapine 2 5 mg as needed after day care  · Continue Namenda and Aricept  · Continue Adult day care 3 days per week  · Will continue to manage chronic conditions  · Recommend patient stay mentally, physically and socially engaged as tolerated  · Maintain fall precautions  · Wife continues to follow-up with psychotherapy for caregiver support

## 2022-03-24 NOTE — ASSESSMENT & PLAN NOTE
· Constipation reported that has improved with fiber supplementation  · Continue Miralax as needed  · Continue adequate fluid hydration and regular exercise as tolerated

## 2022-03-30 NOTE — TELEPHONE ENCOUNTER
Patient's daughter, Alyssa Jain, is concerned for her father  States he has had an issue with urinating for the past few weeks and feels he needs to get his prostate checked  She said she really doesn't know how this is going to go, he gets upset when you take his wallet out of his back pocket and the patient has severe dementia  Per Dr Aries Cedeno, he would like a PVR, Uroflow on the patient if the patient is able to cooperate

## 2022-04-07 NOTE — PROGRESS NOTES
4/7/2022    Akiko Chanel  1955  [id number]        Assessment  Routine prostate cancer screening, progressive dementia, mild voiding dysfunction      Discussion  I had discussion with the patient's wife in the office today regarding his lower urinary tract symptoms which overall appear to be relatively mild with minimal bother to the patient and to the wife at this time  As he does not have episodes of incontinence and his nocturia is age-appropriate I would hold on any medication for his bladder such as anticholinergics which could exacerbate his underlying mental status  In addition I would hold on alpha blockade at this time which could lead to orthostatic hypotension and a possible fall  We discussed routine prostate cancer screening  The wife has requested 1 additional PSA  We discussed that based on his age and current mental status that if this PSA is stable will discontinue PSA testing altogether based on current AUA guidelines  He will return in follow-up in 1 year or sooner if needed        History of Present Illness  69 y o  male with a history of progressive dementia  He has very mild lower urinary tract symptoms including nocturia times 1-3 episodes per night and occasional daytime urgency and frequency  He is completely dry and wears no pads  He does not have accidents  He is an extremely poor historian and provides almost no background information  This is all obtained from the patient's wife present in the office today  In addition a friend/helper is along in the office today  There is no reported hematuria  Two years ago PSA was 0 6  There is no family history of prostate cancer          AUA Symptom Score  AUA SYMPTOM SCORE      Most Recent Value   AUA SYMPTOM SCORE    How often have you had a sensation of not emptying your bladder completely after you finished urinating? 1 (P)     How often have you had to urinate again less than two hours after you finished urinating? 2 (P) How often have you found you stopped and started again several times when you urinate? 3 (P)     How often have you found it difficult to postpone urination? 3 (P)     How often have you had a weak urinary stream? 2 (P)     How often have you had to push or strain to begin urination? 3 (P)     How many times did you most typically get up to urinate from the time you went to bed at night until the time you got up in the morning? 5 (P)     Quality of Life: If you were to spend the rest of your life with your urinary condition just the way it is now, how would you feel about that? 3 (P)     AUA SYMPTOM SCORE 19 (P)           Review of Systems  Review of Systems   Constitutional: Negative  HENT: Negative  Eyes: Negative  Respiratory: Negative  Cardiovascular: Negative  Gastrointestinal: Negative  Endocrine: Negative  Genitourinary:        Per HPI   Musculoskeletal: Negative  Skin: Negative  Allergic/Immunologic: Negative  Neurological: Negative  Hematological: Negative  Psychiatric/Behavioral: Negative            Past Medical History  Past Medical History:   Diagnosis Date    Anxiety     BPH with obstruction/lower urinary tract symptoms     LAST ASSESSED: 9/15/14    Colon polyp     Dementia (HCC)     Depression     Hx of laceration of skin     LAST ASSESSED: 11/3/14    Hyperlipidemia     Memory loss     Pneumonia     LAST ASSESSED: 5/12/14    Sleep apnea     Tachycardia     LAST ASSESSED: 3/8/14    Thrombophlebitis of superficial veins of upper extremities     LAST ASSESSED: 5/8/84    Uncomplicated alcohol abuse     LAST ASSESSED: 2/28/15    Weakness of both arms     LAST ASSESSED: 5/12/14    Weakness of both legs     LAST ASSESSED: 5/12/14       Past Social History  Past Surgical History:   Procedure Laterality Date    COLONOSCOPY      COMPLETE    HERNIA REPAIR      SKIN LESION EXCISION      chest melanoma       Past Family History  Family History   Problem Relation Age of Onset    Arthritis Mother     Hyperlipidemia Mother     Dementia Mother         started late 76s    Tinnitus Mother     Hypertension Mother     Arthritis Family     Hyperlipidemia Family     Anxiety disorder Sister        Past Social history  Social History     Socioeconomic History    Marital status: /Civil Union     Spouse name: Not on file    Number of children: 1    Years of education: 12+    Highest education level: High school graduate   Occupational History    Occupation: retired   Tobacco Use    Smoking status: Former Smoker     Types: Cigarettes     Quit date: 2000     Years since quittin 2    Smokeless tobacco: Never Used    Tobacco comment: quit 20 years ago   Vaping Use    Vaping Use: Never used   Substance and Sexual Activity    Alcohol use: Yes    Drug use: No    Sexual activity: Yes     Partners: Female   Other Topics Concern    Not on file   Social History Narrative    Not on file     Social Determinants of Health     Financial Resource Strain: High Risk    Difficulty of Paying Living Expenses: Very hard   Food Insecurity: Food Insecurity Present    Worried About Running Out of Food in the Last Year: Often true    Dion of Food in the Last Year: Never true   Transportation Needs: No Transportation Needs    Lack of Transportation (Medical): No    Lack of Transportation (Non-Medical):  No   Physical Activity: Not on file   Stress: Not on file   Social Connections: Not on file   Intimate Partner Violence: Not on file   Housing Stability: Not on file       Current Medications  Current Outpatient Medications   Medication Sig Dispense Refill    citalopram (CeleXA) 40 mg tablet Take 1 tablet (40 mg total) by mouth daily 90 tablet 3    donepezil (ARICEPT) 10 mg tablet Take 1 tablet (10 mg total) by mouth 2 (two) times a day (Patient taking differently: Take 10 mg by mouth 2 (two) times a day  ) 180 tablet 3    Flaxseed, Linseed, (FLAX SEED OIL) 1300 MG CAPS Take by mouth      fluticasone (FLONASE) 50 mcg/act nasal spray SPRAY 2 SPRAYS INTO EACH NOSTRIL EVERY DAY 48 mL 1    ibuprofen (MOTRIN) 200 mg tablet Take 400 mg by mouth 3 (three) times a day as needed for pain      memantine (NAMENDA) 10 mg tablet Take 1 tablet (10 mg total) by mouth 2 (two) times a day 180 tablet 2    Multiple Vitamin (CVS DAILY MULTIPLE PO) Take by mouth      OLANZapine (ZyPREXA) 2 5 mg tablet Take 1 tablet (2 5 mg total) by mouth daily at bedtime (Patient taking differently: Take 2 5 mg by mouth 2 (two) times a day  ) 90 tablet 0    psyllium (METAMUCIL) 0 52 g capsule Take 0 52 g by mouth daily      dextromethorphan-guaiFENesin (ROBITUSSIN DM)  mg/5 mL syrup Take 5 mL by mouth 3 (three) times a day as needed for cough (Patient not taking: Reported on 3/14/2022 ) 236 mL 0    polyethylene glycol (GLYCOLAX) 17 GM/SCOOP powder Take 17 g by mouth daily as needed (constipation) 510 g 5    pravastatin (PRAVACHOL) 40 mg tablet Take 1 tablet (40 mg total) by mouth daily 90 tablet 3     No current facility-administered medications for this visit  Allergies  Allergies   Allergen Reactions    Quetiapine Confusion     Became more confused with 12 5 mg quetiapine therapy    Fish Oil + D3 [Fish Oil-Cholecalciferol - Food Allergy] Rash    Gabapentin Drowsiness    Oseltamivir Rash       Past Medical History, Social History, Family History, medications and allergies were reviewed  Vitals  Vitals:    04/07/22 1552   BP: 122/80   Pulse: 76   SpO2: 98%   Weight: 71 7 kg (158 lb)   Height: 5' 7" (1 702 m)       Physical Exam  Physical Exam    On examination he is in no acute distress  There are clearly signs of dementia present  His abdomen is soft nontender nondistended   examination reveals a right inguinal hernia scar  There are no recurrent hernias    Phallus, scrotum and scrotal contents are normal   Digital rectal examination reveals a 45 g prostate which is soft and firm without nodularity  Skin is warm  Extremities without edema    Neurologic is grossly intact and nonfocal   Gait normal   Affect normal    Results  Lab Results   Component Value Date    PSA 0 6 10/06/2020    PSA 0 6 11/01/2019    PSA 0 5 06/17/2019     Lab Results   Component Value Date    GLUCOSE 97 02/16/2014    CALCIUM 9 1 04/19/2021     09/07/2017    K 4 3 04/19/2021    CO2 28 04/19/2021     04/19/2021    BUN 18 04/19/2021    CREATININE 1 05 04/19/2021     Lab Results   Component Value Date    WBC 4 40 04/19/2021    HGB 14 8 04/19/2021    HCT 44 4 04/19/2021    MCV 90 04/19/2021     04/19/2021         Office Urine Dip  Recent Results (from the past 1 hour(s))   POCT Measure PVR    Collection Time: 04/07/22  3:53 PM   Result Value Ref Range    POST-VOID RESIDUAL VOLUME, ML POC 14 mL   ]

## 2022-04-12 NOTE — PROGRESS NOTES
Pt has not returned to OP OT services since 1/20/22 with noted medical complications  Pt will require new evaluation and script to return to OP OT services   D/C OT

## 2022-04-19 PROBLEM — R63.0 DECREASED APPETITE: Status: ACTIVE | Noted: 2022-01-01

## 2022-04-19 NOTE — PROGRESS NOTES
[facility]  [address] [facility], [address] [place] [address], [address] [place] [address]    [facility]      NAME: Rudy Patricia  AGE: 69 y o  SEX: male    DATE OF ENCOUNTER: 4/19/2022    Assessment and Plan     Problem List Items Addressed This Visit        Digestive    Slow transit constipation     Continue Glycolax as needed  Encourage increased fiber intake  Physical activity as able            Respiratory    Obstructive sleep apnea syndrome (Chronic)     Patient noncompliant with CPAP machine  Given severity of patient's dementia, this is been difficult to ensure compliance  Family is considering de-escalating care to hospice if the patient qualifies            Nervous and Auditory    Conductive hearing loss     Compliant use of hearing aids   Recommend facing patient directly and standing in close close proximity when communicating to avoid confusion         Dementia with behavioral disturbance ([facility] [address] ) - Primary     Patient with a history of severe-end-stage dementia  Remains confused at baseline  Requires continuous reorientation and redirection  Does not recognize family members anymore  Continues on Namenda and Aricept  Per medical records, patient is on Aricept 10 mg b i d  Wife to my chart us about his current dosage as I would recommend decreasing to 10 mg at nighttime  Will plan to give wife a weaning schedule if he is indeed on 20 mg daily  Currently enrolled in a senior  program 4 days weekly  Patient does have care assistance with a family friend who was a prior hospice worker  Family is interested in [address] [place] [address] to hospice services for additional support  I am unsure whether the patient will actually meet criteria at this time for hospice however will place referral for palliative care and will transition to hospice if he does meet criteria per their assessment    Will order blood work to assess nutritional status  Patient does have a decreased appetite with weight loss as well  Will consider mirtazapine in the future  Referral placed to palliative care  Reorientation and redirection as needed  Manage chronic conditions  Maintain [place] precautions  Encouraged patient remain to active mentally, physically and socially active  Participate in cognitively stimulating exercises as able  Advanced directives are in place  The patient continues to meet criteria for 24/7 supervised care  Wife has been looking into facilities and is working with a  to apply for Medicaid            Other    Anxiety (Chronic)     Overall and behaviors have remained stable  Patient continues on Celexa 40 mg daily  Continue olanzapine 2 5 mg b i d  As needed  Continue social support by family and friends  Will place a referral to palliative care  Family interested in hospice services however I am unsure whether the patient will need criteria for hospice         Mixed hyperlipidemia (Chronic)     Continues on pravastatin 40 mg daily  Order lipid panel, CMP  Family considering whether to deescalate care to hospice measures  Cough     Patient with a history of chronic cough  Has had a CT of the chest in the past years with no significant etiology  Continue Mucinex, honey  Family interested in de-escalating care to hospice services  Will refer to palliative Care for further transitioning as I am unsure whether the patient will actually meet criteria for hospice at this time         Decreased appetite     Family requesting starting Megace due to decreased appetite and weight loss  Discussed increased risk of thrombotic disease with Megace  May consider mirtazapine in the future  However will order CBC, CMP  Family interested in deescalating care to hospice  Will place a referral to palliative care as I am unsure whether patient will meet hospice criteria at this time           Goals of care, counseling/discussion     Family interested in deescalating care to hospice services  Will refer to palliative care for now who will also assess the patient for hospice eligibility             Medicare annual wellness visit, subsequent     Patient is scheduled for routine labs  Reviewed immunizations  Advanced directives in place  Family wishing to deescalate care and transition to hospice if he meets criteria  Refer to palliative care and for further evaluation by that team  Will defer any preventative screenings at this time                 - Counseling Documentation: patient was counseled regarding: diagnostic results, instructions for management, risk factor reductions, patient and family education, impressions, risks and benefits of treatment options and importance of compliance with treatment    Chief Complaint     Patient presents for routine follow-up of acute and chronic conditions, his annual Medicare wellness visit as well as for a goals of care discussion    History of Present Illness     HPI  Patient presents for routine follow-up of acute and chronic conditions, his annual Medicare wellness visit as well as for a goals of care discussion  The patient is accompanied by his wife, family friend (caregiver/prior hospice worker) and daughter is present via video  Today daughter is requesting whether we can start Megace as the patient has had a decreased appetite with weight loss  I did discuss side effects of Megace which may increase risk of a thrombosis  Discussed considering mirtazapine in the future instead  The patient remains confused at baseline due to his severe dementia and does not recognize family members  He requires frequent reorientation redirection and typically follows his wife around the house  He currently is enrolled in senior  four times weekly  Wife is concerned about the patient having a chronic cough    He has had this for several years with CT chest ruling out any acute etiology in the past   No fever, chills, sore throat, lethargy or cardiorespiratory distress  The patient has been taking Mucinex at night and honey with good effect  Family explained the patient has been drooling and will occasionally have yellow sputum production  No hemoptysis or shortness of breath  Reviewed the patient's medication regimen as he is maintained on Namenda and Aricept  Per our medical records, patient's dosage of Aricept is 10 mg twice daily  Wife will send us a [employer] message to confirm the dosage as I would recommend decreasing to 10 mg at nighttime by a slow weaning process  Family also explain from 17:00, the patient does become somewhat agitated, however he is redirectable  We also discussed goals of care today as family is interested in deescalating care and transitioning to hospice  I am unsure whether the patient will actually meet criteria at this time for hospice  However will place a referral to home palliative care where the patient can also be assessed to see whether he meets hospice criteria  Given goals of care decision, would defer any preventative screenings at this time  The patient continues on Namenda and donepezipil for a history of dementia which continues to progress  He has been compliant with pravastatin for a history of hyperlipidemia    He also continues on Celexa and olanzapine for a history behaviors due to dementia      The following portions of the patient's history were reviewed and updated as appropriate: allergies, current medications, past family history, past medical history, past social history, past surgical history and problem list     Review of Systems     Review of Systems   Unable to perform ROS: Dementia       Active Problem List     Patient Active Problem List   Diagnosis    Anxiety    Backache    Cough    Mixed hyperlipidemia    Conductive hearing loss    Malignant melanoma of skin ([facility] [address] )    Obstructive sleep apnea syndrome    Symptoms involving urinary system    Dementia with behavioral disturbance (HCC)    Driving safety issue    Slow transit constipation    Need for immunization against influenza    Decreased appetite    Goals of care, counseling/discussion    Medicare annual wellness visit, subsequent       Objective     /72 (BP Location: Left arm, Patient Position: Sitting, Cuff Size: Adult)   Pulse 88   Temp 97 7 °F (36 5 °C) (Temporal)   Resp 16   Ht 5' 8" (1 727 m)   Wt 70 4 kg (155 lb 3 2 oz)   SpO2 96%   BMI 23 60 kg/m²     Physical Exam  Vitals reviewed  Constitutional:       General: He is not in acute distress  Appearance: Normal appearance  He is well-developed  He is not ill-appearing or diaphoretic  HENT:      Head: Normocephalic and atraumatic  Right Ear: Tympanic membrane, ear canal and external ear normal       Left Ear: Tympanic membrane, ear canal and external ear normal       Nose: Nose normal       Mouth/Throat:      Mouth: Mucous membranes are moist       Pharynx: No oropharyngeal exudate  Eyes:      General: No scleral icterus  Right eye: No discharge  Left eye: No discharge  Conjunctiva/sclera: Conjunctivae normal    Neck:      Vascular: No JVD  Cardiovascular:      Rate and Rhythm: Normal rate and regular rhythm  Heart sounds: Murmur heard  No friction rub  No gallop  Pulmonary:      Effort: Pulmonary effort is normal  No respiratory distress  Breath sounds: Normal breath sounds  No wheezing or rales  Abdominal:      General: Bowel sounds are normal  There is no distension  Palpations: Abdomen is soft  Tenderness: There is no abdominal tenderness  There is no guarding  Musculoskeletal:         General: No tenderness or deformity  Normal range of motion  Cervical back: Normal range of motion and neck supple  Right lower leg: No edema  Left lower leg: No edema  Skin:     General: Skin is warm  Coloration: Skin is not pale  Findings: No erythema or rash  Neurological:      General: No focal deficit present  Mental Status: He is alert  Mental status is at baseline  He is disoriented  Cranial Nerves: No cranial nerve deficit  Deep Tendon Reflexes: Reflexes are normal and symmetric     Psychiatric:      Comments: Confused at baseline  Unable to follow commands  Moving all limbs         Pertinent Laboratory/Diagnostic Studies:  Lab Results   Component Value Date    WBC 4 40 04/19/2021    HGB 14 8 04/19/2021    HCT 44 4 04/19/2021    MCV 90 04/19/2021     04/19/2021       Lab Results   Component Value Date    SODIUM 138 04/19/2021    K 4 3 04/19/2021     04/19/2021    CO2 28 04/19/2021    BUN 18 04/19/2021    CREATININE 1 05 04/19/2021    GLUC 69 06/11/2020    CALCIUM 9 1 04/19/2021         Current Medications     Current Outpatient Medications:     citalopram (CeleXA) 40 mg tablet, Take 1 tablet (40 mg total) by mouth daily, Disp: 90 tablet, Rfl: 3    donepezil (ARICEPT) 10 mg tablet, Take 1 tablet (10 mg total) by mouth 2 (two) times a day (Patient taking differently: Take 10 mg by mouth 2 (two) times a day  ), Disp: 180 tablet, Rfl: 3    Flaxseed, Linseed, (FLAX SEED OIL) 1300 MG CAPS, Take by mouth, Disp: , Rfl:     fluticasone (FLONASE) 50 mcg/act nasal spray, SPRAY 2 SPRAYS INTO EACH NOSTRIL EVERY DAY (Patient taking differently: if needed  ), Disp: 48 mL, Rfl: 1    ibuprofen (MOTRIN) 200 mg tablet, Take 400 mg by mouth 3 (three) times a day as needed for pain, Disp: , Rfl:     memantine (NAMENDA) 10 mg tablet, Take 1 tablet (10 mg total) by mouth 2 (two) times a day, Disp: 180 tablet, Rfl: 2    Misc Natural Products (FIBER 7 PO), Take by mouth, Disp: , Rfl:     Multiple Vitamin ([employer] DAILY MULTIPLE PO), Take by mouth, Disp: , Rfl:     OLANZapine (ZyPREXA) 2 5 mg tablet, Take 1 tablet (2 5 mg total) by mouth daily at bedtime (Patient taking differently: Take 2 5 mg by mouth 2 (two) times a day  ), Disp: 90 tablet, Rfl: 0   dextromethorphan-guaiFENesin (ROBITUSSIN DM)  mg/5 mL syrup, Take 5 mL by mouth 3 (three) times a day as needed for cough (Patient not taking: Reported on 3/14/2022 ), Disp: 236 mL, Rfl: 0    polyethylene glycol (GLYCOLAX) 17 GM/SCOOP powder, Take 17 g by mouth daily as needed (constipation), Disp: 510 g, Rfl: 5    pravastatin (PRAVACHOL) 40 mg tablet, Take 1 tablet (40 mg total) by mouth daily, Disp: 90 tablet, Rfl: 3    psyllium (METAMUCIL) 0 52 g capsule, Take 0 52 g by mouth daily (Patient not taking: Reported on 4/19/2022 ), Disp: , Rfl:     Health Maintenance     Health Maintenance   Topic Date Due    Depression Follow-up Plan  Never done    COVID-19 Vaccine (2 - Moderna 3-dose series) 12/08/2021    Depression Screening  01/19/2023    Fall Risk  04/19/2023    Medicare Annual Wellness Visit (AWV)  04/19/2023    BMI: Adult  04/19/2023    DTaP,Tdap,and Td Vaccines (3 - Td or Tdap) 11/03/2028    Colorectal Cancer Screening  12/01/2030    Hepatitis C Screening  Completed    Pneumococcal Vaccine: 65+ Years  Completed    Influenza Vaccine  Completed    HIB Vaccine  Aged Out    Hepatitis B Vaccine  Aged Out    IPV Vaccine  Aged Out    Hepatitis A Vaccine  Aged Out    Meningococcal ACWY Vaccine  Aged Out    HPV Vaccine  Aged Out     Immunization History   Administered Date(s) Administered    COVID-19 MODERNA VACC 0 25 ML IM BOOSTER 11/10/2021    COVID-19 MODERNA VACC 0 5 ML IM 01/29/2021    INFLUENZA 09/15/2014, 10/28/2015, 09/28/2016, 09/12/2017    Influenza Quadrivalent Preservative Free 3 years and older IM 09/15/2014    Influenza Quadrivalent, 6-35 Months IM 10/28/2015, 09/28/2016, 09/12/2017    Influenza, high dose seasonal 0 7 mL 10/10/2018, 10/09/2019, 10/08/2020, 10/14/2021    Influenza, seasonal, injectable 10/08/2013    Pneumococcal Conjugate 13-Valent 12/10/2018    Pneumococcal Polysaccharide PPV23 12/30/2014, 10/08/2020    Tdap 11/03/2014, 11/03/2018

## 2022-04-19 NOTE — PROGRESS NOTES
Assessment and Plan:     Problem List Items Addressed This Visit        Digestive    Slow transit constipation     Continue Glycolax as needed  Encourage increased fiber intake  Physical activity as able            Respiratory    Obstructive sleep apnea syndrome (Chronic)     Patient noncompliant with CPAP machine  Given severity of patient's dementia, this is been difficult to ensure compliance  Family is considering de-escalating care to hospice if the patient qualifies            Nervous and Auditory    Conductive hearing loss     Compliant use of hearing aids   Recommend facing patient directly and standing in close close proximity when communicating to avoid confusion         Dementia with behavioral disturbance ([facility] [address] ) - Primary     Patient with a history of severe-end-stage dementia  Remains confused at baseline  Requires continuous reorientation and redirection  Does not recognize family members anymore  Continues on Namenda and Aricept  Per medical records, patient is on Aricept 10 mg b i d  Wife to my chart us about his current dosage as I would recommend decreasing to 10 mg at nighttime  Will plan to give wife a weaning schedule if he is indeed on 20 mg daily  Currently enrolled in a senior  program 4 days weekly  Patient does have care assistance with a family friend who was a prior hospice worker  Family is interested in [address] [place] [address] to hospice services for additional support  I am unsure whether the patient will actually meet criteria at this time for hospice however will place referral for palliative care and will transition to hospice if he does meet criteria per their assessment    Will order blood work to assess nutritional status  Patient does have a decreased appetite with weight loss as well  Will consider mirtazapine in the future  Referral placed to palliative care  Reorientation and redirection as needed  Manage chronic conditions  Maintain [place] precautions  Encouraged patient remain to active mentally, physically and socially active  Participate in cognitively stimulating exercises as able  Advanced directives are in place  The patient continues to meet criteria for 24/7 supervised care  Wife has been looking into facilities and is working with a  to apply for Medicaid            Other    Anxiety (Chronic)     Overall and behaviors have remained stable  Patient continues on Celexa 40 mg daily  Continue olanzapine 2 5 mg b i d  As needed  Continue social support by family and friends  Will place a referral to palliative care  Family interested in hospice services however I am unsure whether the patient will need criteria for hospice         Mixed hyperlipidemia (Chronic)     Continues on pravastatin 40 mg daily  Order lipid panel, CMP  Family considering whether to deescalate care to hospice measures  Cough     Patient with a history of chronic cough  Has had a CT of the chest in the past years with no significant etiology  Continue Mucinex, honey  Family interested in de-escalating care to hospice services  Will refer to palliative Care for further transitioning as I am unsure whether the patient will actually meet criteria for hospice at this time         Decreased appetite     Family requesting starting Megace due to decreased appetite and weight loss  Discussed increased risk of thrombotic disease with Megace  May consider mirtazapine in the future  However will order CBC, CMP  Family interested in deescalating care to hospice  Will place a referral to palliative care as I am unsure whether patient will meet hospice criteria at this time           Goals of care, counseling/discussion     Family interested in deescalating care to hospice services  Will refer to palliative care for now who will also assess the patient for hospice eligibility             Medicare annual wellness visit, subsequent     Patient is scheduled for routine labs  Reviewed immunizations  Advanced directives in place  Family wishing to deescalate care and transition to hospice if he meets criteria  Refer to palliative care and for further evaluation by that team  Will defer any preventative screenings at this time           See accompanying note for HPI/ROS/Physical exam     Preventive health issues were discussed with patient, and age appropriate screening tests were ordered as noted in patient's After Visit Summary  Personalized health advice and appropriate referrals for health education or preventive services given if needed, as noted in patient's After Visit Summary       History of Present Illness:     Patient presents for Medicare Annual Wellness visit    Patient Care Team:  Zeina Cantu MD as PCP - General (Geriatric Medicine)  Prentice Socks, MD Margart Hamman, MD     Problem List:     Patient Active Problem List   Diagnosis    Anxiety    Backache    Cough    Mixed hyperlipidemia    Conductive hearing loss    Malignant melanoma of skin ([facility] [address] [facility]    Obstructive sleep apnea syndrome    Symptoms involving urinary system    Dementia with behavioral disturbance ([facility] [address] )    Driving safety issue    Slow transit constipation    Need for immunization against influenza    Decreased appetite    Goals of care, counseling/discussion    Medicare annual wellness visit, subsequent      Past Medical and Surgical History:     Past Medical History:   Diagnosis Date    Anxiety     BPH with obstruction/lower urinary tract symptoms     LAST ASSESSED: 9/15/14    Colon polyp     Dementia ([facility] [address] )     Depression     Hx of laceration of skin     LAST ASSESSED: 11/3/14    Hyperlipidemia     Memory loss     Pneumonia     LAST ASSESSED: 5/12/14    Sleep apnea     Tachycardia     LAST ASSESSED: 3/8/14    Thrombophlebitis of superficial veins of upper extremities     LAST ASSESSED: 7/0/93    Uncomplicated alcohol abuse     LAST ASSESSED: 2/28/15    Weakness of both arms     LAST ASSESSED: 5/12/14    Weakness of both legs     LAST ASSESSED: 14     Past Surgical History:   Procedure Laterality Date    COLONOSCOPY      COMPLETE    HERNIA REPAIR      SKIN LESION EXCISION      chest melanoma      Family History:     Family History   Problem Relation Age of Onset    Arthritis Mother     Hyperlipidemia Mother     Dementia Mother         started late 76s    Tinnitus Mother     Hypertension Mother     Arthritis Family     Hyperlipidemia Family     Anxiety disorder Sister       Social History:     Social History     Socioeconomic History    Marital status: /Civil Union     Spouse name: None    Number of children: 3    Years of education: 12+    Highest education level: High school graduate   Occupational History    Occupation: retired   Tobacco Use    Smoking status: Former Smoker     Types: Cigarettes     Quit date:      Years since quittin 3    Smokeless tobacco: Never Used    Tobacco comment: quit 20 years ago   Vaping Use    Vaping Use: Never used   Substance and Sexual Activity    Alcohol use: Yes    Drug use: No    Sexual activity: Yes     Partners: Female   Other Topics Concern    None   Social History Narrative    None     Social Determinants of Health     Financial Resource Strain: High Risk    Difficulty of Paying Living Expenses: Very hard   Food Insecurity: Food Insecurity Present    Worried About Running Out of Food in the Last Year: Often true    Dion of Food in the Last Year: Never true   Transportation Needs: No Transportation Needs    Lack of Transportation (Medical): No    Lack of Transportation (Non-Medical):  No   Physical Activity: Not on file   Stress: Not on file   Social Connections: Not on file   Intimate Partner Violence: Not on file   Housing Stability: Not on file      Medications and Allergies:     Current Outpatient Medications   Medication Sig Dispense Refill    citalopram (CeleXA) 40 mg tablet Take 1 tablet (40 mg total) by mouth daily 90 tablet 3    donepezil (ARICEPT) 10 mg tablet Take 1 tablet (10 mg total) by mouth 2 (two) times a day (Patient taking differently: Take 10 mg by mouth 2 (two) times a day  ) 180 tablet 3    Flaxseed, Linseed, (FLAX SEED OIL) 1300 MG CAPS Take by mouth      fluticasone (FLONASE) 50 mcg/act nasal spray SPRAY 2 SPRAYS INTO EACH NOSTRIL EVERY DAY (Patient taking differently: if needed  ) 48 mL 1    ibuprofen (MOTRIN) 200 mg tablet Take 400 mg by mouth 3 (three) times a day as needed for pain      memantine (NAMENDA) 10 mg tablet Take 1 tablet (10 mg total) by mouth 2 (two) times a day 180 tablet 2    Misc Natural Products (FIBER 7 PO) Take by mouth      Multiple Vitamin ([facility]S DAILY MULTIPLE PO) Take by mouth      OLANZapine (ZyPREXA) 2 5 mg tablet Take 1 tablet (2 5 mg total) by mouth daily at bedtime (Patient taking differently: Take 2 5 mg by mouth 2 (two) times a day  ) 90 tablet 0    dextromethorphan-guaiFENesin (ROBITUSSIN DM)  mg/5 mL syrup Take 5 mL by mouth 3 (three) times a day as needed for cough (Patient not taking: Reported on 3/14/2022 ) 236 mL 0    polyethylene glycol (GLYCOLAX) 17 GM/SCOOP powder Take 17 g by mouth daily as needed (constipation) 510 g 5    pravastatin (PRAVACHOL) 40 mg tablet Take 1 tablet (40 mg total) by mouth daily 90 tablet 3    psyllium (METAMUCIL) 0 52 g capsule Take 0 52 g by mouth daily (Patient not taking: Reported on 4/19/2022 )       No current facility-administered medications for this visit       Allergies   Allergen Reactions    Quetiapine Confusion     Became more confused with 12 5 mg quetiapine therapy    Fish Oil + D3 [Fish Oil-Cholecalciferol - Food Allergy] Rash    Gabapentin Drowsiness    Oseltamivir Rash      Immunizations:     Immunization History   Administered Date(s) Administered    COVID-19 MODERNA VACC 0 25 ML IM BOOSTER 11/10/2021    COVID-19 MODERNA VACC 0 5 ML IM 01/29/2021    INFLUENZA 09/15/2014, 10/28/2015, 09/28/2016, 09/12/2017    Influenza Quadrivalent Preservative Free 3 years and older IM 09/15/2014    Influenza Quadrivalent, 6-35 Months IM 10/28/2015, 09/28/2016, 09/12/2017    Influenza, high dose seasonal 0 7 mL 10/10/2018, 10/09/2019, 10/08/2020, 10/14/2021    Influenza, seasonal, injectable 10/08/2013    Pneumococcal Conjugate 13-Valent 12/10/2018    Pneumococcal Polysaccharide PPV23 12/30/2014, 10/08/2020    Tdap 11/03/2014, 11/03/2018      Health Maintenance:         Topic Date Due    Colorectal Cancer Screening  12/01/2030    Hepatitis C Screening  Completed         Topic Date Due    COVID-19 Vaccine (2 - Moderna 3-dose series) 12/08/2021      Medicare Health Risk Assessment:     /72 (BP Location: Left arm, Patient Position: Sitting, Cuff Size: Adult)   Pulse 88   Temp 97 7 °F (36 5 °C) (Temporal)   Resp 16   Ht 5' 8" (1 727 m)   Wt 70 4 kg (155 lb 3 2 oz)   SpO2 96%   BMI 23 60 kg/m²      Yael Hayes is here for his Subsequent Wellness visit  Historian  Patient cannot answer questions due to cognitive impairment, intelluctual disability, or expressive limitations  Health Risk Assessment:   Patient rates overall health as poor  Patient feels that their physical health rating is much worse  Eyesight was rated as same  Hearing was rated as same  Patient feels that their emotional and mental health rating is much worse  Patients states they are sometimes angry  Pain experienced in the last 7 days has been none  5 pounds in the past 3 months     Fall Risk Screening: In the past year, patient has experienced: history of falling in past year    Number of falls: 1  Injured during fall?: No    Feels unsteady when standing or walking?: No    Worried about falling?: No      Home Safety:  Patient does not have trouble with stairs inside or outside of their home  Patient has working smoke alarms and has working carbon monoxide detector  Home safety hazards include: none  Nutrition:   Current diet is Regular  Medications:   Patient is currently taking over-the-counter supplements  OTC medications include: see medication list  Patient is not able to manage medications  Wife manages     Activities of Daily Living (ADLs)/Instrumental Activities of Daily Living (IADLs):   Walk and transfer into and out of bed and chair?: Yes  Dress and groom yourself?: No    Bathe or shower yourself?: No    Feed yourself? Yes  Do your laundry/housekeeping?: No  Manage your money, pay your bills and track your expenses?: No  Make your own meals?: No    Do your own shopping?: No    ADL comments: Wife helps/     Previous Hospitalizations:   Any hospitalizations or ED visits within the last 12 months?: No      Advance Care Planning:   Living will: Yes    Durable POA for healthcare: Yes    Advanced directive: Yes      Cognitive Screening:   Provider or family/friend/caregiver concerned regarding cognition?: Yes  Mini-Cog Score: 0  Interpretation: Mini-Cog Score 0-2: Positive screen for dementia    PREVENTIVE SCREENINGS      Cardiovascular Screening:    General: History Lipid Disorder    Due for: Lipid Panel      Diabetes Screening:       Due for: Blood Glucose      Colorectal Cancer Screening:     General: Screening Current      Prostate Cancer Screening:    General: Screening Current      Osteoporosis Screening:    General: Screening Not Indicated      Abdominal Aortic Aneurysm (AAA) Screening:    Risk factors include: age between 73-69 yo and tobacco use        General: Risks and Benefits Discussed      Lung Cancer Screening:     General: Screening Not Indicated      Hepatitis C Screening:    General: Screening Current    Screening, Brief Intervention, and Referral to Treatment (SBIRT)    Screening  Typical number of drinks in a day: 0  Typical number of drinks in a week: 0  Interpretation: Low risk drinking behavior      Single Item Drug Screening:  How often have you used an illegal drug (including marijuana) or a prescription medication for non-medical reasons in the past year? never    Single Item Drug Screen Score: 0  Interpretation: Negative screen for possible drug use disorder    Other Counseling Topics:   Car/seat belt/driving safety         Prosper Romero MD

## 2022-04-20 PROBLEM — Z71.89 GOALS OF CARE, COUNSELING/DISCUSSION: Status: ACTIVE | Noted: 2022-01-01

## 2022-04-20 PROBLEM — Z00.00 MEDICARE ANNUAL WELLNESS VISIT, SUBSEQUENT: Status: ACTIVE | Noted: 2022-01-01

## 2022-04-20 NOTE — ASSESSMENT & PLAN NOTE
Overall and behaviors have remained stable  Patient continues on Celexa 40 mg daily  Continue olanzapine 2 5 mg b i d   As needed  Continue social support by family and friends  Will place a referral to palliative care  Family interested in hospice services however I am unsure whether the patient will need criteria for hospice

## 2022-04-20 NOTE — ASSESSMENT & PLAN NOTE
Family requesting starting Megace due to decreased appetite and weight loss  Discussed increased risk of thrombotic disease with Megace  May consider mirtazapine in the future  However will order CBC, CMP  Family interested in deescalating care to hospice  Will place a referral to palliative care as I am unsure whether patient will meet hospice criteria at this time

## 2022-04-20 NOTE — ASSESSMENT & PLAN NOTE
Patient with a history of severe-end-stage dementia  Remains confused at baseline  Requires continuous reorientation and redirection  Does not recognize family members anymore  Continues on Namenda and Aricept  Per medical records, patient is on Aricept 10 mg b i d  Wife to my chart us about his current dosage as I would recommend decreasing to 10 mg at nighttime  Will plan to give wife a weaning schedule if he is indeed on 20 mg daily  Currently enrolled in a senior  program 4 days weekly  Patient does have care assistance with a family friend who was a prior hospice worker  Family is interested in [address] [place] [address] to hospice services for additional support  I am unsure whether the patient will actually meet criteria at this time for hospice however will place referral for palliative care and will transition to hospice if he does meet criteria per their assessment    Will order blood work to assess nutritional status  Patient does have a decreased appetite with weight loss as well  Will consider mirtazapine in the future  Referral placed to palliative care  Reorientation and redirection as needed  Manage chronic conditions  Maintain [place] precautions  Encouraged patient remain to active mentally, physically and socially active  Participate in cognitively stimulating exercises as able  Advanced directives are in place  The patient continues to meet criteria for 24/7 supervised care  Wife has been looking into facilities and is working with a  to apply for Medicaid

## 2022-04-20 NOTE — ASSESSMENT & PLAN NOTE
Compliant use of hearing aids   Recommend facing patient directly and standing in close close proximity when communicating to avoid confusion

## 2022-04-20 NOTE — ASSESSMENT & PLAN NOTE
Family interested in deescalating care to hospice services  Will refer to palliative care for now who will also assess the patient for hospice eligibility

## 2022-04-20 NOTE — ASSESSMENT & PLAN NOTE
Patient noncompliant with CPAP machine  Given severity of patient's dementia, this is been difficult to ensure compliance  Family is considering de-escalating care to hospice if the patient qualifies

## 2022-04-20 NOTE — ASSESSMENT & PLAN NOTE
Patient with a history of chronic cough  Has had a CT of the chest in the past years with no significant etiology  Continue Mucinex, honey  Family interested in de-escalating care to hospice services  Will refer to palliative Care for further transitioning as I am unsure whether the patient will actually meet criteria for hospice at this time

## 2022-04-20 NOTE — ASSESSMENT & PLAN NOTE
Patient is scheduled for routine labs  Reviewed immunizations  Advanced directives in place  Family wishing to deescalate care and transition to hospice if he meets criteria  Refer to palliative care and for further evaluation by that team  Will defer any preventative screenings at this time

## 2022-04-20 NOTE — ASSESSMENT & PLAN NOTE
Continues on pravastatin 40 mg daily  Order lipid panel, CMP  Family considering whether to deescalate care to hospice measures

## 2022-04-21 NOTE — TELEPHONE ENCOUNTER
Cindy Khan of [facility] Chino  ([phone]) called to say the patient will be seen on 4/26/22 for his initial evaluation

## 2022-05-20 NOTE — TELEPHONE ENCOUNTER
Called pt's wife and explained Dr Richelle Briggs message  Amos Gonzalez is currently driving and stated that she will call back later to talk more about the forms

## 2022-05-20 NOTE — TELEPHONE ENCOUNTER
Pt wife called our office back and I asked if they have received an admission date  Pt wife stated not as of now but is waiting on a phone call with the date  I asked pt wife to please give our office a call back once she knows the admission date so our office can schedule accordingly

## 2022-05-20 NOTE — TELEPHONE ENCOUNTER
Pt wife called our office and stated she would like to schedule now even though there is not an admission date set yet  I mentioned this appt needs to be scheuleded within 30 days of admission  Pt wife mentions she understands but wants this appt next week  Virtual appt has been scheduled for 5/26/2022

## 2022-05-26 NOTE — PROGRESS NOTES
Virtual Regular Visit    Verification of patient location:    Patient is located in the following state in which I hold an active license [place]      Assessment/Plan:    Problem List Items Addressed This Visit        Nervous and Auditory    Conductive hearing loss     Continue use of hearing aids  Follow-up with audiology as needed  Recommend facing patient directly and standing in close proximity when communicating to avoid confusion           Dementia with behavioral disturbance ([facility] [address] ) - Primary     Patient with a history of severe dementia  Dependent in ADLs in all IADLs  Patient has continued to meet criteria for 24/7 supervised care  Family has made decision to transition to a higher level of care the patient will be admitted to memory care unit  Visit being done today as a requisite to admission which will likely occur next week  Wife has applied for Medicaid with hopes to transition to [address] [place] [address] in the future  Continue Aricept, Namenda  Continue Celexa, Zyprexa  Reorientation redirection as needed  Manage chronic conditions  Maintain Falls precautions  Encourage patient remain active mentally, physically and socially  Participate in cognitively stimulating exercises as able  Wife continues to follow with our behavioral therapist for supportive therapy              Other    Anxiety (Chronic)     Patient maintained on Celexa, a lancet pain  Continue social support by family and friends             Mixed hyperlipidemia (Chronic)     Continue pravastatin 40 mg daily  Recommend heart healthy diet                      Reason for visit is   Chief Complaint   Patient presents with    Virtual Brief Visit    Virtual Regular Visit        Encounter provider Devin Stallworth MD    Provider located at [address] [place] [address]  [phone]      Recent Visits  Date Type Provider Dept   05/26/22 Ryan Garza MD [facility]   Showing recent visits within past 7 days and meeting all other requirements  Future Appointments  No visits were found meeting these conditions  Showing future appointments within next 150 days and meeting all other requirements       The patient was identified by name and date of birth  Janie Waterman was informed that this is a telemedicine visit and that the visit is being conducted through [employer] and patient was informed that this is a secure, HIPAA-compliant platform  He agrees to proceed     My office door was closed  No one else was in the room  He acknowledged consent and understanding of privacy and security of the video platform  The patient has agreed to participate and understands they can discontinue the visit at any time  Patient is aware this is a billable service  Subjective  Janie Waterman is a 69 y o  male who presents for follow-up of dementia prior to memory care unit placement   HPI     Patient presents for follow-up of his dementia  Given severity of dementia, ROS and HPI obtained from the patient's wife  Today the appointment was scheduled for a physical exam prior to the patient transitioning to a higher level of care  The patient does have a history of severe dementia and has met requirements previously for 24/7 supervised care  Initial goal per family was to keep the patient at home and he had enrolled into a senior  program   Wife continues to have significant caregiver burnout and the patient has had increased needs  The patient will be going to a memory care unit and wife states in the future after approval if he qualifies for Medicaid will transition to [address] [place] [address]  Wife explains a nurse from the patient's memory care unit was present today to evaluate the patient who met criteria for the memory care unit  Overall, mood has remained relatively stable    Wife does relate an incident of the patient going out and sitting in the car stating he wanted to drive although he did not know how to start the vehicle  Wife has discontinued the patient's multivitamins and herbal supplements due to occasional dysphagia  We had discussed prior workup for dysplagia which was declined at prior visit  No cardiorespiratory distress, fever, chills, URI, urinary symptoms or recent falls  Wife advised that once the patient transitions to a higher level of care, he should establish care with the in-house PCP at the facility          Past Medical History:   Diagnosis Date    Anxiety     BPH with obstruction/lower urinary tract symptoms     LAST ASSESSED: 9/15/14    Colon polyp     Dementia ([facility] [address] )     Depression     Hx of laceration of skin     LAST ASSESSED: 11/3/14    Hyperlipidemia     Memory loss     Pneumonia     LAST ASSESSED: 5/12/14    Sleep apnea     Tachycardia     LAST ASSESSED: 3/8/14    Thrombophlebitis of superficial veins of upper extremities     LAST ASSESSED: 8/7/10    Uncomplicated alcohol abuse     LAST ASSESSED: 2/28/15    Weakness of both arms     LAST ASSESSED: 5/12/14    Weakness of both legs     LAST ASSESSED: 5/12/14       Past Surgical History:   Procedure Laterality Date    COLONOSCOPY      COMPLETE    HERNIA REPAIR      SKIN LESION EXCISION      chest melanoma       Current Outpatient Medications   Medication Sig Dispense Refill    citalopram (CeleXA) 40 mg tablet Take 1 tablet (40 mg total) by mouth daily 90 tablet 3    donepezil (ARICEPT) 10 mg tablet Take 1 tablet (10 mg total) by mouth 2 (two) times a day (Patient taking differently: Take 10 mg by mouth 2 (two) times a day) 180 tablet 3    fluticasone (FLONASE) 50 mcg/act nasal spray SPRAY 2 SPRAYS INTO EACH NOSTRIL EVERY DAY (Patient taking differently: if needed) 48 mL 1    memantine (NAMENDA) 10 mg tablet Take 1 tablet (10 mg total) by mouth 2 (two) times a day 180 tablet 2    OLANZapine (ZyPREXA) 2 5 mg tablet Take 1 tablet (2 5 mg total) by mouth daily at bedtime (Patient taking differently: Take 2 5 mg by mouth 2 (two) times a day) 90 tablet 0    polyethylene glycol (GLYCOLAX) 17 GM/SCOOP powder Take 17 g by mouth daily as needed (constipation) 510 g 5    pravastatin (PRAVACHOL) 40 mg tablet Take 1 tablet (40 mg total) by mouth daily 90 tablet 3     No current facility-administered medications for this visit  Allergies   Allergen Reactions    Quetiapine Confusion     Became more confused with 12 5 mg quetiapine therapy    Fish Oil + D3 [Fish Oil-Cholecalciferol - Food Allergy] Rash    Gabapentin Drowsiness    Oseltamivir Rash       Review of Systems   Unable to perform ROS: Dementia       Video Exam    There were no vitals filed for this visit  Physical Exam  Constitutional:       General: He is not in acute distress  Appearance: Normal appearance  He is not ill-appearing or diaphoretic  HENT:      Head: Normocephalic and atraumatic  Right Ear: External ear normal       Left Ear: External ear normal       Nose: Nose normal       Mouth/Throat:      Mouth: Mucous membranes are moist    Eyes:      General:         Right eye: No discharge  Left eye: No discharge  Conjunctiva/sclera: Conjunctivae normal    Pulmonary:      Effort: Pulmonary effort is normal  No respiratory distress  Abdominal:      General: There is no distension  Palpations: Abdomen is soft  Tenderness: There is no abdominal tenderness  Musculoskeletal:         General: Normal range of motion  Cervical back: Normal range of motion  Skin:     General: Skin is dry  Neurological:      General: No focal deficit present  Mental Status: He is alert  Mental status is at baseline  He is disoriented     Psychiatric:      Comments: Patient remains confused at baseline  Difficult to follow commands  Moving all limbs          I spent 20 minutes directly with the patient during this visit  (video and phone)  VIRTUAL VISIT DISCLAIMER      Naya Branch verbally agrees to participate in [employer]  Pt is aware that [employer] could be limited without vital signs or the ability to perform a full hands-on physical Glorianne Kimberly understands he or the provider may request at any time to terminate the video visit and request the patient to seek care or treatment in person

## 2022-05-27 NOTE — ASSESSMENT & PLAN NOTE
Patient with a history of severe dementia  Dependent in ADLs in all IADLs  Patient has continued to meet criteria for 24/7 supervised care  Family has made decision to transition to a higher level of care the patient will be admitted to memory care unit  Visit being done today as a requisite to admission which will likely occur next week  Wife has applied for Medicaid with hopes to transition to [address] [place] [address] in the future  Continue Aricept, Namenda  Continue Celexa, Zyprexa  Reorientation redirection as needed  Manage chronic conditions  Maintain Falls precautions  Encourage patient remain active mentally, physically and socially  Participate in cognitively stimulating exercises as able  Wife continues to follow with our behavioral therapist for supportive therapy

## 2022-05-27 NOTE — ASSESSMENT & PLAN NOTE
Continue use of hearing aids  Follow-up with audiology as needed  Recommend facing patient directly and standing in close proximity when communicating to avoid confusion

## 2022-06-09 NOTE — TELEPHONE ENCOUNTER
Left message for patient's daughter, Gato Young ([phone]) to advise that [facility] paperwork is completed and there is a fee of $25 00  Paperwork is in this MR's mailbox  Charges have NOT been entered in patient's [place] billing

## 2022-06-28 NOTE — TELEPHONE ENCOUNTER
Pt wife called our office stating that pt has recently been placed into the [address] [place] [address] facility  Pt wife stated she has not been happy with their care and would like to have the pt moved to [facility] but needs to go on a waitlist first  Pt spouse had asked about the DME form  I mentioned they are normally good for 60 days so she should call [facility] and find out if they will accept previous DME that was filled out on 5/26/2022  PT wife confirmed she would do this and then get back to me

## 2022-06-28 NOTE — TELEPHONE ENCOUNTER
Pt spouse called our office back and stated she spoke with Figueroa from [address] [facility] and she mentioned that the DME form is ok and we should fax it over to them  I will fax DME form to [address]      Fax Number [phone]

## 2022-07-09 PROBLEM — U07.1 PNEUMONIA DUE TO COVID-19 VIRUS: Status: ACTIVE | Noted: 2022-01-01

## 2022-07-09 PROBLEM — J12.82 PNEUMONIA DUE TO COVID-19 VIRUS: Status: ACTIVE | Noted: 2022-01-01
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
## 2022-07-09 PROBLEM — I95.9 HYPOTENSION: Status: ACTIVE | Noted: 2022-01-01

## 2022-07-09 NOTE — CASE MANAGEMENT
Case Management Assessment & Discharge Planning Note    Patient name Jagjit Escamilla  Location ED 21/ED 21 MRN [id number]  : 1955 Date 2022       Current Admission Date: 2022  Current Admission Diagnosis:Pneumonia due to COVID-19 virus   Patient Active Problem List    Diagnosis Date Noted    Pneumonia due to COVID-19 virus 2022    Fall 2022    Hypotension 2022    Goals of care, counseling/discussion 2022    Medicare annual wellness visit, subsequent 2022    Decreased appetite 2022    Need for immunization against influenza 10/15/2021    Slow transit constipation 2021    Driving safety issue 2021    D[facility] ) 2020    Malignant melanoma of skin ([facility] ) 2016    Cough 2016    Anxiety 2014    Backache 2014    Conductive hearing loss 2014    Mixed hyperlipidemia 10/08/2013    Obstructive sleep apnea syndrome 10/08/2013    Symptoms involving urinary system 2013      LOS (days): 0  Geometric Mean LOS (GMLOS) (days):   Days to GMLOS:     OBJECTIVE:    Risk of Unplanned Readmission Score: 11 41      Current admission status: Inpatient  Referral Reason: Hospice    Preferred Pharmacy:   [address] [facility]  [address] [address]  Phone: [phone] Fax: [phone]    [address], [address] [facility] [address]  [address] [place] [address]  Phone: [phone] Fax: [facility], [facility] [address]  [address]   Phone: [phone] Fax: [phone]    [address]  [address]  [place] [address] [address]  Phone: [phone] Fax: [phone]    Primary Care Provider: Renetta Valerio MD    Primary Insurance: MEDICARE  Secondary Insurance: COMMERCIAL MISCELLANEOUS    ASSESSMENT:  Active Health Care Proxies    There are no active Health Care Proxies on file  Readmission Root Cause  30 Day Readmission: No    Patient Information  Admitted from[de-identified] Facility  Mental Status: Confused  During Assessment patient was accompanied by: Spouse, Daughter  Assessment information provided by[de-identified] Spouse, Daughter  Primary Caregiver: Other (Comment)  Caregiver's Name[de-identified] [facility] at [facility] [place] [address] Relationship to Patient[de-identified] Other (Specify) (Staff)  Support Systems: Spouse/significant other, Daughter, Family members  [place] of Residence: [address] [place] [address] do you live in?: [place]      DISCHARGE DETAILS:    Discharge planning discussed with[de-identified] Spouse and daughter  Freedom of Choice: Yes     CM contacted family/caregiver?: Yes  Were Treatment Team discharge recommendations reviewed with patient/caregiver?: Yes  Did patient/caregiver verbalize understanding of patient care needs?: Yes  Were patient/caregiver advised of the risks associated with not following Treatment Team discharge recommendations?: Yes    Contacts  Patient Contacts: Arabella Finch (Spouse)   [phone] (Home Phone)  Relationship to Patient[de-identified] Family  Contact Method: In Person  Reason/Outcome: Emergency Contact, Discharge [address]         Is the patient interested in [address] at discharge?: No    Other Referral/Resources/Interventions Provided:  Interventions: Hospice, Transportation    Treatment Team Recommendation: Hospice     Transport at Discharge : BLS Ambulance         Additional Comments: Met with spouse and daughter in family room in ED  Unable to meet at bedside patient in COVID isolation  Discussed hospice care with family in family meeting room  Patient was at [facility]  Family interested in [address] vs SNF vs IPU  [facility] to [facility] [place] she is comming to ER to meet with family

## 2022-07-09 NOTE — ASSESSMENT & PLAN NOTE
· He takes MiraLax on a regular basis  · CT abdomen pelvis: Moderate constipation and fecal impaction  Mild abnormal appearance of the rectum, suspicious for stercoral proctitis  Follow-up GI and/or surgical consultation recommended     · Per digital rectal exam, no fecal impaction appreciated, soft brown stool  · Hospice consultation pending  · Dulcolax suppository per comfort orders

## 2022-07-09 NOTE — ED NOTES
Patient family has questions about getting the home set up for home hospice  Message sent to Alex Beauchamp, case management to speak with family       Henry Vann RN  07/09/22 [id number]

## 2022-07-09 NOTE — ED NOTES
Patient brief soiled  Patient cleaned  [place] catheter placed at this time       Ernst Ramirez RN  07/09/22 [id number]

## 2022-07-09 NOTE — ASSESSMENT & PLAN NOTE
· Blood pressure 79/49  · Lactic acid 0 9  · Procalcitonin 0 07  · He received a 3 L normal saline bolus  · He received vancomycin and cefepime in the ER-monitor off antibiotics  · Monitor blood pressure  · Family requesting level 4 comfort care

## 2022-07-09 NOTE — ASSESSMENT & PLAN NOTE
· At baseline  · Resides at the [facility]  · Hold hold citalopram, donepezil, and memantine for now  · Hold Zyprexa for now  · Ativan per comfort orders  · Supportive care Yes

## 2022-07-09 NOTE — H&P
[address]  H&P- Neihart Conception 1955, 69 y o  male MRN: [id number]  Unit/Bed#: ED 21 Encounter: [id number]  Primary Care Provider: Nevin Fowler MD   Date and time admitted to hospital: 7/9/2022  1:50 AM    * Pneumonia due to COVID-19 virus  Assessment & Plan  · Presented for fall  · Found to be COVID positive  · CT chest PE study: Scattered groundglass infiltrates, most pronounced right lower lobe  Correlate for multifocal/multi lobar pneumonia  No CT evidence of pulmonary emboli  · Currently on room air, saturating 97%  · D-dimer:  0 87  · Reached out to hospice via [facility] text, they are seeing if someone can, up to [place] today  If they are unable to consult today, daughter would like to try remdesivir and steroids for comfort measures  Per hospice, these would be discontinued when he is accepted to hospice care  Family would like to pursue hospice at home, although he currently resides in a memory care unit  · Monitor oxygen saturation  · Level 4 comfort care per family    Hypotension  Assessment & Plan  · Blood pressure 79/49  · Lactic acid 0 9  · Procalcitonin 0 07  · He received a 3 L normal saline bolus  · He received vancomycin and cefepime in the ER-monitor off antibiotics  · Monitor blood pressure  · Family requesting level 4 comfort care      Fall  Assessment & Plan  · Unwitnessed fall, patient with dementia  · CT head: Small right frontal scalp hematomas but no calvarial fracture or acute intracranial process is seen   · CT C-spine: Degenerative changes as described no evidence of acute cervical spine injury   · Fall precautions  · Pain control if needed  · Hospice evaluation pending    Slow transit constipation  Assessment & Plan  · He takes MiraLax on a regular basis  · CT abdomen pelvis: Moderate constipation and fecal impaction  Mild abnormal appearance of the rectum, suspicious for stercoral proctitis  Follow-up GI and/or surgical consultation recommended  · Per digital rectal exam, no fecal impaction appreciated, soft brown stool  · Hospice consultation pending  · Dulcolax suppository per comfort orders      Dementia with behavioral disturbance ([address] )  Assessment & Plan  · At baseline  · Resides at [facility]  · Hold hold citalopram, donepezil, and memantine for now  · Hold Zyprexa for now  · Ativan per comfort orders  · Supportive care    VTE Prophylaxis: Pharmacologic VTE Prophylaxis contraindicated due to comfort care  Code Status:  Level 4 comfort care  POLST: POLST form is not discussed and not completed at this time  and patient has a DNR which wife is bringing from home  Discussion with family:  Daughter    Anticipated Length of Stay:  Patient will be admitted on an Inpatient basis with an anticipated length of stay of  greater than 2 midnights  Justification for Hospital Stay:  Per plan above    Total Time for Visit, including Counseling / Coordination of Care: 45 minutes  Greater than 50% of this total time spent on direct patient counseling and coordination of care  Chief Complaint:   Fall    History of Present Illness:    Colin Sethi is a 69 y o  male who presents with unwitnessed fall  He was found down and the circumstances of the fall are not known  He has dementia and is unable to provide details  He was found to be COVID positive with evidence of pneumonia on CT scan  He also had 2 episodes of hypotension  He was treated 3 L of normal saline  Procalcitonin is negative, lactic 0 9  He did receive broad-spectrum antibiotics  Discussion with daughter was had regarding goals of care  She would like him to be kept comfortable without aggressive treatment, however is open to things that might improve his comfort  Unable to obtain review of systems from patient due to mental status  Review of Systems:    Review of Systems   Constitutional: Negative for chills and fever     HENT: Negative for ear pain and sore throat  Eyes: Negative for pain and visual disturbance  Respiratory: Negative for cough and shortness of breath  Cardiovascular: Negative for chest pain and palpitations  Gastrointestinal: Negative for abdominal pain and vomiting  Genitourinary: Negative for dysuria and hematuria  Musculoskeletal: Negative for arthralgias and back pain  Skin: Negative for color change and rash  Neurological: Negative for seizures and syncope  All other systems reviewed and are negative  Past Medical and Surgical History:     Past Medical History:   Diagnosis Date    Anxiety     BPH with obstruction/lower urinary tract symptoms     LAST ASSESSED: 9/15/14    Colon polyp     Dementia (HCC)     Depression     Hx of laceration of skin     LAST ASSESSED: 11/3/14    Hyperlipidemia     Memory loss     Pneumonia     LAST ASSESSED: 5/12/14    Sleep apnea     Tachycardia     LAST ASSESSED: 3/8/14    Thrombophlebitis of superficial veins of upper extremities     LAST ASSESSED: 8/7/68    Uncomplicated alcohol abuse     LAST ASSESSED: 2/28/15    Weakness of both arms     LAST ASSESSED: 5/12/14    Weakness of both legs     LAST ASSESSED: 5/12/14       Past Surgical History:   Procedure Laterality Date    COLONOSCOPY      COMPLETE    HERNIA REPAIR      SKIN LESION EXCISION      chest melanoma       Meds/Allergies:    Prior to Admission medications    Medication Sig Start Date End Date Taking?  Authorizing Provider   citalopram (CeleXA) 40 mg tablet Take 1 tablet (40 mg total) by mouth daily 7/19/21   Kaleb Marshall MD   donepezil (ARICEPT) 10 mg tablet Take 1 tablet (10 mg total) by mouth 2 (two) times a day  Patient taking differently: Take 10 mg by mouth 2 (two) times a day 7/19/21   Kaleb Marshall MD   fluticasone (FLONASE) 50 mcg/act nasal spray SPRAY 2 SPRAYS INTO EACH NOSTRIL EVERY DAY  Patient taking differently: if needed 11/12/20   Reagan Hernandez DO   memantine (NAMENDA) 10 mg tablet Take 1 tablet (10 mg total) by mouth 2 (two) times a day 10/22/21   Sasha Pacheco MD   OLANZapine (ZyPREXA) 2 5 mg tablet TAKE 1 TABLET BY MOUTH AT BEDTIME 22   Sasha Pacheco MD   polyethylene glycol (GLYCOLAX) 17 GM/SCOOP powder Take 17 g by mouth daily as needed (constipation) 3/24/21 6/22/21  Latricia Cole MD   pravastatin (PRAVACHOL) 40 mg tablet Take 1 tablet (40 mg total) by mouth daily 7/19/21 10/17/21  Latricia Cole MD     I have reviewed home medications with patient family member  Allergies: Allergies   Allergen Reactions    Quetiapine Confusion     Became more confused with 12 5 mg quetiapine therapy    Fish Oil + D3 [Fish Oil-Cholecalciferol - Food Allergy] Rash    Gabapentin Drowsiness    Oseltamivir Rash       Social History:     Marital Status: /Civil Union   Occupation:  Not employed  Patient Pre-hospital Living Situation:  Skilled nursing memory care  Patient Pre-hospital Level of Mobility:  Independent  Patient Pre-hospital Diet Restrictions:  None  Substance Use History:   Social History     Substance and Sexual Activity   Alcohol Use Yes     Social History     Tobacco Use   Smoking Status Former Smoker    Types: Cigarettes    Quit date:     Years since quittin 5   Smokeless Tobacco Never Used   Tobacco Comment    quit 20 years ago     Social History     Substance and Sexual Activity   Drug Use No       Family History:    non-contributory    Physical Exam:     Vitals:   Blood Pressure: 103/59 (22 0900)  Pulse: 55 (22 0900)  Temperature: 97 7 °F (36 5 °C) (22 0242)  Respirations: 16 (22 0900)  Weight - Scale: 49 7 kg (109 lb 9 1 oz) (22 0152)  SpO2: 97 % (22 09)    Physical Exam  Vitals and nursing note reviewed  Constitutional:       Appearance: He is ill-appearing  HENT:      Head: Normocephalic  Mouth/Throat:      Mouth: Mucous membranes are dry  Pharynx: Oropharynx is clear     Eyes:      Pupils: Pupils are equal, round, and reactive to light  Cardiovascular:      Rate and Rhythm: Normal rate and regular rhythm  Pulses: Normal pulses  Pulmonary:      Effort: Pulmonary effort is normal  No respiratory distress  Breath sounds: Decreased breath sounds present  Abdominal:      General: Bowel sounds are normal       Palpations: Abdomen is soft  Tenderness: There is no abdominal tenderness  Genitourinary:     Comments: No impaction present in rectal vault on digital exam   Soft brown stool  Musculoskeletal:      Cervical back: Neck supple  Skin:     General: Skin is warm and dry  Capillary Refill: Capillary refill takes less than 2 seconds  Neurological:      General: No focal deficit present  Mental Status: He is disoriented  Additional Data:     Lab Results: I have personally reviewed pertinent reports  Results from last 7 days   Lab Units 07/09/22  0210   WBC Thousand/uL 3 80*   HEMOGLOBIN g/dL 13 1   HEMATOCRIT % 39 1   PLATELETS Thousands/uL 158   NEUTROS PCT % 52   LYMPHS PCT % 32   MONOS PCT % 15*   EOS PCT % 1     Results from last 7 days   Lab Units 07/09/22  0210   SODIUM mmol/L 137   POTASSIUM mmol/L 3 4*   CHLORIDE mmol/L 101   CO2 mmol/L 29   BUN mg/dL 28*   CREATININE mg/dL 0 84   ANION GAP mmol/L 7   CALCIUM mg/dL 9 0   ALBUMIN g/dL 3 7   TOTAL BILIRUBIN mg/dL 0 58   ALK PHOS U/L 53   ALT U/L 54*   AST U/L 155*   GLUCOSE RANDOM mg/dL 85     Results from last 7 days   Lab Units 07/09/22  0210   INR  0 95             Results from last 7 days   Lab Units 07/09/22  0210   LACTIC ACID mmol/L 0 9   PROCALCITONIN ng/ml 0 07       Imaging: I have personally reviewed pertinent reports  PE Study with CT Abdomen and Pelvis with contrast   Final Result by Merleen Jeans, DO (07/09 0715)   1  Suboptimal examination due to beam hardening artifact from imaging the patient with his arms at his side and overlying the lower pelvis        2   Scattered groundglass infiltrates, most pronounced right lower lobe  Correlate for multifocal/multi lobar pneumonia  In the setting of clinically suspected/proven COVID-19, the above lung parenchymal findings on CT indicate intermediate confidence    level for COVID-19       3   Moderate constipation and fecal impaction  Mild abnormal appearance of the rectum, suspicious for stercoral proctitis  Follow-up GI and/or surgical consultation recommended  4   No CT evidence of pulmonary emboli  Workstation performed: [id number]         CT head without contrast   Final Result by Brown Mason DO (07/09 6327)      Small right frontal scalp hematomas but no calvarial fracture or acute intracranial process is seen  Other findings as above  CT CERVICAL SPINE - WITHOUT CONTRAST      INDICATION:   Head trauma, minor (Age >= 65y)   abrasion L scalp  COMPARISON:  None  TECHNIQUE:  CT examination of the cervical spine was performed without intravenous contrast   Contiguous axial images were obtained  Sagittal and coronal reconstructions were performed  Radiation dose length product (DLP) for this visit:  703 76 mGy-cm  (accession [id number]), 405 28 mGy-cm  (accession [id number])  This examination, like all CT scans performed in the [facility], was performed utilizing techniques to    minimize radiation dose exposure, including the use of iterative reconstruction and automated exposure control  IMAGE QUALITY:  Diagnostic  FINDINGS:      ALIGNMENT:  Normal alignment of the cervical spine  No subluxation  VERTEBRAL BODIES:  No acute fracture  DEGENERATIVE CHANGES:  Intervertebral disc space narrowing at C5/C6 with multilevel anterior, marginal, and uncovertebral osteophytosis  Multilevel facet joint arthropathy  Partial ankylosis of the facets at C3/C4 on the left  PREVERTEBRAL AND PARASPINAL SOFT TISSUES:  Unremarkable        THORACIC INLET:  Mild biapical pleural/parenchymal scarring  IMPRESSION:      Degenerative changes as described no evidence of acute cervical spine injury  Workstation performed: [id number]         CT spine cervical without contrast   Final Result by [address] [place] [address] [facility], DO (07/09 0056)      Small right frontal scalp hematomas but no calvarial fracture or acute intracranial process is seen  Other findings as above  CT CERVICAL SPINE - WITHOUT CONTRAST      INDICATION:   Head trauma, minor (Age >= 65y)   abrasion L scalp  COMPARISON:  None  TECHNIQUE:  CT examination of the cervical spine was performed without intravenous contrast   Contiguous axial images were obtained  Sagittal and coronal reconstructions were performed  Radiation dose length product (DLP) for this visit:  703 76 mGy-cm  (accession [id number]), 405 28 mGy-cm  (accession [id number])  This examination, like all CT scans performed in the [facility], was performed utilizing techniques to    minimize radiation dose exposure, including the use of iterative reconstruction and automated exposure control  IMAGE QUALITY:  Diagnostic  FINDINGS:      ALIGNMENT:  Normal alignment of the cervical spine  No subluxation  VERTEBRAL BODIES:  No acute fracture  DEGENERATIVE CHANGES:  Intervertebral disc space narrowing at C5/C6 with multilevel anterior, marginal, and uncovertebral osteophytosis  Multilevel facet joint arthropathy  Partial ankylosis of the facets at C3/C4 on the left  PREVERTEBRAL AND PARASPINAL SOFT TISSUES:  Unremarkable  THORACIC INLET:  Mild biapical pleural/parenchymal scarring  IMPRESSION:      Degenerative changes as described no evidence of acute cervical spine injury                        Workstation performed: [id number]         XR chest 1 view portable   ED Interpretation by Maddy Betancourt III, DO (07/09 8905)   Portable chest x-ray shows no acute fractures, no osseous abnormalities, no pneumothorax, no obvious infiltrates  Allscripts / Epic Records Reviewed: Yes     ** Please Note: This note has been constructed using a voice recognition system   **

## 2022-07-09 NOTE — CASE MANAGEMENT
Case Management Assessment & Discharge Planning Note    Patient name Jagjit Escamilla  Location ED 21/ED 21 MRN [id number]  : 1955 Date 2022       Current Admission Date: 2022  Current Admission Diagnosis:Pneumonia due to COVID-19 virus   Patient Active Problem List    Diagnosis Date Noted    Pneumonia due to COVID-19 virus 2022    Fall 2022    Hypotension 2022    Goals of care, counseling/discussion 2022    Medicare annual wellness visit, subsequent 2022    Decreased appetite 2022    Need for immunization against influenza 10/15/2021    Slow transit constipation 2021    Driving safety issue 2021    [facility]    Malignant melanoma of skin ([facility] ) 2016    Cough 2016    Anxiety 2014    Backache 2014    Conductive hearing loss 2014    Mixed hyperlipidemia 10/08/2013    Obstructive sleep apnea syndrome 10/08/2013    Symptoms involving urinary system 2013      LOS (days): 0  Geometric Mean LOS (GMLOS) (days): 5 40  Days to GMLOS:5 2     OBJECTIVE:    Risk of Unplanned Readmission Score: 11 86      Current admission status: Inpatient  Referral Reason: Hospice    Preferred Pharmacy:   [facility] [address], [address]  [address] [address]  Phone: [phone] Fax: [phone]    [place], [address]  [address] [address]  Phone: [phone] Fax: [facility], [address] [facility] [address] [facility]  [address] [address]  Phone: [phone] Fax: [phone]    [address]  [address]  [facility] [address]  Phone: [phone] Fax: [phone]    Primary Care Provider: Renetta Valeroi MD    Primary Insurance: MEDICARE  Secondary Insurance: COMMERCIAL MISCELLANEOUS    ASSESSMENT:  Active Health Care Proxies    There are no active Health Care Proxies on file  Advance Directives  Does patient have a [address]?: Yes (Copy obtained and provided to media)  Does patient have Advance Directives?: Yes (Copy obtained and provided to media)  Advance Directives: Living will, Power of  for health care, Power of  for finance  Primary Contact: Arabella Finch (Spouse)   [phone] (Home Phone)    Patient Information  Admitted from[de-identified] Facility  Mental Status: Confused  Assessment information provided by[de-identified] Other - please comment ([facility])  Primary Caregiver: Other (Comment) (Staff at [facility])  [address] Name[de-identified] Memory Care Unit at [facility] [place] [address] Relationship to Patient[de-identified] Other (Specify) (Memory Care Unit at [facility])  Caregiver's Telephone Number[de-identified] [phone]  Support Systems: Other (Comment) (Staff at [facility] dementia [facility])  [place] of Residence: [address] do you live in?: [address] entry access options  Select all that apply : No steps to enter home  Type of Current Residence: Facility  Upon entering residence, is there a bedroom on the main floor (no further steps)?: Yes  Upon entering residence, is there a bathroom on the main floor (no further steps)?: Yes  In the last 12 months, was there a time when you were not able to pay the mortgage or rent on time?: Yes  In the last 12 months, how many places have you lived?: 2  In the last 12 months, was there a time when you did not have a steady place to sleep or slept in a shelter (including now)?: No  Homeless/housing insecurity resource given?: N/A  Living Arrangements: Other (Comment) (Facility)    Activities of Daily Living Prior to Admission  Functional Status:  Total dependent  Completes ADLs independently?: No  Level of ADL dependence: Assistance  Ambulates independently?: Yes  Level of ambulatory dependence: Assistance  Does patient use assisted devices?: No  Does patient currently own DME?: No  Does patient have a history of Outpatient Therapy (PT/OT)?: No  Does the patient have a history of Short-Term Rehab?: No  Does patient have a history of HHC?: No  Does patient currently have Maude Costello?: No    Patient Information Continued  Income Source: Pension/FPC  Does patient have prescription coverage?: Yes  Within the past 12 months, you worried that your food would run out before you got the money to buy more : Never true  Within the past 12 months, the food you bought just didn't last and you didn't have money to get more : Never true  Food insecurity resource given?: N/A  Does patient receive dialysis treatments?: No  Does patient have a history of substance abuse?: No  Does patient have a history of Mental Health Diagnosis?: No    Means of Transportation  Means of Transport to Appts[de-identified] Family transport  In the past 12 months, has lack of transportation kept you from medical appointments or from getting medications?: No  In the past 12 months, has lack of transportation kept you from meetings, work, or from getting things needed for daily living?: No  Was application for public transport provided?: N/A    DISCHARGE DETAILS:    Discharge planning discussed with[de-identified] Wife and 2 daughters    Other Referral/Resources/Interventions Provided:  Interventions: Other (Specify) (DC disposition TBD)     Additional Comments: Call to Yael Chamberlain at [facility] ([phone]) and recieved additional information  PT/OT/ST working with patient at facility  Yael Chamberlain faxed med list current weights and progress not copy provided to media and hospice liaison   Ambulates independently,  Wonders  Confused  Dependent with ADL's  Pockets food on a Mechainical soft diet does best with finger foods  Admitted 1 month ago to Dementia Unit from home  [address] met with patient and family at bedside   Hailey discussed hospice eval with WINNIE Hart from [facility]   Patient to recieve treatment for COVID and hospice will following durning admission  Discussed hospice with liaison and family and will start SNF bed searches for possible DC to SNF with hospice care  Ben Rollins also discussed return to [facility] with Hospice Care  CM department following thru discharge

## 2022-07-09 NOTE — ED NOTES
Called [facility] to give an update on patients status  Nurse was not on duty and will call around 6:30 when she gets on shift per caretaker spoken too       Gretchen Silva  07/09/22 9861

## 2022-07-09 NOTE — ASSESSMENT & PLAN NOTE
· Presented for fall  · Found to be COVID positive  · CT chest PE study: Scattered groundglass infiltrates, most pronounced right lower lobe  Correlate for multifocal/multi lobar pneumonia  No CT evidence of pulmonary emboli  · Currently on room air, saturating 97%  · D-dimer:  0 87  · Reached out to hospice via [facility] text, they are seeing if someone can, up to [place] today  If they are unable to consult today, daughter would like to try remdesivir and steroids for comfort measures  Per hospice, these would be discontinued when he is accepted to hospice care    Family would like to pursue hospice at home, although he currently resides in a memory care unit  · Monitor oxygen saturation  · Level 4 comfort care per family

## 2022-07-09 NOTE — ED NOTES
Wedge placed under right shoulder for repositioning  Family remains at bedside       Vega Cummings RN  07/09/22 3911

## 2022-07-09 NOTE — ED PROVIDER NOTES
History  Chief Complaint   Patient presents with    Fall     Per EMS, unwitnessed fall from the [facility]; hx dementia; headstrike to right side     HPI    This is a 68-year-old gentleman presents emergency department from a local skilled nursing facility dementia unit, unwitnessed fall, not on blood thinners, unclear when the patient fell, or the circumstances in which the patient was found  As per nursing home staff patient appeared to be more sleepy than usual   Prior to Admission Medications   Prescriptions Last Dose Informant Patient Reported? Taking?    OLANZapine (ZyPREXA) 2 5 mg tablet   No No   Sig: TAKE 1 TABLET BY MOUTH AT BEDTIME   citalopram (CeleXA) 40 mg tablet  Spouse/Significant Other No No   Sig: Take 1 tablet (40 mg total) by mouth daily   donepezil (ARICEPT) 10 mg tablet  Spouse/Significant Other No No   Sig: Take 1 tablet (10 mg total) by mouth 2 (two) times a day   Patient taking differently: Take 10 mg by mouth 2 (two) times a day   fluticasone (FLONASE) 50 mcg/act nasal spray  Spouse/Significant Other No No   Sig: SPRAY 2 SPRAYS INTO EACH NOSTRIL EVERY DAY   Patient taking differently: if needed   memantine (NAMENDA) 10 mg tablet  Spouse/Significant Other No No   Sig: Take 1 tablet (10 mg total) by mouth 2 (two) times a day   polyethylene glycol (GLYCOLAX) 17 GM/SCOOP powder   No No   Sig: Take 17 g by mouth daily as needed (constipation)   pravastatin (PRAVACHOL) 40 mg tablet   No No   Sig: Take 1 tablet (40 mg total) by mouth daily      Facility-Administered Medications: None       Past Medical History:   Diagnosis Date    Anxiety     BPH with obstruction/lower urinary tract symptoms     LAST ASSESSED: 9/15/14    Colon polyp     Dementia (HCC)     Depression     Hx of laceration of skin     LAST ASSESSED: 11/3/14    Hyperlipidemia     Memory loss     Pneumonia     LAST ASSESSED: 5/12/14    Sleep apnea     Tachycardia     LAST ASSESSED: 3/8/14    Thrombophlebitis of superficial veins of upper extremities     LAST ASSESSED: 06    Uncomplicated alcohol abuse     LAST ASSESSED: 2/28/15    Weakness of both arms     LAST ASSESSED: 14    Weakness of both legs     LAST ASSESSED: 14       Past Surgical History:   Procedure Laterality Date    COLONOSCOPY      COMPLETE    HERNIA REPAIR      SKIN LESION EXCISION      chest melanoma       Family History   Problem Relation Age of Onset    Arthritis Mother     Hyperlipidemia Mother     Dementia Mother         started late 76s    Tinnitus Mother     Hypertension Mother     Arthritis Family     Hyperlipidemia Family     Anxiety disorder Sister      I have reviewed and agree with the history as documented  E-Cigarette/Vaping    E-Cigarette Use Never User      E-Cigarette/Vaping Substances     Social History     Tobacco Use    Smoking status: Former Smoker     Types: Cigarettes     Quit date:      Years since quittin 5    Smokeless tobacco: Never Used    Tobacco comment: quit 20 years ago   Vaping Use    Vaping Use: Never used   Substance Use Topics    Alcohol use: Yes    Drug use: No       Review of Systems   Unable to perform ROS: Patient nonverbal   Skin: Negative  Physical Exam  Physical Exam  Vitals and nursing note reviewed  Constitutional:       General: He is in acute distress  Appearance: He is ill-appearing  HENT:      Head: Normocephalic  Right Ear: Ear canal and external ear normal       Left Ear: Ear canal and external ear normal       Nose: Nose normal       Mouth/Throat:      Mouth: Mucous membranes are moist       Pharynx: Oropharynx is clear  Eyes:      Extraocular Movements: Extraocular movements intact  Conjunctiva/sclera: Conjunctivae normal       Pupils: Pupils are equal, round, and reactive to light  Cardiovascular:      Rate and Rhythm: Regular rhythm  Tachycardia present  Pulses: Normal pulses  Heart sounds: Normal heart sounds     Pulmonary: Effort: Pulmonary effort is normal       Breath sounds: Normal breath sounds  Abdominal:      General: Abdomen is flat  Bowel sounds are normal    Musculoskeletal:         General: No swelling, tenderness or deformity  Cervical back: Normal range of motion  Skin:     General: Skin is warm  Capillary Refill: Capillary refill takes less than 2 seconds  Neurological:      General: No focal deficit present  Mental Status: He is oriented to person, place, and time  Mental status is at baseline     Psychiatric:         Mood and Affect: Mood normal          Vital Signs  ED Triage Vitals   Temperature Pulse Respirations Blood Pressure SpO2   07/09/22 0242 07/09/22 0152 07/09/22 0152 07/09/22 0152 07/09/22 0152   97 7 °F (36 5 °C) 63 18 90/55 99 %      Temp src Heart Rate Source Patient Position - Orthostatic VS BP Location FiO2 (%)   -- 07/09/22 0152 07/09/22 0152 07/09/22 0152 --    Monitor Sitting Left arm       Pain Score       --                  Vitals:    07/09/22 0415 07/09/22 0515 07/09/22 0530 07/09/22 0655   BP: 93/56 112/59 115/62 90/55   Pulse: (!) 53 64 69    Patient Position - Orthostatic VS: Lying Lying Lying          Visual Acuity      ED Medications  Medications   sodium chloride 0 9 % bolus 1,000 mL (0 mL Intravenous Stopped 7/9/22 0359)     Followed by   sodium chloride 0 9 % bolus 1,000 mL (0 mL Intravenous Stopped 7/9/22 0524)     Followed by   sodium chloride 0 9 % bolus 1,000 mL (0 mL Intravenous Stopped 7/9/22 0632)   cefepime (MAXIPIME) IVPB (premix in dextrose) 2,000 mg 50 mL (0 mg Intravenous Stopped 7/9/22 0442)   vancomycin (VANCOCIN) IVPB (premix in dextrose) 750 mg 150 mL (0 mg/kg × 49 7 kg Intravenous Stopped 7/9/22 0609)   iohexol (OMNIPAQUE) 350 MG/ML injection (MULTI-DOSE) 70 mL (70 mL Intravenous Given 7/9/22 0508)   LORazepam (ATIVAN) injection 0 5 mg (0 5 mg Intravenous Given 7/9/22 0706)       Diagnostic Studies  Results Reviewed     Procedure Component Value Units Date/Time    UA w Reflex to Microscopic w Reflex to Culture [[id number]]  (Abnormal) Collected: 07/09/22 0802    Lab Status: Final result Specimen: Urine, Straight Cath Updated: 07/09/22 0807     Color, UA Yellow     Clarity, UA Clear     Specific [place], UA 1 020     pH, UA 6 0     Leukocytes, UA Negative     Nitrite, UA Negative     Protein, UA Negative mg/dl      Glucose, UA Negative mg/dl      Ketones, UA Negative mg/dl      Urobilinogen, UA 0 2 E U /dl      Bilirubin, UA Negative     Occult Blood, UA Trace-Intact    Urine Microscopic [[id number]] Collected: 07/09/22 0802    Lab Status: In process Specimen: Urine, Straight Cath Updated: 07/09/22 0807    D-dimer, quantitative [[id number]]  (Abnormal) Collected: 07/09/22 0210    Lab Status: Final result Specimen: Blood from Arm, Right Updated: 07/09/22 0404     D-Dimer, Quant 0 87 ug/ml FEU     Narrative: In the evaluation for possible pulmonary embolism, in the appropriate (Well's Score of 4 or less) patient, the age adjusted d-dimer cutoff for this patient can be calculated as:    Age x 0 01 (in ug/mL) for Age-adjusted D-dimer exclusion threshold for a patient over 50 years  COVID/FLU/RSV [[id number]]  (Abnormal) Collected: 07/09/22 0228    Lab Status: Final result Specimen: Nares from Nose Updated: 07/09/22 0323     SARS-CoV-2 Positive     INFLUENZA A PCR Negative     INFLUENZA B PCR Negative     RSV PCR Negative    Narrative:      FOR PEDIATRIC PATIENTS - copy/paste COVID Guidelines URL to browser: [email or web address]  [email or web address]    SARS-CoV-2 assay is a Nucleic Acid Amplification assay intended for the  qualitative detection of nucleic acid from SARS-CoV-2 in nasopharyngeal  swabs  Results are for the presumptive identification of SARS-CoV-2 RNA      Positive results are indicative of infection with SARS-CoV-2, the virus  causing COVID-19, but do not rule out bacterial infection or co-infection  with other viruses  Laboratories within the [place] and its  territories are required to report all positive results to the appropriate  public health authorities  Negative results do not preclude SARS-CoV-2  infection and should not be used as the sole basis for treatment or other  patient management decisions  Negative results must be combined with  clinical observations, patient history, and epidemiological information  This test has not been FDA cleared or approved  This test has been authorized by FDA under an Emergency Use Authorization  (EUA)  This test is only authorized for the duration of time the  declaration that circumstances exist justifying the authorization of the  emergency use of an in vitro diagnostic tests for detection of SARS-CoV-2  virus and/or diagnosis of COVID-19 infection under section 564(b)(1) of  the Act, 21 U  S C  542EYO-2(J)(6), unless the authorization is terminated  or revoked sooner  The test has been validated but independent review by FDA  and CLIA is pending  Test performed using [employer] GeneXpert: This RT-PCR assay targets N2,  a region unique to SARS-CoV-2  A conserved region in the E-gene was chosen  for pan-Sarbecovirus detection which includes SARS-CoV-2      Procalcitonin [[id number]]  (Normal) Collected: 07/09/22 0210    Lab Status: Final result Specimen: Blood from Arm, Right Updated: 07/09/22 0247     Procalcitonin 0 07 ng/ml     Comprehensive metabolic panel [[id number]]  (Abnormal) Collected: 07/09/22 0210    Lab Status: Final result Specimen: Blood from Arm, Right Updated: 07/09/22 0240     Sodium 137 mmol/L      Potassium 3 4 mmol/L      Chloride 101 mmol/L      CO2 29 mmol/L      ANION GAP 7 mmol/L      BUN 28 mg/dL      Creatinine 0 84 mg/dL      Glucose 85 mg/dL      Calcium 9 0 mg/dL       U/L      ALT 54 U/L      Alkaline Phosphatase 53 U/L      Total Protein 6 0 g/dL      Albumin 3 7 g/dL      Total Bilirubin 0 58 mg/dL      eGFR 89 ml/min/1 73sq m Narrative:      National Kidney Disease Foundation guidelines for Chronic Kidney Disease (CKD):     Stage 1 with normal or high GFR (GFR > 90 mL/min/1 73 square meters)    Stage 2 Mild CKD (GFR = 60-89 mL/min/1 73 square meters)    Stage 3A Moderate CKD (GFR = 45-59 mL/min/1 73 square meters)    Stage 3B Moderate CKD (GFR = 30-44 mL/min/1 73 square meters)    Stage 4 Severe CKD (GFR = 15-29 mL/min/1 73 square meters)    Stage 5 End Stage CKD (GFR <15 mL/min/1 73 square meters)  Note: GFR calculation is accurate only with a steady state creatinine    Lactic acid [[id number]]  (Normal) Collected: 07/09/22 0210    Lab Status: Final result Specimen: Blood from Arm, Right Updated: 07/09/22 0240     LACTIC ACID 0 9 mmol/L     Narrative:      Result may be elevated if tourniquet was used during collection      Lipase [[id number]]  (Normal) Collected: 07/09/22 0210    Lab Status: Final result Specimen: Blood from Arm, Right Updated: 07/09/22 0240     Lipase 15 u/L     Magnesium [[id number]]  (Normal) Collected: 07/09/22 0210    Lab Status: Final result Specimen: Blood from Arm, Right Updated: 07/09/22 0240     Magnesium 2 0 mg/dL     Protime-INR [[id number]]  (Normal) Collected: 07/09/22 0210    Lab Status: Final result Specimen: Blood from Arm, Right Updated: 07/09/22 0232     Protime 12 6 seconds      INR 0 95    APTT [[id number]]  (Normal) Collected: 07/09/22 0210    Lab Status: Final result Specimen: Blood from Arm, Right Updated: 07/09/22 0232     PTT 33 seconds     CBC and differential [[id number]]  (Abnormal) Collected: 07/09/22 0210    Lab Status: Final result Specimen: Blood from Arm, Right Updated: 07/09/22 0218     WBC 3 80 Thousand/uL      RBC 4 32 Million/uL      Hemoglobin 13 1 g/dL      Hematocrit 39 1 %      MCV 91 fL      MCH 30 3 pg      MCHC 33 5 g/dL      RDW 12 4 %      MPV 9 7 fL      Platelets 412 Thousands/uL      nRBC 0 /100 WBCs      Neutrophils Relative 52 %      Immat GRANS % 0 %      Lymphocytes Relative 32 %      Monocytes Relative 15 %      Eosinophils Relative 1 %      Basophils Relative 0 %      Neutrophils Absolute 1 96 Thousands/µL      Immature Grans Absolute 0 01 Thousand/uL      Lymphocytes Absolute 1 21 Thousands/µL      Monocytes Absolute 0 56 Thousand/µL      Eosinophils Absolute 0 05 Thousand/µL      Basophils Absolute 0 01 Thousands/µL     Blood culture #2 [[id number]] Collected: 07/09/22 0210    Lab Status: In process Specimen: Blood from Arm, Right Updated: 07/09/22 0216    Blood culture #1 [[id number]] Collected: 07/09/22 0212    Lab Status: In process Specimen: Blood from Arm, Right Updated: 07/09/22 0216                 PE Study with CT Abdomen and Pelvis with contrast   Final Result by Surekha Degroot DO (07/09 0715)   1  Suboptimal examination due to beam hardening artifact from imaging the patient with his arms at his side and overlying the lower pelvis  2   Scattered groundglass infiltrates, most pronounced right lower lobe  Correlate for multifocal/multi lobar pneumonia  In the setting of clinically suspected/proven COVID-19, the above lung parenchymal findings on CT indicate intermediate confidence    level for COVID-19       3   Moderate constipation and fecal impaction  Mild abnormal appearance of the rectum, suspicious for stercoral proctitis  Follow-up GI and/or surgical consultation recommended  4   No CT evidence of pulmonary emboli  Workstation performed: [id number]         CT head without contrast   Final Result by Garrett Gillespie DO (07/09 9287)      Small right frontal scalp hematomas but no calvarial fracture or acute intracranial process is seen  Other findings as above  CT CERVICAL SPINE - WITHOUT CONTRAST      INDICATION:   Head trauma, minor (Age >= 65y)   abrasion L scalp  COMPARISON:  None        TECHNIQUE:  CT examination of the cervical spine was performed without intravenous contrast   Contiguous axial images were obtained  Sagittal and coronal reconstructions were performed  Radiation dose length product (DLP) for this visit:  703 76 mGy-cm  (accession [id number]), 405 28 mGy-cm  (accession [id number])  This examination, like all CT scans performed in the [facility], was performed utilizing techniques to    minimize radiation dose exposure, including the use of iterative reconstruction and automated exposure control  IMAGE QUALITY:  Diagnostic  FINDINGS:      ALIGNMENT:  Normal alignment of the cervical spine  No subluxation  VERTEBRAL BODIES:  No acute fracture  DEGENERATIVE CHANGES:  Intervertebral disc space narrowing at C5/C6 with multilevel anterior, marginal, and uncovertebral osteophytosis  Multilevel facet joint arthropathy  Partial ankylosis of the facets at C3/C4 on the left  PREVERTEBRAL AND PARASPINAL SOFT TISSUES:  Unremarkable  THORACIC INLET:  Mild biapical pleural/parenchymal scarring  IMPRESSION:      Degenerative changes as described no evidence of acute cervical spine injury  Workstation performed: [id number]         CT spine cervical without contrast   Final Result by Stacey Loyola DO (07/09 9373)      Small right frontal scalp hematomas but no calvarial fracture or acute intracranial process is seen  Other findings as above  CT CERVICAL SPINE - WITHOUT CONTRAST      INDICATION:   Head trauma, minor (Age >= 65y)   abrasion L scalp  COMPARISON:  None  TECHNIQUE:  CT examination of the cervical spine was performed without intravenous contrast   Contiguous axial images were obtained  Sagittal and coronal reconstructions were performed  Radiation dose length product (DLP) for this visit:  703 76 mGy-cm  (accession [id number]), 405 28 mGy-cm  (accession [id number])    This examination, like all CT scans performed in the [facility], was performed utilizing techniques to    minimize radiation dose exposure, including the use of iterative reconstruction and automated exposure control  IMAGE QUALITY:  Diagnostic  FINDINGS:      ALIGNMENT:  Normal alignment of the cervical spine  No subluxation  VERTEBRAL BODIES:  No acute fracture  DEGENERATIVE CHANGES:  Intervertebral disc space narrowing at C5/C6 with multilevel anterior, marginal, and uncovertebral osteophytosis  Multilevel facet joint arthropathy  Partial ankylosis of the facets at C3/C4 on the left  PREVERTEBRAL AND PARASPINAL SOFT TISSUES:  Unremarkable  THORACIC INLET:  Mild biapical pleural/parenchymal scarring  IMPRESSION:      Degenerative changes as described no evidence of acute cervical spine injury  Workstation performed: [id number]         XR chest 1 view portable   ED Interpretation by Jenifer Lugo III, DO (07/09 8459)   Portable chest x-ray shows no acute fractures, no osseous abnormalities, no pneumothorax, no obvious infiltrates  Procedures  ECG 12 Lead Documentation Only    Date/Time: 7/9/2022 1:59 AM  Performed by: Fide Hi DO  Authorized by: Jenifer Lugo III, DO     Indications / Diagnosis:  Altered mental status  ECG reviewed by me, the ED Provider: yes    Patient location:  ED  Comments:      I personally reviewed this EKG is performed the patient July 9, 2022, EKG was completed at 1:50 a m   Interpreted by me at 1:59 a m   Sinus bradycardia with a ventricular rate of 55 beats per minute, remaining portion of intervals within normal limits  No diffuse elevations to indicate pericarditis  No coved ST elevations greater than 2mm with negative T waves in V1-3 to indicate concern for brugada  No biphasic T waves in V2, V3 to indicate Wellens (critical stenosis of LAD)     No elevation in aVR or deviation when compared to V1 (can be associated with ST depression in I,II, V4-6 when left main occlusion is present)  CriticalCare Time  Performed by: Macy Sams DO  Authorized by: Macy Sams DO     Critical care provider statement:     Critical care time (minutes):  35    Critical care start time:  7/9/2022 4:00 AM    Critical care end time:  7/9/2022 6:50 AM    Critical care time was exclusive of:  Separately billable procedures and treating other patients    Critical care was necessary to treat or prevent imminent or life-threatening deterioration of the following conditions:  Circulatory failure, respiratory failure, renal failure, CNS failure or compromise and dehydration  Comments:      Please see ED course for specifics,             ED Course  ED Course as of 07/09/22 0807   Sat Jul 09, 2022   0351 Upon reassessment, noted the patient has a blood pressure of 79 systolic, with normal lactic acid levels and procalcitonin levels, will proceed with CT imaging chest abdomen pelvis PE protocol because of the hypotension initial hypoxia  9694 Patient was assessed by CATHRYN Martinez, from ICU, patient is stable for the floor due to the fact the patient is now a DNR DNI and the wife is requesting once patient is discharged from the hospital be placed on hospice  2829 Case d/w daughter Minh Saini, is aware of the patient's condition, borderline blood pressure and low heart rate, wife re-confirmed patient's code status, patient is currently in the CT scan imaging suite  2968 Had a long conversation with both the daughter and the wife at the bedside, patient's blood pressure drifted down shortly to 80/ 50, room repeated by me 90 systolic, bounding radial pulse    Patient's attempt to get out of bed multiple times explained to the family that if the patient continues to have a low blood pressure options would be a central access with pressor management, they declined this realizing that this is considered evasive and they were requesting patient be placed on hospice  CT imaging pending, urinalysis pending at this point  Hospitalist service is aware of the patient requesting disposition after CT imaging  SBIRT 20yo+    Flowsheet Row Most Recent Value   SBIRT (25 yo +)    In order to provide better care to our patients, we are screening all of our patients for alcohol and drug use  Would it be okay to ask you these screening questions? Unable to answer at this time Filed at: 07/09/2022 0156                    MDM  Number of Diagnoses or Management Options  Fall  Head contusion  Hypotension  Pneumonia due to COVID-19 virus  Diagnosis management comments: 77-year-old gentleman presents emergency department from [facility] nursing facility, noted to have a low white count and increased LFTs, COVID testing occurred, positive, patient became hypotensive, IV fluid hydration initiated 30 cc kilos, sepsis alert called prior to patient known to be covid +, CT the head, cervical spine negative, CTA of the chest abdomen pelvis PE protocol showed ground-glass opacities consistent with COVID pneumonia but did not show a pulmonary embolism  Please see ED course for extensive conversation both the wife and the daughter at the bedside about the patient's code status, wife early on in the ER visit noted that the patient has a living will and is a DNR DNI and requested the patient be transferred to a hospice living arrangement once patient is discharged from the hospital   During the ER visit the patient's pressure dropped down into the mid 76s, family is aware that intervention would be needed to raise the patient's blood pressure via pressor support through eye central IV access, they declined aware of the risks and benefits of this procedure is felt that the is a invasive treatment modality and requests the patient be made comfortable  This was related to the hospitalist service      Portions of the record may have been created with voice recognition software  Occasional wrong word or "sound a like" substitutions may have occurred due to the inherent limitations of voice recognition software  Read the chart carefully and recognize, using context, where substitutions have occurred  Amount and/or Complexity of Data Reviewed  Clinical lab tests: ordered and reviewed  Tests in the radiology section of CPT®: ordered and reviewed  Tests in the medicine section of CPT®: ordered and reviewed  Discussion of test results with the performing providers: yes  Decide to obtain previous medical records or to obtain history from someone other than the patient: yes  Obtain history from someone other than the patient: yes  Review and summarize past medical records: yes  Independent visualization of images, tracings, or specimens: yes        Disposition  Final diagnoses:   Fall   Head contusion   Hypotension   Pneumonia due to COVID-19 virus     Time reflects when diagnosis was documented in both MDM as applicable and the Disposition within this note     Time User Action Codes Description Comment    7/9/2022  7:12 AM Unk Moros Add [K56 7] Ileus ([address] )     7/9/2022  7:13 AM Unk Moros Add [R10 9] Abdominal pain     7/9/2022  7:13 AM Unk Moros Add [R11 10] Vomiting     7/9/2022  7:14 AM Unk Moros Modify [R10 9] Abdominal pain     7/9/2022  7:14 AM Unk Moros Remove [K56 7] Ileus ([address] )     7/9/2022  7:14 AM Unk Moros Remove [R11 10] Vomiting     7/9/2022  7:14 AM Unk Moros Remove [R10 9] Abdominal pain     7/9/2022  7:41 AM Unk Moros Add [B37  PDQE] Fall     7/9/2022  7:41 AM Unk Moros Add [X07 07PE] Head contusion     7/9/2022  7:41 AM Unk Moros Add [I95 9] Hypotension     7/9/2022  7:41 AM Unk Moros Add [U07 1,  J12 82] Pneumonia due to COVID-19 virus       ED Disposition     ED Disposition   Admit    Condition   Stable    Date/Time   Sat Jul 9, 2022  7:13 AM    Comment   Case was discussed with Dr Belia Carlisle and the patient's admission status was agreed to be Admission Status: observation status to the service of Dr Estela Chappell   Follow-up Information    None         Patient's Medications   Discharge Prescriptions    No medications on file       No discharge procedures on file      PDMP Review       Value Time User    PDMP Reviewed  Yes 8/5/2021  4:00 PM Angelo Caro MD          ED Provider  Electronically Signed by           Shahab Faith III, DO  07/09/22 8362

## 2022-07-09 NOTE — ED NOTES
Patient brief changed and repositioned  Family remains at bedside       Dorene John RN  07/09/22 4463

## 2022-07-09 NOTE — ED NOTES
Family reporting that patient is thirsty  Cup of water with ice and swabs given to family, to provide to patient  Family expresses understanding and agreeable to swabbing patient for comfort         Brianna Braxton RN  07/09/22 2461

## 2022-07-10 NOTE — ASSESSMENT & PLAN NOTE
· Hypotensive on arrival; family declined invasive measures than chest central line in vasopressors  · Family requested supportive IV fluids for hydration but understands these therapies will be discontinued upon discharge to skilled nursing facility with hospice

## 2022-07-10 NOTE — ASSESSMENT & PLAN NOTE
· Unwitnessed fall at [facility] care unit  · CT Brain (7/9): Small right frontal scalp hematomas but no calvarial fracture or acute intracranial process is seen   · CT C-spine (7/9): Degenerative changes with no evidence of acute cervical spine injury   · Pain control with Dilaudid 0 2 mg q 4 hours  · Fall precautions and continuous observation

## 2022-07-10 NOTE — ASSESSMENT & PLAN NOTE
· Presented for fall at memory care unit and found to beCOVID positive  · CTA Chest (7/9): Scattered groundglass infiltrates, most pronounced right lower lobe  No CT evidence of pulmonary emboli  · Though the patient isn't hypoxic and family has elected CMO with plans for DC to SNF with Hospice, they would like to continue supporitve treatment with Remdesivir and Decadron for comfort related to SOB from COVID infection  They understand that once discharged to SNF with Hospice that these therapies will be discontinued  · Was evaluated by Hospice liaison and does not qualify for hospice house but acceptable for SNF with Hospice or home with Hospice- the family has elected to proceed with SNF at this time  · CM following to aide in placement at this time

## 2022-07-10 NOTE — PROGRESS NOTES
Isabela 43  Progress Note Hettie [place] 1955, 69 y o  male MRN: [id number]  Unit/Bed#: -01 Encounter: [id number]  Primary Care Provider: Jacquelyne Epley, MD   Date and time admitted to hospital: 7/9/2022  1:50 AM    * Pneumonia due to COVID-19 virus  Assessment & Plan  · Presented for fall at memory care unit and found to beCOVID positive  · CTA Chest (7/9): Scattered groundglass infiltrates, most pronounced right lower lobe  No CT evidence of pulmonary emboli  · Though the patient isn't hypoxic and family has elected CMO with plans for DC to SNF with Hospice, they would like to continue supporitve treatment with Remdesivir and Decadron for comfort related to SOB from COVID infection  They understand that once discharged to SNF with Hospice that these therapies will be discontinued  · Was evaluated by Hospice liaison and does not qualify for hospice house but acceptable for SNF with Hospice or home with Hospice- the family has elected to proceed with SNF at this time  · CM following to aide in placement at this time  Fall  Assessment & Plan  · Unwitnessed fall at memory care unit  · CT Brain (7/9): Small right frontal scalp hematomas but no calvarial fracture or acute intracranial process is seen   · CT C-spine (7/9): Degenerative changes with no evidence of acute cervical spine injury   · Pain control with Dilaudid 0 2 mg q 4 hours  · Fall precautions and continuous observation    Hypotension  Assessment & Plan  · Hypotensive on arrival; family declined invasive measures than chest central line in vasopressors  · Family requested supportive IV fluids for hydration but understands these therapies will be discontinued upon discharge to skilled nursing facility with hospice  Slow transit constipation  Assessment & Plan  · CT abd/pelvis (7/9): Moderate constipation and fecal impaction  Mild abnormal appearance of the rectum, suspicious for stercoral proctitis  Follow-up GI and/or surgical consultation recommended  · Dulcolax suppository per comfort orders      Dementia with behavioral disturbance (HCC)  Assessment & Plan  · Previously resides at [facility] to in memory care unit  · Ativan 0 5 mg q 4 hours as needed for agitation  · Continuous observation as above      VTE Pharmacologic Prophylaxis:   CMO    Patient Centered Rounds: I performed bedside rounds with nursing staff today  Discussions with Specialists or Other Care Team Provider: Nursing and CM    Education and Discussions with Family / Patient: CM and Hospice liaison discussed discharge plans with the patient's family        Time Spent for Care: 30 minutes  More than 50% of total time spent on counseling and coordination of care as described above  Current Length of Stay: 1 day(s)  Current Patient Status: Inpatient   Certification Statement: The patient will continue to require additional inpatient hospital stay due to Awaiting placement   Discharge Plan: Cleared for discharge once accepting facility is obtained  Code Status: Level 4 - Comfort Care    Subjective:   Patient appears to be resting comfortably  No significant overnight events reported by nursing  Objective:     Vitals:   Temp (24hrs), Av 4 °F (36 3 °C), Min:97 4 °F (36 3 °C), Max:97 5 °F (36 4 °C)    Temp:  [97 4 °F (36 3 °C)-97 5 °F (36 4 °C)] 97 4 °F (36 3 °C)  HR:  [54-75] 75  Resp:  [18-20] 20  BP: (102-117)/(63-68) 110/63  SpO2:  [99 %] 99 %  Body mass index is 16 66 kg/m²  Input and Output Summary (last 24 hours): Intake/Output Summary (Last 24 hours) at 7/10/2022 1310  Last data filed at 7/10/2022 0317  Gross per 24 hour   Intake 1000 ml   Output 400 ml   Net 600 ml       Physical Exam:   Physical Exam  Vitals and nursing note reviewed  Constitutional:       Appearance: He is ill-appearing  HENT:      Mouth/Throat:      Mouth: Mucous membranes are dry     Cardiovascular:      Rate and Rhythm: Normal rate and regular rhythm  Pulmonary:      Effort: No respiratory distress  Comments: Decreased breath sounds  Abdominal:      General: Bowel sounds are normal  There is no distension  Palpations: Abdomen is soft  Neurological:      Mental Status: Mental status is at baseline  Labs:  Results from last 7 days   Lab Units 07/09/22  0210   WBC Thousand/uL 3 80*   HEMOGLOBIN g/dL 13 1   HEMATOCRIT % 39 1   PLATELETS Thousands/uL 158   NEUTROS PCT % 52   LYMPHS PCT % 32   MONOS PCT % 15*   EOS PCT % 1     Results from last 7 days   Lab Units 07/09/22  0210   SODIUM mmol/L 137   POTASSIUM mmol/L 3 4*   CHLORIDE mmol/L 101   CO2 mmol/L 29   BUN mg/dL 28*   CREATININE mg/dL 0 84   ANION GAP mmol/L 7   CALCIUM mg/dL 9 0   ALBUMIN g/dL 3 7   TOTAL BILIRUBIN mg/dL 0 58   ALK PHOS U/L 53   ALT U/L 54*   AST U/L 155*   GLUCOSE RANDOM mg/dL 85     Results from last 7 days   Lab Units 07/09/22  0210   INR  0 95             Results from last 7 days   Lab Units 07/09/22  0210   LACTIC ACID mmol/L 0 9   PROCALCITONIN ng/ml 0 07       Lines/Drains:  Invasive Devices  Report    Peripheral Intravenous Line  Duration           Peripheral IV 07/09/22 Right Forearm 1 day              Imaging: No new imaging  Recent Cultures (last 7 days):   Results from last 7 days   Lab Units 07/09/22  0212 07/09/22  0210   BLOOD CULTURE  Received in Microbiology Lab  Culture in Progress  Received in Microbiology Lab  Culture in Progress         Last 24 Hours Medication List:   Current Facility-Administered Medications   Medication Dose Route Frequency Provider Last Rate    bisacodyl  10 mg Rectal Daily PRN TODD GermanNP      dexamethasone  6 mg Intravenous Q24H [place], [place]      HYDROmorphone  0 2 mg Intravenous Q4H PRN Mercedez Randall, CRNP      LORazepam  0 5 mg Intravenous Q4H PRN Mercedez TODD PereiraNP      remdesivir  100 mg Intravenous Q24H Mercedez TODD PereiraNP      sodium chloride  50 mL/hr Intravenous Continuous CATHRYN Jennings 50 mL/hr (07/10/22 1949)        Today, Patient Was Seen By: Kashmir Arboleda DO    **Please Note: This note may have been constructed using a voice recognition system  **

## 2022-07-10 NOTE — QUICK NOTE
In depth conversation was held with the patient's daughter and wife at the bedside  The patient's nurse, Minor Niece, was also present for some of the conversation  The family expressed concerns with continued fecal impaction, poor PO intake, and agitation  Goals of care were re-discussed  After review, they confirmed that they would like him to remain CMO with the priority being on pain/agitation control with the understanding that he would likely not be alert enough to eat regular meals  Invasive management or even oral medications (given lethargy and not accepting PO) for fecal impaction would not fall under CMO but enemas PRN would be within this plan of care  They were agreeable to this non-invasive plan with the understanding that if it did not improve that, again, our priority would be on controlling pain and not necessarily treating this underlying condition  Remdesivir and Decadron were also addressed and as these were unlikely to give significant improvement (espcially as he is not requiring supplemental oxygen), were discontinued  Plan remains to discharge to SNF with hospice when accepting facility is arranged

## 2022-07-10 NOTE — ASSESSMENT & PLAN NOTE
· Previously resides at pulmonary to in memory care unit  · Ativan 0 5 mg q 4 hours as needed for agitation  · Continuous observation as above

## 2022-07-10 NOTE — ASSESSMENT & PLAN NOTE
· CT abd/pelvis (7/9): Moderate constipation and fecal impaction  Mild abnormal appearance of the rectum, suspicious for stercoral proctitis  Follow-up GI and/or surgical consultation recommended     · Dulcolax suppository per comfort orders

## 2022-07-11 NOTE — PLAN OF CARE
Problem: Potential for Falls  Goal: Patient will remain free of falls  Description: INTERVENTIONS:  - Educate patient/family on patient safety including physical limitations  - Instruct patient to call for assistance with activity   - Consult OT/PT to assist with strengthening/mobility   - Keep Call bell within reach  - Keep bed low and locked with side rails adjusted as appropriate  - Keep care items and personal belongings within reach  - Initiate and maintain comfort rounds  - Make Fall Risk Sign visible to staff  - Offer Toileting every 2 Hours, in advance of need  - Initiate/Maintain bed alarm  - Obtain necessary fall risk management equipment: bed alarm, yellow socks, bed in lowest position, fall risk bracelet  - Apply yellow socks and bracelet for high fall risk patients  - Consider moving patient to room near nurses station  Outcome: Progressing  Problem: MOBILITY - ADULT  Goal: Maintain or return to baseline ADL function  Description: INTERVENTIONS:  -  Assess patient's ability to carry out ADLs; assess patient's baseline for ADL function and identify physical deficits which impact ability to perform ADLs (bathing, care of mouth/teeth, toileting, grooming, dressing, etc )  - Assess/evaluate cause of self-care deficits   - Assess range of motion  - Assess patient's mobility; develop plan if impaired  - Assess patient's need for assistive devices and provide as appropriate  - Encourage maximum independence but intervene and supervise when necessary  - Involve family in performance of ADLs  - Assess for home care needs following discharge   - Consider OT consult to assist with ADL evaluation and planning for discharge  - Provide patient education as appropriate  Outcome: Not Progressing  Goal: Maintains/Returns to pre admission functional level  Description: INTERVENTIONS:  - Perform BMAT or MOVE assessment daily    - Set and communicate daily mobility goal to care team and patient/family/caregiver     - Collaborate with rehabilitation services on mobility goals if consulted  - Perform Range of Motion 3 times a day  - Reposition patient every 2 hours    - Record patient progress and toleration of activity level   Outcome: Not Progressing        Problem: Prexisting or High Potential for Compromised Skin Integrity  Goal: Skin integrity is maintained or improved  Description: INTERVENTIONS:  - Identify patients at risk for skin breakdown  - Assess and monitor skin integrity  - Assess and monitor nutrition and hydration status  - Monitor labs   - Assess for incontinence   - Turn and reposition patient  - Assist with mobility/ambulation  - Relieve pressure over bony prominences  - Avoid friction and shearing  - Provide appropriate hygiene as needed including keeping skin clean and dry  - Evaluate need for skin moisturizer/barrier cream  - Collaborate with interdisciplinary team   - Patient/family teaching  - Consider wound care consult   Outcome: Progressing

## 2022-07-11 NOTE — PROGRESS NOTES
Isabela 43  Progress Note Juanis Solo 1955, 69 y o  male MRN: [id number]  Unit/Bed#: -01 Encounter: [id number]  Primary Care Provider: Gagan Kaminski MD   Date and time admitted to hospital: 7/9/2022  1:50 AM    * Pneumonia due to COVID-19 virus  Assessment & Plan  · Presented for fall at memory care unit and found to be COVID positive  · CTA Chest (7/9): Scattered groundglass infiltrates, most pronounced right lower lobe  No CT evidence of pulmonary emboli  · Further clarified goals of care with patient's daughter and wife at the bedside on 7/11  Family does not want to pursue treatment for COVID-19, and reiterate that they do not want any further treatment that is meant to prolong the patient's life  They would like for the medical team to focus on comfort only, and they understand that this is in line with patient's comfort measures only status  · Was evaluated by Hospice liaison and does not qualify for hospice house but acceptable for SNF with Hospice or home with Hospice- the family has elected to proceed with SNF at this time  · CM following to aide in placement at this time  · Family has requested repeat COVID swab (pending)  · Patient remains not hypoxic and on RA  Hypotension  Assessment & Plan  · Hypotensive on arrival; family declined invasive measures than chest central line in vasopressors  · CMO       Fall  Assessment & Plan  · Unwitnessed fall at memory care unit  · CT Brain (7/9): Small right frontal scalp hematomas but no calvarial fracture or acute intracranial process is seen   · CT C-spine (7/9): Degenerative changes with no evidence of acute cervical spine injury   · Pain control with Dilaudid 0 2 mg q 4 hours per comfort order set  · Fall precautions and continuous observation    Slow transit constipation  Assessment & Plan  · CT abd/pelvis (7/9): Moderate constipation and fecal impaction    Mild abnormal appearance of the rectum, suspicious for stercoral proctitis  Follow-up GI and/or surgical consultation recommended  · Dulcolax suppository per comfort orders  · Family requests Fleet enema as needed for constipation       Dementia with behavioral disturbance ([facility] )  Assessment & Plan  · Previously resides at [facility] to in memory care unit  · Ativan 0 5 mg q 4 hours as needed for agitation per comfort order set  · Continuous observation as above      VTE Pharmacologic Prophylaxis:   CMO- no DVT prophylaxis indicated    Patient Centered Rounds: I performed bedside rounds with nursing staff today  Discussions with Specialists or Other Care Team Provider: case management, nursing    Education and Discussions with Family / Patient: Updated  (wife and daughter) at bedside  Time Spent for Care: 30 minutes  More than 50% of total time spent on counseling and coordination of care as described above  Current Length of Stay: 2 day(s)  Current Patient Status: Inpatient   Certification Statement: The patient will continue to require additional inpatient hospital stay due to CM for SNF placement with hospice  Discharge Plan: pending SNF acceptance    Code Status: Level 4 - Comfort Care    Subjective:   Patient seen and examined at the bedside, appears slightly agitated, and unable to make purposeful or clear verbal communication  Patient's wife and daughter were present at the time of the encounter  Goals of care discussion was had with the patient's family, and comfort measures were clarified with verbalization of clear understanding  Objective:     Vitals:   Temp (24hrs), Av 1 °F (36 7 °C), Min:97 8 °F (36 6 °C), Max:98 3 °F (36 8 °C)    Temp:  [97 8 °F (36 6 °C)-98 3 °F (36 8 °C)] 98 3 °F (36 8 °C)  HR:  [] 83  Resp:  [20] 20  BP: (111-124)/(73-78) 111/78  SpO2:  [97 %-98 %] 98 %  Body mass index is 16 66 kg/m²  Input and Output Summary (last 24 hours):      Intake/Output Summary (Last 24 hours) at 2022 Tod Dumont 4 filed at 7/11/2022 1200  Gross per 24 hour   Intake 360 ml   Output --   Net 360 ml       Physical Exam:   Physical Exam  Constitutional:       Appearance: He is underweight  He is ill-appearing  HENT:      Head: Normocephalic and atraumatic  Cardiovascular:      Rate and Rhythm: Normal rate and regular rhythm  Pulses: Normal pulses  Heart sounds: Normal heart sounds  Pulmonary:      Effort: No respiratory distress  Abdominal:      General: Abdomen is flat  There is no distension  Palpations: Abdomen is soft  Musculoskeletal:      Right lower leg: No edema  Left lower leg: No edema  Skin:     General: Skin is warm and dry  Neurological:      Mental Status: He is disoriented  Motor: Weakness (generalized) present  Psychiatric:         Behavior: Behavior is agitated            Additional Data:     Labs:  Results from last 7 days   Lab Units 07/09/22  0210   WBC Thousand/uL 3 80*   HEMOGLOBIN g/dL 13 1   HEMATOCRIT % 39 1   PLATELETS Thousands/uL 158   NEUTROS PCT % 52   LYMPHS PCT % 32   MONOS PCT % 15*   EOS PCT % 1     Results from last 7 days   Lab Units 07/09/22  0210   SODIUM mmol/L 137   POTASSIUM mmol/L 3 4*   CHLORIDE mmol/L 101   CO2 mmol/L 29   BUN mg/dL 28*   CREATININE mg/dL 0 84   ANION GAP mmol/L 7   CALCIUM mg/dL 9 0   ALBUMIN g/dL 3 7   TOTAL BILIRUBIN mg/dL 0 58   ALK PHOS U/L 53   ALT U/L 54*   AST U/L 155*   GLUCOSE RANDOM mg/dL 85     Results from last 7 days   Lab Units 07/09/22  0210   INR  0 95             Results from last 7 days   Lab Units 07/09/22  0210   LACTIC ACID mmol/L 0 9   PROCALCITONIN ng/ml 0 07       Lines/Drains:  Invasive Devices  Report    Peripheral Intravenous Line  Duration           Peripheral IV 07/09/22 Right Forearm 2 days                      Imaging: Reviewed radiology reports from this admission including: CXR, CTH, CT cervical spine, CTA c/a/p    Recent Cultures (last 7 days):   Results from last 7 days   Lab Units 07/09/22  0212 07/09/22  0210   BLOOD CULTURE  Staphylococcus coagulase negative* No Growth at 24 hrs  GRAM STAIN RESULT  Gram positive cocci in clusters*  --        Last 24 Hours Medication List:   Current Facility-Administered Medications   Medication Dose Route Frequency Provider Last Rate    bisacodyl  10 mg Rectal Daily PRN CATHRYN Jennings      HYDROmorphone  0 2 mg Intravenous Q4H PRN CATHRYN Jennings      LORazepam  1 mg Intravenous Q4H PRN [facility],       sodium phosphate-biphosphate  1 enema Rectal Daily PRN [address] [facility], DO          Today, Patient Was Seen By: Hillary Villanueva PA-C    **Please Note: This note may have been constructed using a voice recognition system  **

## 2022-07-11 NOTE — ASSESSMENT & PLAN NOTE
· Previously resides at pulmonary to in memory care unit  · Ativan 0 5 mg q 4 hours as needed for agitation per comfort order set  · Continuous observation as above

## 2022-07-11 NOTE — ASSESSMENT & PLAN NOTE
· Presented for fall at memory care unit and found to be COVID positive  · CTA Chest (7/9): Scattered groundglass infiltrates, most pronounced right lower lobe  No CT evidence of pulmonary emboli  · Further clarified goals of care with patient's daughter and wife at the bedside on 7/11  Family does not want to pursue treatment for COVID-19, and reiterate that they do not want any further treatment that is meant to prolong the patient's life  They would like for the medical team to focus on comfort only, and they understand that this is in line with patient's comfort measures only status  · Was evaluated by Hospice liaison and does not qualify for hospice house but acceptable for SNF with Hospice or home with Hospice- the family has elected to proceed with SNF at this time  · CM following to aide in placement at this time  · Family has requested repeat COVID swab (pending)  · Patient remains not hypoxic and on RA

## 2022-07-11 NOTE — ASSESSMENT & PLAN NOTE
· CT abd/pelvis (7/9): Moderate constipation and fecal impaction  Mild abnormal appearance of the rectum, suspicious for stercoral proctitis  Follow-up GI and/or surgical consultation recommended     · Dulcolax suppository per comfort orders  · Family requests Fleet enema as needed for constipation

## 2022-07-11 NOTE — ASSESSMENT & PLAN NOTE
· Unwitnessed fall at [facility] care unit  · CT Brain (7/9): Small right frontal scalp hematomas but no calvarial fracture or acute intracranial process is seen   · CT C-spine (7/9): Degenerative changes with no evidence of acute cervical spine injury   · Pain control with Dilaudid 0 2 mg q 4 hours per comfort order set  · Fall precautions and continuous observation

## 2022-07-11 NOTE — ASSESSMENT & PLAN NOTE
· Hypotensive on arrival; family declined invasive measures than chest central line in vasopressors  · CMO

## 2022-07-11 NOTE — CASE MANAGEMENT
Case Management Discharge Planning Note    Patient name Ernst Dress  Location [facility] 204/-[id number] MRN [id number]  : 1955 Date 2022       Current Admission Date: 2022  Current Admission Diagnosis:Pneumonia due to COVID-19 virus   Patient Active Problem List    Diagnosis Date Noted    Pneumonia due to COVID-19 virus 2022    Fall 2022    Hypotension 2022    Goals of care, counseling/discussion 2022    Medicare annual wellness visit, subsequent 2022    Decreased appetite 2022    Need for immunization against influenza 10/15/2021    Slow transit constipation 2021    Driving safety issue 2021    [facility]    Malignant melanoma of skin ([facility] ) 2016    Cough 2016    Anxiety 2014    Backache 2014    Conductive hearing loss 2014    Mixed hyperlipidemia 10/08/2013    Obstructive sleep apnea syndrome 10/08/2013    Symptoms involving urinary system 2013      LOS (days): 2  Geometric Mean LOS (GMLOS) (days): 5 40  Days to GMLOS:3 1     OBJECTIVE:  Risk of Unplanned Readmission Score: 12 33     Current admission status: Inpatient   Preferred Pharmacy:   [facility], [address]  [address] [place] [address]  Phone: [phone] Fax: [phone]    [place], [address]  [address] [address]  Phone: [phone] Fax: [facility], [address]  [address] [address]  Phone: [phone] Fax: [phone]    [address]  [address]  [facility] [address]  Phone: [phone] Fax: [phone]    Primary Care Provider: Sharad Diez MD    Primary Insurance: MEDICARE  Secondary Insurance: COMMERCIAL MISCELLANEOUS    DISCHARGE DETAILS:  Spoke to the wife and the daughter at the bedside  Family is undecided about going to a SNF with hospice  Family feels they may be able to take the pt home on hospice services as long as he is not agitated  Pt appears comfortable with the comfort measures in place  Daughter states she may be albe to get a FMLA to assist her mother with care  They will discuss same

## 2022-07-12 NOTE — PLAN OF CARE
Problem: Potential for Falls  Goal: Patient will remain free of falls  Description: INTERVENTIONS:  - Educate patient/family on patient safety including physical limitations  - Instruct patient to call for assistance with activity   - Consult OT/PT to assist with strengthening/mobility   - Keep Call bell within reach  - Keep bed low and locked with side rails adjusted as appropriate  - Keep care items and personal belongings within reach  - Initiate and maintain comfort rounds  - Make Fall Risk Sign visible to staff  - Offer Toileting every 2 Hours, in advance of need  - Initiate/Maintain bed alarm  - Obtain necessary fall risk management equipment: walker  - Apply yellow socks and bracelet for high fall risk patients  - Consider moving patient to room near nurses station  Outcome: Not Progressing     Problem: MOBILITY - ADULT  Goal: Maintain or return to baseline ADL function  Description: INTERVENTIONS:  -  Assess patient's ability to carry out ADLs; assess patient's baseline for ADL function and identify physical deficits which impact ability to perform ADLs (bathing, care of mouth/teeth, toileting, grooming, dressing, etc )  - Assess/evaluate cause of self-care deficits   - Assess range of motion  - Assess patient's mobility; develop plan if impaired  - Assess patient's need for assistive devices and provide as appropriate  - Encourage maximum independence but intervene and supervise when necessary  - Involve family in performance of ADLs  - Assess for home care needs following discharge   - Consider OT consult to assist with ADL evaluation and planning for discharge  - Provide patient education as appropriate  Outcome: Not Progressing  Goal: Maintains/Returns to pre admission functional level  Description: INTERVENTIONS:  - Perform BMAT or MOVE assessment daily    - Set and communicate daily mobility goal to care team and patient/family/caregiver     - Collaborate with rehabilitation services on mobility goals if consulted  - Perform Range of Motion 3 times a day  - Reposition patient every 2 hours    - Dangle patient 3 times a day  - Stand patient 3 times a day  - Ambulate patient 3 times a day  - Out of bed to chair 3 times a day   - Out of bed for meals 3 times a day  - Out of bed for toileting  - Record patient progress and toleration of activity level   Outcome: Not Progressing     Problem: Prexisting or High Potential for Compromised Skin Integrity  Goal: Skin integrity is maintained or improved  Description: INTERVENTIONS:  - Identify patients at risk for skin breakdown  - Assess and monitor skin integrity  - Assess and monitor nutrition and hydration status  - Monitor labs   - Assess for incontinence   - Turn and reposition patient  - Assist with mobility/ambulation  - Relieve pressure over bony prominences  - Avoid friction and shearing  - Provide appropriate hygiene as needed including keeping skin clean and dry  - Evaluate need for skin moisturizer/barrier cream  - Collaborate with interdisciplinary team   - Patient/family teaching  - Consider wound care consult   Outcome: Not Progressing

## 2022-07-12 NOTE — ASSESSMENT & PLAN NOTE
· Unwitnessed fall at [facility] care unit  · CT Brain (7/9): Small right frontal scalp hematomas but no calvarial fracture or acute intracranial process is seen   · CT C-spine (7/9): Degenerative changes with no evidence of acute cervical spine injury   · Pain control with Dilaudid 0 2 mg q 4 hours per comfort order set  · Fall precautions and continuous observation

## 2022-07-12 NOTE — ASSESSMENT & PLAN NOTE
· Presented for fall at memory care unit and found to be COVID positive  · CTA Chest (7/9): Scattered groundglass infiltrates, most pronounced right lower lobe  No CT evidence of pulmonary emboli  · Family has requested repeat COVID swab, which is positive  · Patient remains not hypoxic and on RA  · Further clarified goals of care with patient's daughter and wife at the bedside on 7/11  Family does not want to pursue treatment for COVID-19, and reiterate that they do not want any further treatment that is meant to prolong the patient's life  They would like for the medical team to focus on comfort only, and they understand that this is in line with patient's comfort measures only status  · Patient evaluated by hospice liaison and approved for hospice  Patient's family initially requested SNF, but now considering home with home hospice; however, would like to think about it  CM to aide in disposition planning

## 2022-07-12 NOTE — CASE MANAGEMENT
Case Management Discharge Planning Note    Patient name Daphney Canchola  Location [address] 204/-85 MRN [id number]  : 1955 Date 2022       Current Admission Date: 2022  Current Admission Diagnosis:Pneumonia due to COVID-19 virus   Patient Active Problem List    Diagnosis Date Noted    Pneumonia due to COVID-19 virus 2022    Fall 2022    Hypotension 2022    Goals of care, counseling/discussion 2022    Medicare annual wellness visit, subsequent 2022    Decreased appetite 2022    Need for immunization against influenza 10/15/2021    Slow transit constipation 2021    Driving safety issue 2021    [facility]    Malignant melanoma of skin ([facility] ) 2016    Cough 2016    Anxiety 2014    Backache 2014    Conductive hearing loss 2014    Mixed hyperlipidemia 10/08/2013    Obstructive sleep apnea syndrome 10/08/2013    Symptoms involving urinary system 2013      LOS (days): 3  Geometric Mean LOS (GMLOS) (days): 5 40  Days to GMLOS:2 1     OBJECTIVE:  Risk of Unplanned Readmission Score: 11 04     Current admission status: Inpatient   Preferred Pharmacy:   [address] [facility]  [address] [place] [address]  Phone: [phone] Fax: [phone]    [place], [address]  [address] [address]  Phone: [phone] Fax: [facility], [address]   [address] [address]  Phone: [phone] Fax: [phone]    [address]  [address]  [facility] [address]  Phone: [phone] Fax: [phone]    Primary Care Provider: Irasema Ma MD    Primary Insurance: MEDICARE  Secondary Insurance: COMMERCIAL MISCELLANEOUS    DISCHARGE DETAILS:  Spoke to pt wife who states she will most likely be taking the pt home with [facility]  Needs to speak to one more family member  Requested the names of agencies that provide CG in the home  Provided a list, wife understands they are not covered by insurance  Will need DME delivered once wife is ready to take pt home

## 2022-07-12 NOTE — PROGRESS NOTES
[address]  Progress Note Hettie Leon 1955, 69 y o  male MRN: [id number]  Unit/Bed#: -01 Encounter: [id number]  Primary Care Provider: Jacquelyne Epley, MD   Date and time admitted to hospital: 7/9/2022  1:50 AM    * Pneumonia due to COVID-19 virus  Assessment & Plan  · Presented for fall at memory care unit and found to be COVID positive  · CTA Chest (7/9): Scattered groundglass infiltrates, most pronounced right lower lobe  No CT evidence of pulmonary emboli  · Family has requested repeat COVID swab, which is positive  · Patient remains not hypoxic and on RA  · Further clarified goals of care with patient's daughter and wife at the bedside on 7/11  Family does not want to pursue treatment for COVID-19, and reiterate that they do not want any further treatment that is meant to prolong the patient's life  They would like for the medical team to focus on comfort only, and they understand that this is in line with patient's comfort measures only status  · Patient evaluated by hospice liaison and approved for hospice  Patient's family initially requested SNF, but now considering home with home hospice; however, would like to think about it  CM to aide in disposition planning  Hypotension  Assessment & Plan  · Hypotensive on arrival; family declined invasive measures than chest central line in vasopressors  · CMO       Fall  Assessment & Plan  · Unwitnessed fall at [facility] care unit  · CT Brain (7/9): Small right frontal scalp hematomas but no calvarial fracture or acute intracranial process is seen   · CT C-spine (7/9): Degenerative changes with no evidence of acute cervical spine injury   · Pain control with Dilaudid 0 2 mg q 4 hours per comfort order set  · Fall precautions and continuous observation    Slow transit constipation  Assessment & Plan  · CT abd/pelvis (7/9): Moderate constipation and fecal impaction    Mild abnormal appearance of the rectum, suspicious for stercoral proctitis  Follow-up GI and/or surgical consultation recommended  · Dulcolax suppository per comfort orders  · Family requests Fleet enema as needed for constipation       Dementia with behavioral disturbance ([facility] )  Assessment & Plan  · Previously resides at [facility] to in memory care unit  · Ativan 0 5 mg q 4 hours as needed for agitation per comfort order set  · Continuous observation as above          VTE Pharmacologic Prophylaxis:   None  Patient is CMO  Patient Centered Rounds: I performed bedside rounds with nursing staff today  Discussions with Specialists or Other Care Team Provider: case management, nursing    Education and Discussions with Family / Patient: Updated  (wife) at bedside  Time Spent for Care: 30 minutes  More than 50% of total time spent on counseling and coordination of care as described above  Current Length of Stay: 3 day(s)  Current Patient Status: Inpatient   Certification Statement: The patient will continue to require additional inpatient hospital stay due to SNF placement with hospice vs home with home hospice  Discharge Plan: pending decision regarding discharge plan    Code Status: Level 4 - Comfort Care    Subjective:   Patient seen and examined at the bedside  Wife present at the time of the encounter  Patient continues to remain lethargic and unable to make purposeful communication  Objective:     Vitals:   Temp (24hrs), Av 9 °F (37 2 °C), Min:98 9 °F (37 2 °C), Max:98 9 °F (37 2 °C)    Temp:  [98 9 °F (37 2 °C)] 98 9 °F (37 2 °C)  HR:  [] 84  Resp:  [14-20] 20  BP: (109)/(67) 109/67  SpO2:  [94 %-95 %] 95 %  Body mass index is 16 66 kg/m²  Input and Output Summary (last 24 hours): Intake/Output Summary (Last 24 hours) at 2022 1615  Last data filed at 2022 1200  Gross per 24 hour   Intake 120 ml   Output 365 ml   Net -245 ml       Physical Exam:   Physical Exam  Vitals and nursing note reviewed  Constitutional:       General: He is sleeping  Appearance: He is underweight  He is ill-appearing  HENT:      Head: Normocephalic and atraumatic  Cardiovascular:      Rate and Rhythm: Normal rate and regular rhythm  Pulmonary:      Effort: Pulmonary effort is normal  No respiratory distress  Abdominal:      General: Bowel sounds are normal  There is no distension  Palpations: Abdomen is soft  Musculoskeletal:      Right lower leg: No edema  Left lower leg: No edema  Skin:     General: Skin is warm and dry  Neurological:      Mental Status: He is lethargic  Additional Data:     Labs:  Results from last 7 days   Lab Units 07/09/22  0210   WBC Thousand/uL 3 80*   HEMOGLOBIN g/dL 13 1   HEMATOCRIT % 39 1   PLATELETS Thousands/uL 158   NEUTROS PCT % 52   LYMPHS PCT % 32   MONOS PCT % 15*   EOS PCT % 1     Results from last 7 days   Lab Units 07/09/22  0210   SODIUM mmol/L 137   POTASSIUM mmol/L 3 4*   CHLORIDE mmol/L 101   CO2 mmol/L 29   BUN mg/dL 28*   CREATININE mg/dL 0 84   ANION GAP mmol/L 7   CALCIUM mg/dL 9 0   ALBUMIN g/dL 3 7   TOTAL BILIRUBIN mg/dL 0 58   ALK PHOS U/L 53   ALT U/L 54*   AST U/L 155*   GLUCOSE RANDOM mg/dL 85     Results from last 7 days   Lab Units 07/09/22  0210   INR  0 95             Results from last 7 days   Lab Units 07/09/22  0210   LACTIC ACID mmol/L 0 9   PROCALCITONIN ng/ml 0 07       Lines/Drains:  Invasive Devices  Report    Peripheral Intravenous Line  Duration           Peripheral IV 07/09/22 Right Forearm 3 days          Drain  Duration           Urethral Catheter Double-lumen 16 Fr  <1 day              Urinary Catheter:  Goal for removal: Per comfort measures order set               Imaging: No pertinent imaging reviewed  Recent Cultures (last 7 days):   Results from last 7 days   Lab Units 07/09/22  0212 07/09/22  0210   BLOOD CULTURE  Staphylococcus coagulase negative* No Growth at 72 hrs     GRAM STAIN RESULT  Gram positive cocci in clusters*  --        Last 24 Hours Medication List:   Current Facility-Administered Medications   Medication Dose Route Frequency Provider Last Rate    bisacodyl  10 mg Rectal Daily PRN CATHRYN Tran      HYDROmorphone  0 2 mg Intravenous Q4H PRN CATHRYN Tran      LORazepam  1 mg Intravenous Q4H PRN Eulogio Gave Briones, DO      sodium phosphate-biphosphate  1 enema Rectal Daily PRN Myra Del Toro, DO          Today, Patient Was Seen By: Vicki Mayfield PA-C    **Please Note: This note may have been constructed using a voice recognition system  **

## 2022-07-13 NOTE — QUICK NOTE
Had an extensive conversation with patient's daughter with patient's nurse present at bedside during conversation  Patient's daughter voiced concern that her father's pain was not being adequately controlled  Explained to her that the Ativan and Dilaudid were ordered on a p r n  basis and that I would switch these to a scheduled q 4 hours but wanted her to understand this unfortunately may hasten his demise  She stated that she understood this and her main goal was focusing on his comfort  I reassured her that I will be taking care of him for the next 6 nights and the nurse that was with him tonight would relate to me any signs or symptoms of agitation or pain and if they presented we would adequately control them  I explained her that the daytime provider taking care of him would also be with him for the next 5 days and I would have a conversation with her in the morning to update her on the understanding of goals of care

## 2022-07-13 NOTE — ASSESSMENT & PLAN NOTE
· Previously resides at [place] [place] [facility] to in memory care unit  · Ativan 2 mg mg q 4 hours for agitation per comfort order set  · Continuous observation as above

## 2022-07-13 NOTE — PLAN OF CARE
Problem: Potential for Falls  Goal: Patient will remain free of falls  Description: INTERVENTIONS:  - Educate patient/family on patient safety including physical limitations  - Instruct patient to call for assistance with activity   - Consult OT/PT to assist with strengthening/mobility   - Keep Call bell within reach  - Keep bed low and locked with side rails adjusted as appropriate  - Keep care items and personal belongings within reach  - Initiate and maintain comfort rounds  - Make Fall Risk Sign visible to staff  - Offer Toileting   - Obtain necessary fall risk management equipment  - Apply yellow socks and bracelet for high fall risk patients  - Consider moving patient to room near nurses station  Outcome: Progressing     Problem: Prexisting or High Potential for Compromised Skin Integrity  Goal: Skin integrity is maintained or improved  Description: INTERVENTIONS:  - Identify patients at risk for skin breakdown  - Assess and monitor skin integrity  - Assess and monitor nutrition and hydration status  - Monitor labs   - Assess for incontinence   - Turn and reposition patient  - Assist with mobility/ambulation  - Relieve pressure over bony prominences  - Avoid friction and shearing  - Provide appropriate hygiene as needed including keeping skin clean and dry  - Evaluate need for skin moisturizer/barrier cream  - Collaborate with interdisciplinary team   - Patient/family teaching  - Consider wound care consult   Outcome: Progressing

## 2022-07-13 NOTE — NURSING NOTE
Daughter stated she did not want her father fed when he is lethargic/tired  I told her I would not feed him in that kind of state

## 2022-07-13 NOTE — CASE MANAGEMENT
Case Management Discharge Planning Note    Patient name Margie Pate  Location [address] 204/-26 MRN [id number]  : 1955 Date 2022       Current Admission Date: 2022  Current Admission Diagnosis:Pneumonia due to COVID-19 virus   Patient Active Problem List    Diagnosis Date Noted    Pneumonia due to COVID-19 virus 2022    Fall 2022    Hypotension 2022    Goals of care, counseling/discussion 2022    Medicare annual wellness visit, subsequent 2022    Decreased appetite 2022    Need for immunization against influenza 10/15/2021    Slow transit constipation 2021    Driving safety issue 2021    [facility] 2020    Malignant [facility] ([facility] ) 2016    Cough 2016    Anxiety 2014    Backache 2014    Conductive hearing loss 2014    Mixed hyperlipidemia 10/08/2013    Obstructive sleep apnea syndrome 10/08/2013    Symptoms involving urinary system 2013      LOS (days): 4  Geometric Mean LOS (GMLOS) (days): 5 40  Days to GMLOS:1 1     OBJECTIVE:  Risk of Unplanned Readmission Score: 11 21     Current admission status: Inpatient   Preferred Pharmacy:   [address], [address] [facility]  [address] [place]   Phone: [phone] Fax: [phone]    [place], [address] [facility] [address] [facility] [address]  [address] [address]  Phone: [phone] Fax: [facility], [address]   [address] [address]  Phone: [phone] Fax: [phone]    [address], [address]  [address]  [facility] [address]  Phone: [phone] Fax: [phone]    Primary Care Provider: Nely Stokes MD    Primary Insurance: MEDICARE  Secondary Insurance: COMMERCIAL MISCELLANEOUS    DISCHARGE DETAILS:  Spoke to pt daughter who reports the pt spouse is trying to obtain a CG for at [facility] to take the pt home with [facility]  Mariluz from Hospice here to speak with the family  Provided the family with the information with [facility] to obtain a CG

## 2022-07-13 NOTE — NURSING NOTE
Spoke with SANJU Cortez regarding family (Wife and Daughters) concerns with patients medications, request to increase Ativan and have additional PRN pain medication  Orders placed, Ativan increased to 2 mg  Every 4 hours, Dilaudid 0 5mg every four hours and Morphine PRN ordered  Explained new orders to wife and daughter  Patient remains somnolent, Ativan 2mg  IV given to patient

## 2022-07-13 NOTE — ASSESSMENT & PLAN NOTE
· Previous provider had initial goals of care discussion with patient's family per prior documented notes  · I, myself, further clarified goals of care with patient's daughter and wife at the bedside on 7/11  Family does not want to pursue treatment for COVID-19, and reiterate that they do not want any further treatment that is meant to prolong the patient's life  They would like for the medical team to focus on comfort only, and they understand that this is in line with patient's comfort measures only status  · Again, patient has been made CMO  Level of care has been changed to Level 4    · Patient evaluated by hospice liaison and approved for hospice  Patient's family initially requested SNF, but have decided they would prefer to take the patient home with hospices services  CM to aide in disposition planning

## 2022-07-13 NOTE — ASSESSMENT & PLAN NOTE
· Unwitnessed fall at [facility] care unit  · CT Brain (7/9): Small right frontal scalp hematomas but no calvarial fracture or acute intracranial process is seen   · CT C-spine (7/9): Degenerative changes with no evidence of acute cervical spine injury   · Pain control with Dilaudid 0 5 mg q 4 hours and morphine 2 mg q 4 hrs prn breakthrough pain, per comfort order set  · Fall precautions and continuous observation

## 2022-07-13 NOTE — ASSESSMENT & PLAN NOTE
· Previously resides at [facility] to in memory care unit  · Ativan 2 mg mg q 4 hours for agitation per comfort order set  · Continuous observation as above

## 2022-07-13 NOTE — ASSESSMENT & PLAN NOTE
· Hypotensive on arrival; family declined invasive measures such as central line and vasopressors  · CMO

## 2022-07-13 NOTE — NURSING NOTE
Met with daughter and wife, they expressed concerns about the pain medication and the fact that he does not have any PRN medications for breakthrough and she said that during her conversation with provider  In the evening she thought that was the plan to have a PRN medication in addition to changing the medication to scheduled  I instructed them that I would talk with provider on duty currently to discuss the concerns of not having a PRN medication and the request to increase the ativan  They were agreeable to plan

## 2022-07-13 NOTE — PROGRESS NOTES
[address] 43  Progress Note Claudio Delcid 1955, 69 y o  male MRN: [id number]  Unit/Bed#: -01 Encounter: [id number]  Primary Care Provider: Libia George MD   Date and time admitted to hospital: 7/9/2022  1:50 AM    Goals of care, counseling/discussion  Assessment & Plan  · Previous provider had initial goals of care discussion with patient's family per prior documented notes  · I, myself, further clarified goals of care with patient's daughter and wife at the bedside on 7/11  Family does not want to pursue treatment for COVID-19, and reiterate that they do not want any further treatment that is meant to prolong the patient's life  They would like for the medical team to focus on comfort only, and they understand that this is in line with patient's comfort measures only status  · Again, patient has been made CMO  Level of care has been changed to Level 4    · Patient evaluated by hospice liaison and approved for hospice  Patient's family initially requested SNF, but have decided they would prefer to take the patient home with hospices services  CM to aide in disposition planning  * Pneumonia due to COVID-19 virus  Assessment & Plan  · Presented for fall at memory care unit and found to be COVID positive  · CTA Chest (7/9): Scattered groundglass infiltrates, most pronounced right lower lobe  No CT evidence of pulmonary emboli     · Family has requested repeat COVID swab, which is positive  · Initially not hypoxic on RA; however, episodes of hypoxia while patient is sleeping     Hypotension  Assessment & Plan  · Hypotensive on arrival; family declined invasive measures such as central line and vasopressors  · CMO       Fall  Assessment & Plan  · Unwitnessed fall at memory care unit  · CT Brain (7/9): Small right frontal scalp hematomas but no calvarial fracture or acute intracranial process is seen   · CT C-spine (7/9): Degenerative changes with no evidence of acute cervical spine injury   · Pain control with Dilaudid 0 5 mg q 4 hours and morphine 2 mg q 4 hrs prn breakthrough pain, per comfort order set  · Fall precautions and continuous observation    Slow transit constipation  Assessment & Plan  · CT abd/pelvis (): Moderate constipation and fecal impaction  Mild abnormal appearance of the rectum, suspicious for stercoral proctitis  Follow-up GI and/or surgical consultation recommended  · Dulcolax suppository per comfort orders  · Family requests Fleet enema as needed for constipation       Dementia with behavioral disturbance ([facility] [address] )  Assessment & Plan  · Previously resides at [facility] to in memory care unit  · Ativan 2 mg mg q 4 hours for agitation per comfort order set  · Continuous observation as above        VTE Pharmacologic Prophylaxis:   Patient is CMO  DVT prophylaxis not indicated  Patient Centered Rounds: I performed bedside rounds with nursing staff today  Discussions with Specialists or Other Care Team Provider: case management, nursing    Education and Discussions with Family / Patient: Updated  (daughter) at bedside  Time Spent for Care: 30 minutes  More than 50% of total time spent on counseling and coordination of care as described above  Current Length of Stay: 4 day(s)  Current Patient Status: Inpatient   Certification Statement: The patient will continue to require additional inpatient hospital stay due to pending coordination of home hospice services  Discharge Plan: pending confirmation of home hospice services    Code Status: Level 4 - Comfort Care    Subjective:   Patient seen and examined at the bedside, sleeping  Patient's daughter present at the time of the encounter  Patient's daughter is asking if a scopalamine patch would be helpful for the patient's wet cough       Objective:     Vitals:   Temp (24hrs), Av 3 °F (36 8 °C), Min:98 3 °F (36 8 °C), Max:98 3 °F (36 8 °C)    Temp:  [98 3 °F (36 8 °C)] 98 3 °F (36 8 °C)  HR:  [94] 94  Resp:  [20] 20  BP: (124)/(66) 124/66  SpO2:  [89 %] 89 %  Body mass index is 16 66 kg/m²  Input and Output Summary (last 24 hours):   No intake or output data in the 24 hours ending 07/13/22 1715    Physical Exam:   Physical Exam  Constitutional:       General: He is sleeping  Appearance: He is cachectic  He is ill-appearing  HENT:      Head: Normocephalic and atraumatic  Cardiovascular:      Rate and Rhythm: Tachycardia present  Pulses: Normal pulses  Pulmonary:      Effort: Pulmonary effort is normal  No respiratory distress  Abdominal:      General: There is no distension  Palpations: Abdomen is soft  Musculoskeletal:      Right lower leg: No edema  Left lower leg: No edema  Skin:     General: Skin is warm and dry  Neurological:      Mental Status: He is lethargic  Additional Data:     Labs:  Results from last 7 days   Lab Units 07/09/22  0210   WBC Thousand/uL 3 80*   HEMOGLOBIN g/dL 13 1   HEMATOCRIT % 39 1   PLATELETS Thousands/uL 158   NEUTROS PCT % 52   LYMPHS PCT % 32   MONOS PCT % 15*   EOS PCT % 1     Results from last 7 days   Lab Units 07/09/22  0210   SODIUM mmol/L 137   POTASSIUM mmol/L 3 4*   CHLORIDE mmol/L 101   CO2 mmol/L 29   BUN mg/dL 28*   CREATININE mg/dL 0 84   ANION GAP mmol/L 7   CALCIUM mg/dL 9 0   ALBUMIN g/dL 3 7   TOTAL BILIRUBIN mg/dL 0 58   ALK PHOS U/L 53   ALT U/L 54*   AST U/L 155*   GLUCOSE RANDOM mg/dL 85     Results from last 7 days   Lab Units 07/09/22  0210   INR  0 95             Results from last 7 days   Lab Units 07/09/22  0210   LACTIC ACID mmol/L 0 9   PROCALCITONIN ng/ml 0 07       Lines/Drains:  Invasive Devices  Report    Peripheral Intravenous Line  Duration           Peripheral IV 07/13/22 Dorsal (posterior); Left Hand <1 day          Drain  Duration           Urethral Catheter Double-lumen 16 Fr  1 day              Urinary Catheter:  Goal for removal: Barnes per comfort order set Imaging: No pertinent imaging reviewed  Recent Cultures (last 7 days):   Results from last 7 days   Lab Units 07/09/22  0212 07/09/22  0210   BLOOD CULTURE  Staphylococcus coagulase negative* No Growth After 4 Days  GRAM STAIN RESULT  Gram positive cocci in clusters*  --        Last 24 Hours Medication List:   Current Facility-Administered Medications   Medication Dose Route Frequency Provider Last Rate    bisacodyl  10 mg Rectal Daily PRN CATHRYN Myers      HYDROmorphone  0 5 mg Intravenous Q4H Geoff Greenberg, PA-SUMMER      LORazepam  2 mg Intravenous Q4H Geoff Greenberg, PA-SUMMER      morphine injection  2 mg Intravenous Q4H PRN Geoff Greenberg PA-C      scopolamine  1 patch Transdermal Q72H SANJU Franco-SUMMER      sodium phosphate-biphosphate  1 enema Rectal Daily PRN [facility] Briones, DO          Today, Patient Was Seen By: Geoff Greenberg PA-C    **Please Note: This note may have been constructed using a voice recognition system  **

## 2022-07-13 NOTE — ASSESSMENT & PLAN NOTE
· Presented for fall at memory care unit and found to be COVID positive  · CTA Chest (7/9): Scattered groundglass infiltrates, most pronounced right lower lobe  No CT evidence of pulmonary emboli  · Family has requested repeat COVID swab, which is positive  · Patient remains not hypoxic and on RA  · Further clarified goals of care with patient's daughter and wife at the bedside on 7/11  Family does not want to pursue treatment for COVID-19, and reiterate that they do not want any further treatment that is meant to prolong the patient's life  They would like for the medical team to focus on comfort only, and they understand that this is in line with patient's comfort measures only status  · Patient evaluated by hospice liaison and approved for hospice  Patient's family initially requested SNF, but have decided they would prefer to take the patient home with hospices services  CM to aide in disposition planning

## 2022-07-13 NOTE — ASSESSMENT & PLAN NOTE
· Presented for fall at memory care unit and found to be COVID positive  · CTA Chest (7/9): Scattered groundglass infiltrates, most pronounced right lower lobe  No CT evidence of pulmonary emboli     · Family has requested repeat COVID swab, which is positive  · Initially not hypoxic on RA; however, episodes of hypoxia while patient is sleeping

## 2022-07-13 NOTE — ASSESSMENT & PLAN NOTE
· Unwitnessed fall at [facility] care unit  · CT Brain (7/9): Small right frontal scalp hematomas but no calvarial fracture or acute intracranial process is seen   · CT C-spine (7/9): Degenerative changes with no evidence of acute cervical spine injury   · Pain control with Dilaudid 0 5 mg q 4 hours and morphine 2 mg q 4 hrs prn breakthrough pain, per comfort order set  · Fall precautions and continuous observation

## 2022-07-14 NOTE — PLAN OF CARE
Problem: Potential for Falls  Goal: Patient will remain free of falls  Description: INTERVENTIONS:  - Educate patient/family on patient safety including physical limitations  - Instruct patient to call for assistance with activity   - Keep Call bell within reach  - Keep bed low and locked with side rails adjusted as appropriate  - Keep care items and personal belongings within reach  - Initiate and maintain comfort rounds  - Make Fall Risk Sign visible to staff  - Initiate/Maintain bed alarm  - Obtain necessary fall risk management equipment  Outcome: Progressing     Goal: Maintains/Returns to pre admission functional level  Description: INTERVENTIONS:  - Set and communicate daily mobility goal to care team and patient/family/caregiver  - Collaborate with rehabilitation services on mobility goals if consulted  - Reposition patient every 2 hours    Outcome: Progressing     Problem: Prexisting or High Potential for Compromised Skin Integrity  Goal: Skin integrity is maintained or improved  Description: INTERVENTIONS:  - Identify patients at risk for skin breakdown  - Assess and monitor skin integrity  - Turn and reposition patient  - Assist with mobility/ambulation  - Relieve pressure over bony prominences  - Avoid friction and shearing  - Provide appropriate hygiene as needed including keeping skin clean and dry  - Evaluate need for skin moisturizer/barrier cream  - Collaborate with interdisciplinary team   - Patient/family teaching  Outcome: Progressing

## 2022-07-14 NOTE — PROGRESS NOTES
[address] 43  Progress Note Claudio Delcid 1955, 69 y o  male MRN: [id number]  Unit/Bed#: -01 Encounter: [id number]  Primary Care Provider: Libia George MD   Date and time admitted to hospital: 7/9/2022  1:50 AM    Goals of care, counseling/discussion  Assessment & Plan  · Previous provider had initial goals of care discussion with patient's family per prior documented notes  · I, myself, further clarified goals of care with patient's daughter and wife at the bedside on 7/11  Family does not want to pursue treatment for COVID-19, and reiterate that they do not want any further treatment that is meant to prolong the patient's life  They would like for the medical team to focus on comfort only, and they understand that this is in line with patient's comfort measures only status  · Again, patient has been made CMO  Level of care has been changed to Level 4    · Patient evaluated by hospice liaison and approved for hospice  Patient's family initially requested SNF, but have decided they would prefer to take the patient home with hospices services  CM to aide in disposition planning  * Pneumonia due to COVID-19 virus  Assessment & Plan  · Presented for fall at memory care unit and found to be COVID positive  · CTA Chest (7/9): Scattered groundglass infiltrates, most pronounced right lower lobe  No CT evidence of pulmonary emboli     · Family has requested repeat COVID swab, which is positive  · Initially not hypoxic on RA; however, episodes of hypoxia while patient is sleeping     Hypotension  Assessment & Plan  · Hypotensive on arrival; family declined invasive measures such as central line and vasopressors  · CMO       Fall  Assessment & Plan  · Unwitnessed fall at memory care unit  · CT Brain (7/9): Small right frontal scalp hematomas but no calvarial fracture or acute intracranial process is seen   · CT C-spine (7/9): Degenerative changes with no evidence of acute cervical spine injury   · Pain control with Dilaudid 0 5 mg q 4 hours and morphine 2 mg q 4 hrs prn breakthrough pain, per comfort order set  · Fall precautions and continuous observation    Slow transit constipation  Assessment & Plan  · CT abd/pelvis (): Moderate constipation and fecal impaction  Mild abnormal appearance of the rectum, suspicious for stercoral proctitis  Follow-up GI and/or surgical consultation recommended  · Dulcolax suppository per comfort orders  · Family requests Fleet enema as needed for constipation       Dementia with behavioral disturbance ([facility] [address] )  Assessment & Plan  · Previously resides at [facility] to in memory care unit  · Ativan 2 mg mg q 4 hours for agitation per comfort order set  · Continuous observation as above          VTE Pharmacologic Prophylaxis:   No DVT prophylaxis indicated  Patient is CMO  Patient Centered Rounds: I performed bedside rounds with nursing staff today  Discussions with Specialists or Other Care Team Provider: case management, nursing    Education and Discussions with Family / Patient: Family at the bedside; however, no medical updates to provide at this time as patient is CMO        Time Spent for Care: 30 minutes  More than 50% of total time spent on counseling and coordination of care as described above  Current Length of Stay: 5 day(s)  Current Patient Status: Inpatient   Certification Statement: The patient will continue to require additional inpatient hospital stay due to home hospice planning  Discharge Plan: pending coordination of home hospices services    Code Status: Level 4 - Comfort Care    Subjective:   Patient seen and examined at the bedside  Vitals worsening with episodes of tachycardia and hypoxia  Patient is not alert, and unable to make purposeful verbal communication       Objective:     Vitals:   Temp (24hrs), Av 9 °F (36 6 °C), Min:97 9 °F (36 6 °C), Max:97 9 °F (36 6 °C)    Temp:  [97 9 °F (36 6 °C)] 97 9 °F (36 6 °C)  HR:  [109-120] 109  Resp:  [12-20] 12  BP: (91)/(63) 91/63  SpO2:  [88 %] 88 %  Body mass index is 16 66 kg/m²  Input and Output Summary (last 24 hours): Intake/Output Summary (Last 24 hours) at 7/14/2022 1327  Last data filed at 7/14/2022 0809  Gross per 24 hour   Intake 0 ml   Output 700 ml   Net -700 ml       Physical Exam:   Physical Exam  Constitutional:       General: He is sleeping  Appearance: He is cachectic  He is ill-appearing  HENT:      Head: Normocephalic and atraumatic  Cardiovascular:      Rate and Rhythm: Regular rhythm  Tachycardia present  Pulses: Normal pulses  Pulmonary:      Effort: Pulmonary effort is normal    Abdominal:      General: Abdomen is flat  There is no distension  Musculoskeletal:      Right lower leg: No edema  Left lower leg: No edema  Skin:     General: Skin is warm and dry  Neurological:      Mental Status: He is lethargic  Additional Data:     Labs:  Results from last 7 days   Lab Units 07/09/22  0210   WBC Thousand/uL 3 80*   HEMOGLOBIN g/dL 13 1   HEMATOCRIT % 39 1   PLATELETS Thousands/uL 158   NEUTROS PCT % 52   LYMPHS PCT % 32   MONOS PCT % 15*   EOS PCT % 1     Results from last 7 days   Lab Units 07/09/22  0210   SODIUM mmol/L 137   POTASSIUM mmol/L 3 4*   CHLORIDE mmol/L 101   CO2 mmol/L 29   BUN mg/dL 28*   CREATININE mg/dL 0 84   ANION GAP mmol/L 7   CALCIUM mg/dL 9 0   ALBUMIN g/dL 3 7   TOTAL BILIRUBIN mg/dL 0 58   ALK PHOS U/L 53   ALT U/L 54*   AST U/L 155*   GLUCOSE RANDOM mg/dL 85     Results from last 7 days   Lab Units 07/09/22  0210   INR  0 95             Results from last 7 days   Lab Units 07/09/22  0210   LACTIC ACID mmol/L 0 9   PROCALCITONIN ng/ml 0 07       Lines/Drains:  Invasive Devices  Report    Peripheral Intravenous Line  Duration           Peripheral IV 07/13/22 Dorsal (posterior); Left Hand 1 day          Drain  Duration           Urethral Catheter Double-lumen 16 Fr  2 days              Urinary Catheter:  Goal for removal: Per comfort order set               Imaging: No pertinent imaging reviewed  Recent Cultures (last 7 days):   Results from last 7 days   Lab Units 07/09/22  0212 07/09/22  0210   BLOOD CULTURE  Staphylococcus coagulase negative* No Growth After 4 Days  GRAM STAIN RESULT  Gram positive cocci in clusters*  --        Last 24 Hours Medication List:   Current Facility-Administered Medications   Medication Dose Route Frequency Provider Last Rate    bisacodyl  10 mg Rectal Daily PRN CATHRYN Guo      LORazepam  2 mg Intravenous Q1H PRN Deepthi Hazel MD      morphine injection  2 mg Intravenous Q1H PRN Deepthi Hazel MD      scopolamine  1 patch Transdermal Q72H Martir Avila PA-C      sodium phosphate-biphosphate  1 enema Rectal Daily PRN [id number] [facility], DO          Today, Patient Was Seen By: Martir Avila PA-C    **Please Note: This note may have been constructed using a voice recognition system  **

## 2022-07-15 NOTE — ASSESSMENT & PLAN NOTE
· Previous provider had initial goals of care discussion with patient's family per prior documented notes  · I, myself, further clarified goals of care with patient's daughter and wife at the bedside on   Family does not want to pursue treatment for COVID-19, and reiterate that they do not want any further treatment that is meant to prolong the patient's life  They would like for the medical team to focus on comfort only, and they understand that this is in line with patient's comfort measures only status  · Again, patient has been made CMO  Level of care has been changed to Level 4    · Patient was approved for home hospice; however, patient  while home hospice services were being coordinated  · Patient  due to respiratory failure on 07/15/2022 at 1:10 a m

## 2022-07-15 NOTE — ASSESSMENT & PLAN NOTE
· Presented for fall at memory care unit and found to be COVID positive  · CTA Chest (): Scattered groundglass infiltrates, most pronounced right lower lobe  No CT evidence of pulmonary emboli     · Patient  on 07/15/2022 at [facility]

## 2022-07-15 NOTE — DISCHARGE SUMMARY
Isabela 43  Discharge- London Grijalva 1955, 69 y o  male MRN: [id number]  Unit/Bed#: - Encounter: [id number]  Primary Care Provider: Vikram Arora MD   Date and time admitted to hospital: 2022  1:50 AM    Goals of care, counseling/discussion  Assessment & Plan  · Previous provider had initial goals of care discussion with patient's family per prior documented notes  · I, myself, further clarified goals of care with patient's daughter and wife at the bedside on   Family does not want to pursue treatment for COVID-19, and reiterate that they do not want any further treatment that is meant to prolong the patient's life  They would like for the medical team to focus on comfort only, and they understand that this is in line with patient's comfort measures only status  · Again, patient has been made CMO  Level of care has been changed to Level 4    · Patient was approved for home hospice; however, patient  while home hospice services were being coordinated  · Patient  due to respiratory failure on 07/15/2022 at 1:10 a m  * Pneumonia due to COVID-19 virus  Assessment & Plan  · Presented for fall at memory care unit and found to be COVID positive  · CTA Chest (): Scattered groundglass infiltrates, most pronounced right lower lobe  No CT evidence of pulmonary emboli     · Patient  on 07/15/2022 at 0110     Hypotension  Assessment & Plan  · Hypotensive on arrival; family declined invasive measures such as central line and vasopressors  · CMO   · Patient  on 07/15/2022 at [address]  · Unwitnessed fall at memory care unit  · CT Brain (): Small right frontal scalp hematomas but no calvarial fracture or acute intracranial process is seen   · CT C-spine (): Degenerative changes with no evidence of acute cervical spine injury   · Status post Pain control with Dilaudid 0 5 mg q 4 hours and morphine 2 mg q 4 hrs prn breakthrough pain, per comfort order set  · Patient  on 07/15/2022 at 0110     Slow transit constipation  Assessment & Plan  · CT abd/pelvis (): Moderate constipation and fecal impaction  Mild abnormal appearance of the rectum, suspicious for stercoral proctitis  Follow-up GI and/or surgical consultation recommended  · S/p Dulcolax suppository per comfort orders  · S/p Fleet enema as needed for constipation  · Patient  on 07/15/2022 at [address]       Dementia with behavioral disturbance [facility])  Assessment & Plan  · Previously resided at [address] [place] [address]  · Status post Ativan 2 mg mg q 4 hours for agitation per comfort order set  · Patient  on 07/15/2022 at [address]             Resolved Problems  Date Reviewed: 7/15/2022   None               Discharging Physician / Practitioner: Nellie Wallace PA-C  PCP: Daniel Kumar MD  Admission Date:   Admission Orders (From admission, onward)     Ordered        22 0740  INPATIENT ADMISSION  Once            22 0712  Place in Observation  Once,   Status:  Canceled                      Discharge Date: 07/15/22    Consultations During Hospital Stay:  · Hospice    Procedures Performed:   · None    Significant Findings / Test Results:   · COVID-19 positive  · CT head:  Degenerative changes, no evidence of acute cervical spine injury  · CT cervical spine:  Degenerative changes without cervical spine injury  · CTA chest/abdomen/pelvis:  1  Suboptimal examination due to beam hardening artifact from imaging the patient with his arms at his side and overlying the lower pelvis  2   Scattered groundglass infiltrates, most pronounced right lower lobe  Correlate for multifocal/multi lobar pneumonia  In the setting of clinically suspected/proven COVID-19, the above lung parenchymal findings on CT indicate intermediate confidence   level for COVID-19   3   Moderate constipation and fecal impaction    Mild abnormal appearance of the rectum, suspicious for stercoral proctitis  Follow-up GI and/or surgical consultation recommended  4   No CT evidence of pulmonary emboli  Incidental Findings:   · None     Test Results Pending at Discharge (will require follow up): · Not applicable     Outpatient Tests Requested:  · Not applicable    Complications:  Patient  at 1:10 a m  on 07/15/2022 due to respiratory arrest    Reason for Admission:  Pneumonia due to Mirela Arnold 647 Course:   Alberto Rolle is a 69 y o  male patient with a past medical history significant for dementia with behavioral disturbance residing at the [facility] care unit who originally presented to the hospital on 2022 due to an unwitnessed fall at the patient's memory care unit  Please refer to the initial history and physical as outlined by Jake Burnett on 2022 for additional presenting features  In brief, patient was found to be COVID positive with evidence of pneumonia on CT chest/abdomen/pelvis  Although patient also had episodes of hypotension requiring IV fluids  Ultimately patient was transitioned to level 4 comfort care, per the request of the patient's daughter and wife  Patient was evaluated by hospice liaison, and approved for hospice services  Patient's family initially wanted hospice at a skilled nursing facility; however, decided that they would prefer home with home hospice  Unfortunately, patient  prior to the completion of home hospice services set up  Patient  due to respiratory arrest on 07/15/2022 at 1:10 a m  Overnight provider was made aware by nursing, and confirmed the patient had   This is a brief discharge summary; please refer to the above assessment/plan as well as the remainder of the patient's medical record for further details  Please see above list of diagnoses and related plan for additional information       Condition at Discharge:     Discharge Day Visit / Exam:   * Please refer to separate progress note for these details *    Discussion with Family: Patient's family was called at the time of death        Discharge instructions/Information to patient and family:   See after visit summary for information provided to patient and family  Provisions for Follow-Up Care:  See after visit summary for information related to follow-up care and any pertinent home health orders  Disposition:   Other:     Planned Readmission:  None     Discharge Statement:  I spent 25 minutes discharging the patient  This time was spent on the day of discharge  I had direct contact with the patient on the day of discharge  Greater than 50% of the total time was spent examining patient, answering all patient questions, arranging and discussing plan of care with patient as well as directly providing post-discharge instructions  Additional time then spent on discharge activities  Discharge Medications:  See after visit summary for reconciled discharge medications provided to patient and/or family        **Please Note: This note may have been constructed using a voice recognition system**

## 2022-07-15 NOTE — QUICK NOTE
Had an extended conversation at patient's bedside with daughter as well as patient's wife  I apologized to her for confusion as to change in his ordered pain medications and benzos as per our conversation the other evening  I explained her that I thought I had made it clear that I understood goals of care and in the interest of that I changed his ordered pain medications and benzodiazepines from a p r n  status to scheduled and if he required any more pain control or sedation throughout the evening that the nurse would reach out to me and we would handle this on a case-by-case basis so that dosing as well as frequency could be customized in order to maintain his comfort  At that time no p r n  order was placed as it was unclear what his requirements may be and I wanted to evaluate this as the evening progressed  Patient required no supplemental pain control or sedation throughout the night therefore no further p r n  orders were placed as patient seemed to be adequately controlled with his current regime  Patient's daughter voiced that during day shift apparently he had some breakthrough agitation and she requested p r n  medications which had not been ordered as per hour discussion and understanding from the night before  Again apologized to patient's wife and daughter for the misunderstanding and assured them the both I and the patient's nurse continue to monitor him throughout the evening for signs or symptoms of pain or agitation and adequately medicated him as needed to address these issues if needed

## 2022-07-15 NOTE — ASSESSMENT & PLAN NOTE
· Previously resided at [address] [place] [address]  · Status post Ativan 2 mg mg q 4 hours for agitation per comfort order set  · Patient  on 07/15/2022 at [facility]

## 2022-07-15 NOTE — ASSESSMENT & PLAN NOTE
· Unwitnessed fall at memory care unit  · CT Brain (): Small right frontal scalp hematomas but no calvarial fracture or acute intracranial process is seen   · CT C-spine (): Degenerative changes with no evidence of acute cervical spine injury   · Status post Pain control with Dilaudid 0 5 mg q 4 hours and morphine 2 mg q 4 hrs prn breakthrough pain, per comfort order set  · Patient  on 07/15/2022 at [facility]

## 2022-07-15 NOTE — ASSESSMENT & PLAN NOTE
· CT abd/pelvis (): Moderate constipation and fecal impaction  Mild abnormal appearance of the rectum, suspicious for stercoral proctitis  Follow-up GI and/or surgical consultation recommended     · S/p Dulcolax suppository per comfort orders  · S/p Fleet enema as needed for constipation  · Patient  on 07/15/2022 at [facility]

## 2022-07-15 NOTE — ASSESSMENT & PLAN NOTE
· Hypotensive on arrival; family declined invasive measures such as central line and vasopressors  · CMO   · Patient  on 07/15/2022 at [facility]

## 2022-07-15 NOTE — NURSING NOTE
notified of covid positive  [facility] called  [facility]  home called and notified  Pt placed in body bag with all tags in place, no belongings

## 2022-07-15 NOTE — NURSING NOTE
Pt  110  Notified by PCA and alerted via [facility] pager that it was not reading  Hospitalist made aware, informed that provider would call pt's family

## 2024-04-03 NOTE — TELEPHONE ENCOUNTER
----- Message from Nathalie Lugo MD sent at 3/22/2018  3:37 PM EDT -----  Please remind me on Friday to et you order for speech therapy for him- also he needs appt nest week - speech therapist says he is very depressed
CALLED PT BUT HIS WIFE ANSWERED AND HE WASN'T WITH HER   SHE'S GOING TO CHECK WITH HIM AND HIS SCHEDULE AND THEN CALL BACK TOMORROW TO MAKE THE APPT
PATIENT WIFE CALLED STATING THAT PATIENT HAD AN ANXIETY ATTACK / MELTDOWN THIS MORNING  Attack went on for about 45 minutes or so , patient is scared to that he is going to have a heart attack  She gave him a low dose of alprazolam ( she's not sure which med , she states its hers) it seemed to help   She worried since the weekend is coming, she does not want to have to take him to the ER
SPOKE WITH BRENT, SHE STATED THAT THEY'LL BE HERE TODAY FOR 2PM
[facility]

## 2024-11-01 NOTE — DEATH NOTE
INPATIENT DEATH NOTE  Peter Marie 69 y o  male MRN: [id number]  Unit/Bed#: -01 Encounter: [id number]    Date, Time and Cause of Death    Date of Death: 7/15/22  Time of Death:  1:10 AM  Preliminary Cause of Death: Respiratory arrest  Entered by: Sapphire Babcock PA-C[ES1 1]     Attribution     ES1 1 Meri Robert PA-C 07/15/22 01:27           Patient's Information  Pronounced by: Darleen Staton RN  Did the patient's death occur in the ED?: No  Did the patient's death occur in the OR?: No  Did the patient's death occur less than 10 days post-op?: No  Did the patient's death occur within 24 hours of admission?: No  Was code status DNR at the time of death?: Yes    PHYSICAL EXAM:  Spontaneous respirations absent    Medical Examiner notification criteria:  NONE APPLICABLE   Medical Examiner's office notified?:  Yes   Medical Examiner accepted case?:  No  Name of Medical Examiner: Beatrice Zimmer    Family Notification  Was the family notified?: Yes  Date Notified: 07/15/22  Time Notified: 0123  Notified by: Sapphire Babcock PA-C  Name of Family Notified of Death: Che Brooks   Relationship to Patient: Spouse  Family Notification Route: Telephone  Was the family told to contact a  home?: No    Autopsy Options:  Post-mortem examination declined by next of kin    Primary Service Attending Physician notified?:  yes - Attending:  Sophia Morris MD    Physician/Resident responsible for completing Discharge Summary:  Corey Berry PA-C
Improved